# Patient Record
Sex: MALE | Race: WHITE | HISPANIC OR LATINO | Employment: OTHER | URBAN - METROPOLITAN AREA
[De-identification: names, ages, dates, MRNs, and addresses within clinical notes are randomized per-mention and may not be internally consistent; named-entity substitution may affect disease eponyms.]

---

## 2017-01-04 ENCOUNTER — OFFICE VISIT (OUTPATIENT)
Dept: BEHAVIORAL HEALTH | Facility: PHYSICIAN GROUP | Age: 42
End: 2017-01-04
Payer: OTHER GOVERNMENT

## 2017-01-04 DIAGNOSIS — F33.1 MAJOR DEPRESSIVE DISORDER, RECURRENT EPISODE, MODERATE (HCC): ICD-10-CM

## 2017-01-04 PROCEDURE — 99214 OFFICE O/P EST MOD 30 MIN: CPT | Performed by: NURSE PRACTITIONER

## 2017-01-04 RX ORDER — BUPROPION HYDROCHLORIDE 150 MG/1
150 TABLET ORAL EVERY MORNING
Qty: 90 TAB | Refills: 0 | Status: SHIPPED | OUTPATIENT
Start: 2017-01-04 | End: 2017-04-05 | Stop reason: SDUPTHER

## 2017-01-04 RX ORDER — SILDENAFIL CITRATE 100 MG
TABLET ORAL
Refills: 2 | COMMUNITY
Start: 2016-11-25 | End: 2018-06-07 | Stop reason: SDUPTHER

## 2017-01-04 RX ORDER — INFLUENZA VIRUS VACCINE 15; 15; 15; 15 UG/.5ML; UG/.5ML; UG/.5ML; UG/.5ML
SUSPENSION INTRAMUSCULAR
Refills: 0 | COMMUNITY
Start: 2016-10-11 | End: 2018-04-24

## 2017-01-04 RX ORDER — BUPROPION HYDROCHLORIDE 300 MG/1
300 TABLET ORAL EVERY MORNING
COMMUNITY
End: 2017-04-05 | Stop reason: SDUPTHER

## 2017-01-04 RX ORDER — ERGOCALCIFEROL 1.25 MG/1
50000 CAPSULE ORAL
Refills: 11 | COMMUNITY
Start: 2016-10-27 | End: 2019-03-18

## 2017-01-04 NOTE — PROGRESS NOTES
[facility]  PSYCHIATRIC FOLLOW-UP NOTE    Name: Mike Riso  MRN: [id number]  : 1975  Age: 41 y.o.  Date of assessment: 2017  PCP: Vinay Soares D.O.  Persons in attendance: Patient    REASON FOR VISIT/CHIEF COMPLAINT (as stated by Patient):  Mike Rios is a 41 y.o., Other  White male, attending follow-up appointment for depression and insomnia, medication recheck      HISTORY OF PRESENT ILLNESS:    Patient reports he has restarted taking Wellbutrin  mg daily, that his mom passed away in August and he was not taking medication and started struggling again with depression. Reports he was having problems sleeping, was irritable, was having problems with concentration and memory without medication. Verbalizes he is improving, is doing okay at work, has been taking meds every day. Has kept the prescription for Wellbutrin  mg in case he finds his mood going lower and he feels he needs to increase his dose. Patient has also noticed he was having nightmares while not taking his antidepressant.  Patient takes trazodone for sleep at night when needed. Patient denies suicidal thoughts, but reports he did have some suicidal thinking after his mom passed away in August.     PSYCHOSOCIAL CHANGES SINCE PREVIOUS CONTACT:  No drinking or drugs    MEDICATION SIDE EFFECTS:  none    REVIEW OF SYSTEMS:    General appearance: casually dressed male, dressed in uniform. Neat/clean, pleasant and cooperative    Constitutional negative -no fever/chills   Eyes not tested   Ears/Nose/Mouth/Throat not tested   Cardiovascular not tested   Respiratory positive - sleep apnea, using CPAP again   Gastrointestinal negative - appetite within normal limits   Genitourinary positive - taking Viagra   Muscular not tested   Integumentary not tested   Neurological negative   Endocrine not tested   Hematologic/Lymphatic not tested       PSYCHIATRIC EXAMINATION/MENTAL STATUS  There were no vitals taken for  this visit.  Participation: Active verbal participation, Attentive, Engaged and Open to feedback  Grooming:Casual and Neat  Orientation: Fully Oriented  Eye contact: Good  Behavior:Calm   Mood: Euthymic  Affect: Full range and Congruent with content  Thought process: Logical and Goal-directed  Thought content:  Within normal limits  Speech: Rate within normal limits and Volume within normal limits  Perception:  Within normal limits  Memory:  No gross evidence of memory deficits  Insight: Good  Judgment: Good  Family/couple interaction observations:   Other:    Current risk:    Suicide: Low   Homicide: Not applicable   Self-harm: Low  Relapse: Not applicable  Other:   Crisis Safety Plan reviewed?911/ER for suicidal thoughts  If evidence of imminent risk is present, intervention/plan:    Medical Records/Labs/Diagnostic Tests Reviewed: none    Medical Records/Labs/Diagnostic Tests Ordered: none    DIAGNOSTIC IMPRESSION(S):  1. Major depressive disorder, recurrent episode, moderate (HCC)           ASSESSMENT AND PLAN: recurrent episode of MDD, mood is improving; appears to have started with grief reaction to mom's death and being off of medication  Patient requests a letter about diagnosis and symptoms, need for medication; staff will inform him when it is ready for .   Discussed monitoring his mood, if he feels he is having symptoms of depression he needs to be taking the Wellbutrin  mg daily instead of the 150 mg daily dose he is currently taking. Discussed with patient need to stay on medication that is working, instead of going on and off of it as he has had repeat episodes of MDD.   Continue trazodone prn.      Current outpatient prescriptions:   •  vitamin D, Ergocalciferol, (DRISDOL) 48458 UNITS Cap capsule, Take 50,000 Units by mouth., Disp: , Rfl: 11  •  VIAGRA 100 MG tablet, TAKE 1 TABLET BY MOUTH 30MIN BEFORE ACTIVITY, Disp: , Rfl: 2  •  buPROPion (WELLBUTRIN XL) 300 MG XL tablet, Take 300 mg by  mouth every morning., Disp: , Rfl:   •  buPROPion (WELLBUTRIN XL) 150 MG XL tablet, Take 1 Tab by mouth every morning., Disp: 30 Tab, Rfl: 3  •  azelastine (ASTELIN) 137 MCG/SPRAY nasal spray, Spray 2 Sprays in nose 2 Times a Day., Disp: , Rfl: 4  •  DYMISTA 137-50 MCG/ACT Suspension, Spray 2 Sprays in nose every day., Disp: , Rfl: 5  •  omeprazole (PRILOSEC) 20 MG delayed-release capsule, Take 20 mg by mouth every day., Disp: , Rfl:   •  hydrocodone-acetaminophen (NORCO) 5-325 MG Tab per tablet, Take 1 Tab by mouth every 6 hours as needed., Disp: 20 Tab, Rfl: 0  •  diclofenac EC (VOLTAREN) 75 MG Tablet Delayed Response, Take 1 Tab by mouth 2 times a day., Disp: 60 Tab, Rfl: 0  •  carisoprodol (SOMA) 350 MG TABS, Take 1 Tab by mouth every 8 hours as needed for Muscle Spasms., Disp: 30 Tab, Rfl: 0  •  FLUARIX QUADRIVALENT 0.5 ML Suspension Prefilled Syringe injection, TO BE ADMINISTERED BY PHARMACIST FOR IMMUNIZATION, Disp: , Rfl: 0  •  ibuprofen (MOTRIN) 200 MG Tab, Take 200 mg by mouth every 6 hours as needed., Disp: , Rfl:   •  Diclofenac Epolamine (FLECTOR) 1.3 % PTCH, 0.5 Patches by Apply externally route every 12 hours as needed., Disp: 5 Each, Rfl: 3        More than 50% of face-to-face time in this 25 minute visit was spent in psychotherapy/counseling.    Topics addressed include:    CAROLE Catherine

## 2017-01-05 ENCOUNTER — OFFICE VISIT (OUTPATIENT)
Dept: BEHAVIORAL HEALTH | Facility: PHYSICIAN GROUP | Age: 42
End: 2017-01-05
Payer: OTHER GOVERNMENT

## 2017-01-05 DIAGNOSIS — F33.1 MAJOR DEPRESSIVE DISORDER, RECURRENT EPISODE, MODERATE (HCC): ICD-10-CM

## 2017-01-05 PROCEDURE — 90832 PSYTX W PT 30 MINUTES: CPT | Performed by: PSYCHOLOGIST

## 2017-01-05 NOTE — BH THERAPY
" [facility]  Therapy Progress Note    Patient Name: Mike Rios  Patient MRN: [id number]  Today's Date: 1/5/17     Type of session:Individual psychotherapy  Length of session: 45  Persons in attendance:Patient    Subjective/New Info: Pt reports he is back to taking meds and using c-pap machine regularly now and feels much better. He finishes his school in May and is now thinking of getting his PhD in . Pt less irritated by work, but still hates job and the politics that go on there. He struggles with his ability to detach from \"the circus\" and just focus on his own efforts. Some of this is because he takes slights from others too personally. Pt reports less distress from his mom's death - beginning to accept it. 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 Disc need to introduce some fun and relaxation into his life.     Objective/Observations:   Participation: Active verbal participation, Attentive, Engaged and Open to feedback   Grooming: Good   Cognition: Alert and Fully Oriented   Eye contact: Good   Mood: Euthymic   Affect: Congruent with content   Thought process: Logical and Goal-directed   Speech: Rate within normal limits   Other:     Diagnoses:   1. Major depressive disorder, recurrent episode, moderate (HCC)         Current risk:   SUICIDE: Low   Homicide: Not applicable   Self-harm: Not applicable   Relapse: Not applicable   Other:    Safety Plan reviewed? Not Indicated   If evidence of imminent risk is present, intervention/plan:     Therapeutic Intervention(s): Behavior:  Behavioral activation, Conflict clarification, Distress tolerance skills, Self-care skills, Stressors assessed and Supportive psychotherapy    Treatment Goal(s)/Objective(s) addressed: Tx plan:  - Utilize learned skills to manage mood and emotional suffering more effectively  - Identify goals/values and cultivate a " meaningful life  - Learn to be more emotionally flexible/resilient in times of stress/challenges  - Eliminate SI/self harming behaviors & increase sense of hope/optimisim        Progress toward Treatment Goals: Moderate improvement    Plan:  - Continue Individual therapy    CESAR AlcocerHJEFFREY.  1/5/17

## 2017-02-24 ENCOUNTER — TELEPHONE (OUTPATIENT)
Dept: BEHAVIORAL HEALTH | Facility: PHYSICIAN GROUP | Age: 42
End: 2017-02-24

## 2017-02-24 NOTE — TELEPHONE ENCOUNTER
Attempt to contact patient regarding letter for work, left message for patient to return phone call and verify what he is needing exactly in the letter.

## 2017-02-27 ENCOUNTER — TELEPHONE (OUTPATIENT)
Dept: BEHAVIORAL HEALTH | Facility: PHYSICIAN GROUP | Age: 42
End: 2017-02-27

## 2017-02-27 NOTE — TELEPHONE ENCOUNTER
Contact patient regarding letter he needs about his treatment/diagnoses. Patient states he needs a letter with length of time in provider care, diagnoses, medication treatment, as well as symptoms including suicidal thoughts, problems sleeping, cognitive symptoms including poor concentration and motivation. Staff will phone patient when letter ready for .

## 2017-03-02 ENCOUNTER — OFFICE VISIT (OUTPATIENT)
Dept: BEHAVIORAL HEALTH | Facility: PHYSICIAN GROUP | Age: 42
End: 2017-03-02
Payer: OTHER GOVERNMENT

## 2017-03-02 DIAGNOSIS — F33.1 MAJOR DEPRESSIVE DISORDER, RECURRENT EPISODE, MODERATE (HCC): ICD-10-CM

## 2017-03-02 PROCEDURE — 90834 PSYTX W PT 45 MINUTES: CPT | Performed by: PSYCHOLOGIST

## 2017-03-02 NOTE — BH THERAPY
[facility]  Therapy Progress Note    Patient Name: Mike Rios  Patient MRN: [id number]  Today's Date: 3/2/2017     Type of session:Individual psychotherapy  Length of session: 45  Persons in attendance:Patient    Subjective/New Info: pt reports he feels more like himself on the meds and isn't over reacting to everything at work. Pt processed his feelings about being transferred June of next year to finish out his last year and half. Pt was irritated that his request to extend was denied, even when he explained that his father in law, who is now living with him, is having serious heart problems and will need to have bypass surgery at [place] in a couple of months. Pt agrees that it is better for him to stay on meds while he is working in . Pt is back to caring for himself with c-pap and exercise.    Objective/Observations:   Participation: Active verbal participation, Attentive, Engaged and Open to feedback   Grooming: Good   Cognition: Alert and Fully Oriented   Eye contact: Good   Mood: Euthymic   Affect: Congruent with content   Thought process: Logical and Goal-directed   Speech: Rate within normal limits   Other:     Diagnoses:   1. Major depressive disorder, recurrent episode, moderate (CMS-HCC)         Current risk:   SUICIDE: Low   Homicide: Not applicable   Self-harm: Not applicable   Relapse: Not applicable   Other:    Safety Plan reviewed? Not Indicated   If evidence of imminent risk is present, intervention/plan:     Therapeutic Intervention(s): Behavior:  Behavioral activation, Conflict clarification, Distress tolerance skills, Self-care skills, Stressors assessed and Supportive psychotherapy    Treatment Goal(s)/Objective(s) addressed: Tx plan:  - Utilize learned skills to manage mood and emotional suffering more effectively  - Identify goals/values and cultivate a meaningful life  - Learn to be more emotionally flexible/resilient in times of stress/challenges  - Eliminate  SI/self harming behaviors & increase sense of hope/optimisim        Progress toward Treatment Goals: Moderate improvement    Plan:  - Continue Individual therapy and Medication management    CESAR AlcocerHANGY  3/2/2017

## 2017-03-08 ENCOUNTER — SLEEP CENTER VISIT (OUTPATIENT)
Dept: SLEEP MEDICINE | Facility: MEDICAL CENTER | Age: 42
End: 2017-03-08
Payer: OTHER GOVERNMENT

## 2017-03-08 VITALS
WEIGHT: 203 LBS | BODY MASS INDEX: 27.5 KG/M2 | SYSTOLIC BLOOD PRESSURE: 112 MMHG | HEIGHT: 72 IN | DIASTOLIC BLOOD PRESSURE: 68 MMHG | HEART RATE: 75 BPM | RESPIRATION RATE: 12 BRPM

## 2017-03-08 DIAGNOSIS — G47.33 OBSTRUCTIVE SLEEP APNEA: ICD-10-CM

## 2017-03-08 DIAGNOSIS — K21.9 GASTROESOPHAGEAL REFLUX DISEASE, ESOPHAGITIS PRESENCE NOT SPECIFIED: ICD-10-CM

## 2017-03-08 PROCEDURE — 99213 OFFICE O/P EST LOW 20 MIN: CPT | Performed by: NURSE PRACTITIONER

## 2017-03-08 NOTE — MR AVS SNAPSHOT
"        Mike Rios   3/8/2017 8:40 AM   Sleep Center Visit   MRN: [id number]    Department:  [facility]   Dept Phone:  [phone]    Description:  Male : 1975   Provider:  CAROLE Orellana           Reason for Visit     Follow-Up           Allergies as of 3/8/2017     Allergen Noted Reactions    Pcn [Penicillins] 2009         You were diagnosed with     Obstructive sleep apnea   [592196]       Gastroesophageal reflux disease, esophagitis presence not specified   [5448802]         Vital Signs     Blood Pressure Pulse Respirations Height Weight Body Mass Index    112/68 mmHg 75 12 1.829 m (6' 0.01\") 92.08 kg (203 lb) 27.53 kg/m2    Smoking Status                   Never Smoker            Basic Information     Date Of Birth Sex Race Ethnicity Preferred Language    1975 Male Other, White  Origin (Azeri,Indonesian,Portuguese,He, etc) English      Your appointments     Mar 08, 2017  8:40 AM   Follow UP with CAROLE Orellana   [facility] (--)    [address] [place] [id number]   [phone]            2017  9:30 AM   Follow Up Med Management with CAROLE Catherine   [facility] [address] [place] [address])    [address] [place] [address]   [phone]            May 11, 2017  9:00 AM   Individual Therapy with June Canela P.H.D.   [facility] [address] [place] [address])    [address] [place], [place] [address] [place] [address]   [phone]            2017  9:00 AM   Individual Therapy with June Canela P.H.D.   [facility] [address] [place] [address])    [address] [place], [place] [address] [place] [address]   [phone]            2017  9:00 AM   Follow Up Med Management with CAROLE Catherine   [facility] [address] [place] [address])    [address] [place], [place] [address] [place] [address]   [phone]            Mar 19, 2018  8:40 AM   Follow UP with CAROLE Orellana "   [facility] Sleep [facility] (--)    [address]  [place] [id number] [phone]              Problem List              ICD-10-CM Priority Class Noted - Resolved    Major depressive disorder, recurrent episode, moderate (CMS-HCC) F33.1   11/10/2016 - Present    Grief reaction F43.20   11/10/2016 - Present    Sleep apnea G47.30   11/10/2016 - Present    Obstructive sleep apnea G47.33   3/8/2017 - Present    Gastroesophageal reflux disease K21.9   3/8/2017 - Present      Health Maintenance        Date Due Completion Dates    IMM DTaP/Tdap/Td Vaccine (1 - Tdap) 3/13/1993 ---    IMM INFLUENZA (1) 9/1/2016 ---            Current Immunizations     No immunizations on file.      Below and/or attached are the medications your provider expects you to take. Review all of your home medications and newly ordered medications with your provider and/or pharmacist. Follow medication instructions as directed by your provider and/or pharmacist. Please keep your medication list with you and share with your provider. Update the information when medications are discontinued, doses are changed, or new medications (including over-the-counter products) are added; and carry medication information at all times in the event of emergency situations     Allergies:  PCN - (reactions not documented)               Medications  Valid as of: March 08, 2017 -  8:22 AM    Generic Name Brand Name Tablet Size Instructions for use    Azelastine HCl (Solution) ASTELIN 137 MCG/SPRAY Spray 2 Sprays in nose 2 Times a Day.        Azelastine-Fluticasone (Suspension) DYMISTA 137-50 MCG/ACT Spray 2 Sprays in nose every day.        BuPROPion HCl (TABLET SR 24 HR) WELLBUTRIN  MG Take 300 mg by mouth every morning.        BuPROPion HCl (TABLET SR 24 HR) WELLBUTRIN  MG Take 1 Tab by mouth every morning.        Carisoprodol (Tab) SOMA 350 MG Take 1 Tab by mouth every 8 hours as needed for Muscle Spasms.        Diclofenac  Epolamine (Patch) FLECTOR 1.3 % 0.5 Patches by Apply externally route every 12 hours as needed.        Diclofenac Sodium (Tablet Delayed Response) VOLTAREN 75 MG Take 1 Tab by mouth 2 times a day.        Ergocalciferol (Cap) DRISDOL 98434 UNITS Take 50,000 Units by mouth.        Hydrocodone-Acetaminophen (Tab) NORCO 5-325 MG Take 1 Tab by mouth every 6 hours as needed.        Ibuprofen (Tab) MOTRIN 200 MG Take 200 mg by mouth every 6 hours as needed.        Influenza Vac Split Quad (Suspension Prefilled Syringe) FLUARIX QUADRIVALENT 0.5 ML TO BE ADMINISTERED BY PHARMACIST FOR IMMUNIZATION        Omeprazole (CAPSULE DELAYED RELEASE) PRILOSEC 20 MG Take 20 mg by mouth every day.        Sildenafil Citrate (Tab) VIAGRA 100 MG TAKE 1 TABLET BY MOUTH 30MIN BEFORE ACTIVITY        .                 Medicines prescribed today were sent to:     [id number] - [place] - [address]    [address] [place] [address]    Phone: [phone] Fax: [phone]    Open 24 Hours?: No    [facility] [address] [place] [address] [place] - [address] AT [facility] [employer] [facility]    [address] [place] [address]    Phone: [phone] Fax: [phone]    Open 24 Hours?: No      Medication refill instructions:       If your prescription bottle indicates you have medication refills left, it is not necessary to call your provider’s office. Please contact your pharmacy and they will refill your medication.    If your prescription bottle indicates you do not have any refills left, you may request refills at any time through one of the following ways: The online [employer] system (except Urgent Care), by calling your provider’s office, or by asking your pharmacy to contact your provider’s office with a refill request. Medication refills are processed only during regular business hours and may not be available until the next business day. Your provider may request additional information or to have a follow-up visit with you prior to  refilling your medication.   *Please Note: Medication refills are assigned a new Rx number when refilled electronically. Your pharmacy may indicate that no refills were authorized even though a new prescription for the same medication is available at the pharmacy. Please request the medicine by name with the pharmacy before contacting your provider for a refill.        Instructions    1. Patient will fax form to be filled out.  2. Follow up in 1 year with compliance card, sooner if needed.          [employer] Access Code: Activation code not generated  Current Keelr Status: Active

## 2017-03-08 NOTE — PROGRESS NOTES
Chief Complaint   Patient presents with   • Follow-Up       HPI:  Mike Rios is a 41 y.o. year old male here today for annual follow-up on FLIP f/u. Original sleep symptoms consisted of loud snoring, witnessed apneas,  Frequent nocturnal awakenings, non-restful sleep, and morning headaches.  Titration study indicated AHI of 19.9 with an air 84%.  He did have several central apneas.  His father did die of a heart attack and was also untreated for sleep apnea. He has successfully acclimated with nasal mask to CPAP 10 cm H2O.  Compliance card 12/8/16-3/7/17 indicates 73% compliance, avg nightly use of 4.5hrs, AHi 0.4 and minimal mask leak. He has had less consistent use due to depression from his mother passing away. He understands the need to use nightly. He does note a difference in how he feels when he does not use machine.  CNOX 3/15/2016 indicates basal SPO2 of 93.4% and below 90% for 6.7% of testing but there is some artifact but overall patient was adequate oximetry.  He states he feels much better since starting therapy with improved energy levels, no daytime sleepiness, no headaches, and easily to wake in the morning.  He denies nasal congestion or dry mouth. He denies any changes in health since last OV. He denies fever, chills, night sweats, chest pain, dyspnea, cough, wheezing, ankle swelling or hemoptysis. GERD is controlled with Prilosec QD.    ROS: As per HPI and otherwise negative if not stated.    Past Medical History   Diagnosis Date   • Back pain    • FLIP (obstructive sleep apnea)    • Central sleep apnea    • Depression        Past Surgical History   Procedure Laterality Date   • Hernia repair         Family History   Problem Relation Age of Onset   • Heart Attack     • Hypertension Father    • Heart Disease Father    • Hyperlipidemia Father    • Psychiatry Neg Hx        Social History     Social History   • Marital Status:      Spouse Name: N/A   • Number of Children: N/A   • Years  "of Education: N/A     Occupational History   • Not on file.     Social History Main Topics   • Smoking status: Never Smoker    • Smokeless tobacco: Not on file   • Alcohol Use: 0.0 oz/week     0 Standard drinks or equivalent per week      Comment: RARE   • Drug Use: No      Comment: denies   • Sexual Activity:     Partners: Female     Other Topics Concern   • Not on file     Social History Narrative       Allergies as of 03/08/2017 - Primo as Reviewed 03/08/2017   Allergen Reaction Noted   • Pcn [penicillins]  08/04/2009        @Vital signs for this encounter:  Filed Vitals:    03/08/17 0803   Height: 1.829 m (6' 0.01\")   Weight: 92.08 kg (203 lb)   Weight % change since last entry.: 0 %   BP: 112/68   Pulse: 75   BMI (Calculated): 27.53   Resp: 12   O2 sat % room air: 98 %       Current medications as of today   Current Outpatient Prescriptions   Medication Sig Dispense Refill   • vitamin D, Ergocalciferol, (DRISDOL) 47123 UNITS Cap capsule Take 50,000 Units by mouth.  11   • FLUARIX QUADRIVALENT 0.5 ML Suspension Prefilled Syringe injection TO BE ADMINISTERED BY PHARMACIST FOR IMMUNIZATION  0   • VIAGRA 100 MG tablet TAKE 1 TABLET BY MOUTH 30MIN BEFORE ACTIVITY  2   • buPROPion (WELLBUTRIN XL) 300 MG XL tablet Take 300 mg by mouth every morning.     • buPROPion (WELLBUTRIN XL) 150 MG XL tablet Take 1 Tab by mouth every morning. 90 Tab 0   • azelastine (ASTELIN) 137 MCG/SPRAY nasal spray [place] 2 Sprays in nose 2 Times a Day.  4   • DYMISTA 137-50 MCG/ACT Suspension Spray 2 Sprays in nose every day.  5   • ibuprofen (MOTRIN) 200 MG Tab Take 200 mg by mouth every 6 hours as needed.     • omeprazole (PRILOSEC) 20 MG delayed-release capsule Take 20 mg by mouth every day.     • hydrocodone-acetaminophen (NORCO) 5-325 MG Tab per tablet Take 1 Tab by mouth every 6 hours as needed. 20 Tab 0   • diclofenac EC (VOLTAREN) 75 MG Tablet Delayed Response Take 1 Tab by mouth 2 times a day. 60 Tab 0   • carisoprodol (SOMA) 350 MG " TABS Take 1 Tab by mouth every 8 hours as needed for Muscle Spasms. 30 Tab 0   • Diclofenac Epolamine (FLECTOR) 1.3 % PTCH 0.5 Patches by Apply externally route every 12 hours as needed. 5 Each 3     No current facility-administered medications for this visit.         Physical Exam:   Gen:           Alert and oriented, No apparent distress. Mood and affect appropriate, normal interaction with examiner.  Eyes:          PERRL, EOM intact, sclere white, conjunctive moist.  Ears:          Not examined.  Hearing:     Grossly intact.  Nose:          Normal, no lesions or deformities.  Dentition:    Good dentition.  Oropharynx:   Tongue normal, posterior pharynx without erythema or exudate.  Mallampati Classification: 3  Neck:        Supple, trachea midline, no masses.  Respiratory Effort: No intercostal retractions or use of accessory muscles.   Lung Auscultation:      Clear to auscultation bilaterally; no rales, rhonchi or wheezing.  CV:            Regular rate and rhythm. No murmurs, rubs or gallops.  Abd:           Not examined.   Lymphadenopathy: Not examined.  Gait and Station: Normal.  Digits and Nails: No clubbing, cyanosis, petechiae, or nodes.   Cranial Nerves: II-XII grossly intact.  Skin:        No rashes, lesions or ulcers noted.               Ext:           No cyanosis or edema.      Assessment:  1. Obstructive sleep apnea     2. Gastroesophageal reflux disease, esophagitis presence not specified         Immunizations:    Flu:2016  Pneumovax 23:not given  Prevnar 13:not given    Plan:  1. Continue CPAP 10cm H20 nightly.  2. DME order mask/supplies.  3. Discussed sleep hygiene.  4. Follow up in 1 year with compliance card, sooner if needed. Patient will fax form to sleep center to be completed for work.

## 2017-03-08 NOTE — PATIENT INSTRUCTIONS
1. Patient will fax form to be filled out.  2. Follow up in 1 year with compliance card, sooner if needed.

## 2017-03-22 ENCOUNTER — OCCUPATIONAL MEDICINE (OUTPATIENT)
Dept: URGENT CARE | Facility: PHYSICIAN GROUP | Age: 42
End: 2017-03-22
Payer: OTHER GOVERNMENT

## 2017-03-22 VITALS
TEMPERATURE: 97.7 F | HEART RATE: 64 BPM | HEIGHT: 72 IN | OXYGEN SATURATION: 96 % | DIASTOLIC BLOOD PRESSURE: 74 MMHG | SYSTOLIC BLOOD PRESSURE: 110 MMHG | RESPIRATION RATE: 16 BRPM | BODY MASS INDEX: 27.09 KG/M2 | WEIGHT: 200 LBS

## 2017-03-22 DIAGNOSIS — M62.838 TRAPEZIUS MUSCLE SPASM: ICD-10-CM

## 2017-03-22 PROCEDURE — 99214 OFFICE O/P EST MOD 30 MIN: CPT | Performed by: PHYSICIAN ASSISTANT

## 2017-03-22 RX ORDER — SUMATRIPTAN 50 MG/1
TABLET, FILM COATED ORAL
COMMUNITY
Start: 2017-03-19 | End: 2018-04-24

## 2017-03-22 RX ORDER — CYCLOBENZAPRINE HCL 10 MG
10 TABLET ORAL 3 TIMES DAILY PRN
Qty: 30 TAB | Refills: 0 | Status: SHIPPED | OUTPATIENT
Start: 2017-03-22 | End: 2019-03-22

## 2017-03-22 RX ORDER — CYCLOBENZAPRINE HCL 10 MG
10 TABLET ORAL 3 TIMES DAILY PRN
Qty: 30 TAB | Refills: 0 | Status: SHIPPED | OUTPATIENT
Start: 2017-03-22 | End: 2017-03-22 | Stop reason: SDUPTHER

## 2017-03-22 ASSESSMENT — ENCOUNTER SYMPTOMS
FALLS: 0
NAUSEA: 0
WHEEZING: 0
BACK PAIN: 0
TINGLING: 1
PALPITATIONS: 0
COUGH: 0
NECK PAIN: 1
DIARRHEA: 0
SHORTNESS OF BREATH: 0
CHILLS: 0
ABDOMINAL PAIN: 0
FEVER: 0
VOMITING: 0
SENSORY CHANGE: 0
MUSCLE SPASMS: 1

## 2017-03-22 ASSESSMENT — PAIN SCALES - GENERAL: PAINLEVEL: 5=MODERATE PAIN
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
## 2017-03-22 NOTE — MR AVS SNAPSHOT
Mike Rios   3/22/2017 2:20 PM   Occupational Medicine   MRN: [id number]    Department:  [facility]   Dept Phone:  [phone]    Description:  Male : 1975   Provider:  Oscar Moon PA-C           Reason for Visit     Shoulder Injury WC IV 17 Lt Shoulder/Neck Injury. Pt states he has a pinching sensation in Lt shoulder/neck after doing pushups for a PFT. Pain when looking Lt/up/down.      Allergies as of 3/22/2017     Allergen Noted Reactions    Pcn [Penicillins] 2009         You were diagnosed with     Trapezius muscle spasm   [[id number]]         Vital Signs     Blood Pressure Pulse Temperature Respirations Height Weight    110/74 mmHg 64 36.5 °C (97.7 °F) 16 1.829 m (6') 90.719 kg (200 lb)    Body Mass Index Oxygen Saturation Smoking Status             27.12 kg/m2 96% Never Smoker          Basic Information     Date Of Birth Sex Race Ethnicity Preferred Language    1975 Male Other, White  Origin ([place] citizen,[place], etc) English      Your appointments     2017  9:30 AM   Follow Up Med Management with CAROLE Catherine   [facility] [address] [place] [address])    [address] [place] [address]   [phone]            May 11, 2017  9:00 AM   Individual Therapy with June Canela P.H.D.   [facility] [address] [place] [address])    [address] [place] [address]   [phone]            2017  9:00 AM   Individual Therapy with June Canela P.H.D.   [facility] [address] [place] [address])    [address] [place] [address]   [phone]            2017  9:00 AM   Follow Up Med Management with CAROLE Catherine   [facility] [address] [place] [address])    [address] [place] [address]   [phone]            Mar 19, 2018  8:40 AM   Follow UP with CAROLE Orellana   [facility] (--)    [address] [place] [address]  [place] [address]   [phone]              Problem List              ICD-10-CM Priority Class Noted - Resolved    Major depressive disorder, recurrent episode, moderate (CMS-HCC) F33.1   11/10/2016 - Present    Grief reaction F43.20   11/10/2016 - Present    Sleep apnea G47.30   11/10/2016 - Present    Obstructive sleep apnea G47.33   3/8/2017 - Present    Gastroesophageal reflux disease K21.9   3/8/2017 - Present      Health Maintenance        Date Due Completion Dates    IMM DTaP/Tdap/Td Vaccine (1 - Tdap) 3/13/1993 ---    IMM INFLUENZA (1) 9/1/2016 ---            Current Immunizations     No immunizations on file.      Below and/or attached are the medications your provider expects you to take. Review all of your home medications and newly ordered medications with your provider and/or pharmacist. Follow medication instructions as directed by your provider and/or pharmacist. Please keep your medication list with you and share with your provider. Update the information when medications are discontinued, doses are changed, or new medications (including over-the-counter products) are added; and carry medication information at all times in the event of emergency situations     Allergies:  PCN - (reactions not documented)               Medications  Valid as of: March 22, 2017 -  3:14 PM    Generic Name Brand Name Tablet Size Instructions for use    Azelastine HCl (Solution) ASTELIN 137 MCG/SPRAY Spray 2 Sprays in nose 2 Times a Day.        Azelastine-Fluticasone (Suspension) DYMISTA 137-50 MCG/ACT Spray 2 Sprays in nose every day.        BuPROPion HCl (TABLET SR 24 HR) WELLBUTRIN  MG Take 300 mg by mouth every morning.        BuPROPion HCl (TABLET SR 24 HR) WELLBUTRIN  MG Take 1 Tab by mouth every morning.        Carisoprodol (Tab) SOMA 350 MG Take 1 Tab by mouth every 8 hours as needed for Muscle Spasms.        Cyclobenzaprine HCl (Tab) FLEXERIL 10 MG Take 1 Tab by mouth 3 times a day as needed.         Diclofenac Epolamine (Patch) FLECTOR 1.3 % 0.5 Patches by Apply externally route every 12 hours as needed.        Diclofenac Sodium (Tablet Delayed Response) VOLTAREN 75 MG Take 1 Tab by mouth 2 times a day.        Ergocalciferol (Cap) DRISDOL 83150 UNITS Take 50,000 Units by mouth.        Hydrocodone-Acetaminophen (Tab) NORCO 5-325 MG Take 1 Tab by mouth every 6 hours as needed.        Ibuprofen (Tab) MOTRIN 200 MG Take 200 mg by mouth every 6 hours as needed.        Influenza Vac Split Quad (Suspension Prefilled Syringe) FLUARIX QUADRIVALENT 0.5 ML TO BE ADMINISTERED BY PHARMACIST FOR IMMUNIZATION        Omeprazole (CAPSULE DELAYED RELEASE) PRILOSEC 20 MG Take 20 mg by mouth every day.        Sildenafil Citrate (Tab) VIAGRA 100 MG TAKE 1 TABLET BY MOUTH 30MIN BEFORE ACTIVITY        SUMAtriptan Succinate (Tab) IMITREX 50 MG         .                 Medicines prescribed today were sent to:     [id number] - [place] - [address]    [address] [place] [address]    Phone: [phone] Fax: [phone]    Open 24 Hours?: No    [id number]  [place] - [address] AT [facility] [address]    [address] [place] [address]    Phone: [phone] Fax: [phone]    Open 24 Hours?: No      Medication refill instructions:       If your prescription bottle indicates you have medication refills left, it is not necessary to call your provider’s office. Please contact your pharmacy and they will refill your medication.    If your prescription bottle indicates you do not have any refills left, you may request refills at any time through one of the following ways: The online [employer] system (except Urgent Care), by calling your provider’s office, or by asking your pharmacy to contact your provider’s office with a refill request. Medication refills are processed only during regular business hours and may not be available until the next business day. Your provider may request additional  information or to have a follow-up visit with you prior to refilling your medication.   *Please Note: Medication refills are assigned a new Rx number when refilled electronically. Your pharmacy may indicate that no refills were authorized even though a new prescription for the same medication is available at the pharmacy. Please request the medicine by name with the pharmacy before contacting your provider for a refill.        Referral     A referral request has been sent to our patient care coordination department. Please allow 3-5 business days for us to process this request and contact you either by phone or mail. If you do not hear from us by the 5th business day, please call us at [phone].           Aptible Access Code: Activation code not generated  Current Aptible Status: Active

## 2017-04-05 ENCOUNTER — OFFICE VISIT (OUTPATIENT)
Dept: BEHAVIORAL HEALTH | Facility: PHYSICIAN GROUP | Age: 42
End: 2017-04-05
Payer: OTHER GOVERNMENT

## 2017-04-05 VITALS
DIASTOLIC BLOOD PRESSURE: 84 MMHG | HEIGHT: 72 IN | HEART RATE: 70 BPM | WEIGHT: 205 LBS | SYSTOLIC BLOOD PRESSURE: 126 MMHG | BODY MASS INDEX: 27.77 KG/M2

## 2017-04-05 DIAGNOSIS — F33.1 MAJOR DEPRESSIVE DISORDER, RECURRENT EPISODE, MODERATE (HCC): ICD-10-CM

## 2017-04-05 PROCEDURE — 99213 OFFICE O/P EST LOW 20 MIN: CPT | Performed by: NURSE PRACTITIONER

## 2017-04-05 RX ORDER — BUPROPION HYDROCHLORIDE 300 MG/1
300 TABLET ORAL EVERY MORNING
Qty: 90 TAB | Refills: 0 | Status: SHIPPED | OUTPATIENT
Start: 2017-04-05 | End: 2017-07-20 | Stop reason: SDUPTHER

## 2017-04-05 RX ORDER — BUPROPION HYDROCHLORIDE 150 MG/1
150 TABLET ORAL EVERY MORNING
Qty: 90 TAB | Refills: 0 | Status: SHIPPED | OUTPATIENT
Start: 2017-04-05 | End: 2017-07-18

## 2017-04-05 RX ORDER — TRAZODONE HYDROCHLORIDE 50 MG/1
25-50 TABLET ORAL NIGHTLY PRN
Qty: 90 TAB | Refills: 0 | Status: SHIPPED | OUTPATIENT
Start: 2017-04-05 | End: 2017-07-13 | Stop reason: SDUPTHER

## 2017-05-11 ENCOUNTER — OFFICE VISIT (OUTPATIENT)
Dept: BEHAVIORAL HEALTH | Facility: PHYSICIAN GROUP | Age: 42
End: 2017-05-11
Payer: OTHER GOVERNMENT

## 2017-05-11 DIAGNOSIS — F33.1 MAJOR DEPRESSIVE DISORDER, RECURRENT EPISODE, MODERATE (HCC): ICD-10-CM

## 2017-05-11 PROCEDURE — 90834 PSYTX W PT 45 MINUTES: CPT | Performed by: PSYCHOLOGIST

## 2017-05-11 NOTE — BH THERAPY
[facility]  Therapy Progress Note    Patient Name: Mike Rios  Patient MRN: [id number]  Today's Date: 5/11/2017     Type of session:Individual psychotherapy  Length of session: 45  Persons in attendance:Patient    Subjective/New Info: Pt reports he feels like his mood is more stable on meds. Still feels very stressed by nature of his job. Processed his feelings about learning that a co-worker friend of his had a heart attack due to work stress. Worried it could happen to him - will get checked out by his DRStephanie Peralta the complications from Fa in law's heart surgery - now home from rehab but had stroke after surgery. Pt hoping he will be able to transfer to [facility] which would allow his family to stay in home here and he would come home on weekends.     Objective/Observations:   Participation: Active verbal participation, Attentive, Engaged and Open to feedback   Grooming: Good   Cognition: Alert and Fully Oriented   Eye contact: Good   Mood: Anxious   Affect: Congruent with content   Thought process: Logical and Goal-directed   Speech: Rate within normal limits   Other:     Diagnoses:   1. Major depressive disorder, recurrent episode, moderate (CMS-HCC)         Current risk:   SUICIDE: Low   Homicide: Not applicable   Self-harm: Not applicable   Relapse: Not applicable   Other:    Safety Plan reviewed? Not Indicated   If evidence of imminent risk is present, intervention/plan:     Therapeutic Intervention(s): Behavior:  Behavioral activation, Conflict clarification, Distress tolerance skills, Self-care skills, Stressors assessed and Supportive psychotherapy    Treatment Goal(s)/Objective(s) addressed: Tx plan:  - Utilize learned skills to manage mood and emotional suffering more effectively  - Identify goals/values and cultivate a meaningful life  - Learn to be more emotionally flexible/resilient in times of stress/challenges  - Eliminate SI/self harming behaviors & increase sense of hope/optimisim         Progress toward Treatment Goals: Moderate improvement    Plan:  - Continue Individual therapy and Medication management    June Canela P.H.D.  5/11/2017

## 2017-05-17 ENCOUNTER — HOSPITAL ENCOUNTER (OUTPATIENT)
Dept: PHYSICAL THERAPY | Facility: REHABILITATION | Age: 42
End: 2017-05-17
Attending: PHYSICIAN ASSISTANT
Payer: OTHER GOVERNMENT

## 2017-05-17 PROCEDURE — 97014 ELECTRIC STIMULATION THERAPY: CPT

## 2017-05-17 PROCEDURE — 97140 MANUAL THERAPY 1/> REGIONS: CPT

## 2017-05-17 PROCEDURE — 97161 PT EVAL LOW COMPLEX 20 MIN: CPT

## 2017-05-22 ENCOUNTER — HOSPITAL ENCOUNTER (OUTPATIENT)
Dept: PHYSICAL THERAPY | Facility: REHABILITATION | Age: 42
End: 2017-05-22
Attending: PHYSICIAN ASSISTANT
Payer: OTHER GOVERNMENT

## 2017-05-22 PROCEDURE — 97012 MECHANICAL TRACTION THERAPY: CPT

## 2017-05-22 PROCEDURE — 97110 THERAPEUTIC EXERCISES: CPT

## 2017-05-22 PROCEDURE — 97140 MANUAL THERAPY 1/> REGIONS: CPT | Mod: XU

## 2017-05-25 ENCOUNTER — HOSPITAL ENCOUNTER (OUTPATIENT)
Dept: PHYSICAL THERAPY | Facility: REHABILITATION | Age: 42
End: 2017-05-25
Attending: PHYSICIAN ASSISTANT
Payer: OTHER GOVERNMENT

## 2017-05-25 PROCEDURE — 97014 ELECTRIC STIMULATION THERAPY: CPT

## 2017-05-25 PROCEDURE — 97140 MANUAL THERAPY 1/> REGIONS: CPT

## 2017-05-31 ENCOUNTER — HOSPITAL ENCOUNTER (OUTPATIENT)
Dept: PHYSICAL THERAPY | Facility: REHABILITATION | Age: 42
End: 2017-05-31
Attending: PHYSICIAN ASSISTANT
Payer: OTHER GOVERNMENT

## 2017-05-31 PROCEDURE — 97014 ELECTRIC STIMULATION THERAPY: CPT

## 2017-05-31 PROCEDURE — 97140 MANUAL THERAPY 1/> REGIONS: CPT

## 2017-06-05 ENCOUNTER — HOSPITAL ENCOUNTER (OUTPATIENT)
Dept: PHYSICAL THERAPY | Facility: REHABILITATION | Age: 42
End: 2017-06-05
Attending: PHYSICIAN ASSISTANT
Payer: OTHER GOVERNMENT

## 2017-06-05 PROCEDURE — 97014 ELECTRIC STIMULATION THERAPY: CPT

## 2017-06-05 PROCEDURE — 97110 THERAPEUTIC EXERCISES: CPT

## 2017-06-05 PROCEDURE — 97140 MANUAL THERAPY 1/> REGIONS: CPT

## 2017-06-07 ENCOUNTER — NON-PROVIDER VISIT (OUTPATIENT)
Dept: CARDIOLOGY | Facility: MEDICAL CENTER | Age: 42
End: 2017-06-07
Attending: FAMILY MEDICINE
Payer: OTHER GOVERNMENT

## 2017-06-07 DIAGNOSIS — R07.9 CHEST PAIN, UNSPECIFIED TYPE: ICD-10-CM

## 2017-06-07 DIAGNOSIS — R07.89 XIPHOID SYNDROME: ICD-10-CM

## 2017-06-07 LAB — TREADMILL STRESS: NORMAL

## 2017-06-07 PROCEDURE — 93015 CV STRESS TEST SUPVJ I&R: CPT | Performed by: INTERNAL MEDICINE

## 2017-06-07 NOTE — PROGRESS NOTES
Quick Note:    Dear Ariella,    Can you please let Mike Rios know that result is ok and I will see patient as scheduled?    Thanks Gamaliel Krishnan.    ______

## 2017-06-09 ENCOUNTER — TELEPHONE (OUTPATIENT)
Dept: CARDIOLOGY | Facility: MEDICAL CENTER | Age: 42
End: 2017-06-09

## 2017-06-09 NOTE — TELEPHONE ENCOUNTER
Received: 2 days ago       ROCIO Noriega R.N.                   Dear Ariella,     Can you please let Mike Norma Blanca know that result is ok and I will see patient as scheduled?     Thanks Gamaliel Krishnan.       Pt called, given results per MD note. Pt understands. Pt encouraged to FU with PCP.

## 2017-06-12 ENCOUNTER — HOSPITAL ENCOUNTER (OUTPATIENT)
Dept: PHYSICAL THERAPY | Facility: REHABILITATION | Age: 42
End: 2017-06-12
Attending: PHYSICIAN ASSISTANT
Payer: OTHER GOVERNMENT

## 2017-06-12 PROCEDURE — 97140 MANUAL THERAPY 1/> REGIONS: CPT

## 2017-06-12 PROCEDURE — 97110 THERAPEUTIC EXERCISES: CPT

## 2017-06-14 ENCOUNTER — HOSPITAL ENCOUNTER (OUTPATIENT)
Dept: PHYSICAL THERAPY | Facility: REHABILITATION | Age: 42
End: 2017-06-14
Attending: PHYSICIAN ASSISTANT
Payer: OTHER GOVERNMENT

## 2017-06-14 PROCEDURE — 97014 ELECTRIC STIMULATION THERAPY: CPT

## 2017-06-14 PROCEDURE — 97140 MANUAL THERAPY 1/> REGIONS: CPT

## 2017-06-19 ENCOUNTER — HOSPITAL ENCOUNTER (OUTPATIENT)
Dept: PHYSICAL THERAPY | Facility: REHABILITATION | Age: 42
End: 2017-06-19
Attending: PHYSICIAN ASSISTANT
Payer: OTHER GOVERNMENT

## 2017-06-19 PROCEDURE — 97140 MANUAL THERAPY 1/> REGIONS: CPT

## 2017-06-19 PROCEDURE — 97110 THERAPEUTIC EXERCISES: CPT

## 2017-06-23 ENCOUNTER — HOSPITAL ENCOUNTER (OUTPATIENT)
Dept: PHYSICAL THERAPY | Facility: REHABILITATION | Age: 42
End: 2017-06-23
Attending: PHYSICIAN ASSISTANT
Payer: OTHER GOVERNMENT

## 2017-06-23 PROCEDURE — 97014 ELECTRIC STIMULATION THERAPY: CPT

## 2017-06-23 PROCEDURE — 97140 MANUAL THERAPY 1/> REGIONS: CPT

## 2017-06-26 ENCOUNTER — TELEPHONE (OUTPATIENT)
Dept: PULMONOLOGY | Facility: HOSPICE | Age: 42
End: 2017-06-26

## 2017-06-26 ENCOUNTER — TELEPHONE (OUTPATIENT)
Dept: SLEEP MEDICINE | Facility: MEDICAL CENTER | Age: 42
End: 2017-06-26

## 2017-06-26 NOTE — TELEPHONE ENCOUNTER
Letter faxed by Debra TIM  Patient notified and he confirmed receipt of the letter.  If any questions, please review phone message from AS on 06/26/2017.

## 2017-06-26 NOTE — TELEPHONE ENCOUNTER
Patient stopped by the sleep center again today.  There is a discrepancy between the [facility] paperwork and office visit from 03/08/2017 and the letter written 10/25/2016.  The paperwork and f/v from March 2017 state patient has FLIP, but the letter from October 2016 states patient has CSA.    MAJO Martinez:  Can you repeat the same letter, but correct the diagnosis to reflect the FLIP?  If the patient does, ultimately, have CSA, then the [facility] paperwork, sleep testing, and f/v will need to be corrected.    Once completed, please fax letter to [phone] and contact patient to advise this has been resolved.

## 2017-07-12 ENCOUNTER — OFFICE VISIT (OUTPATIENT)
Dept: BEHAVIORAL HEALTH | Facility: PHYSICIAN GROUP | Age: 42
End: 2017-07-12
Payer: OTHER GOVERNMENT

## 2017-07-12 DIAGNOSIS — F43.22 ADJUSTMENT DISORDER WITH ANXIETY: ICD-10-CM

## 2017-07-12 DIAGNOSIS — F33.1 MAJOR DEPRESSIVE DISORDER, RECURRENT EPISODE, MODERATE (HCC): ICD-10-CM

## 2017-07-12 PROCEDURE — 99214 OFFICE O/P EST MOD 30 MIN: CPT | Performed by: NURSE PRACTITIONER

## 2017-07-12 RX ORDER — FINASTERIDE 5 MG/1
TABLET, FILM COATED ORAL
Refills: 0 | COMMUNITY
Start: 2017-06-07 | End: 2018-06-21 | Stop reason: SDUPTHER
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
## 2017-07-13 ENCOUNTER — APPOINTMENT (OUTPATIENT)
Dept: BEHAVIORAL HEALTH | Facility: PHYSICIAN GROUP | Age: 42
End: 2017-07-13
Payer: OTHER GOVERNMENT

## 2017-07-13 RX ORDER — TRAZODONE HYDROCHLORIDE 50 MG/1
TABLET ORAL
Qty: 90 TAB | Refills: 0 | Status: SHIPPED | OUTPATIENT
Start: 2017-07-13 | End: 2017-08-23 | Stop reason: SDUPTHER

## 2017-07-18 ENCOUNTER — OFFICE VISIT (OUTPATIENT)
Dept: BEHAVIORAL HEALTH | Facility: PHYSICIAN GROUP | Age: 42
End: 2017-07-18
Payer: OTHER GOVERNMENT

## 2017-07-18 DIAGNOSIS — F33.1 MAJOR DEPRESSIVE DISORDER, RECURRENT EPISODE, MODERATE (HCC): ICD-10-CM

## 2017-07-18 DIAGNOSIS — F43.22 ADJUSTMENT DISORDER WITH ANXIETY: ICD-10-CM

## 2017-07-18 PROCEDURE — 99214 OFFICE O/P EST MOD 30 MIN: CPT | Performed by: NURSE PRACTITIONER

## 2017-07-18 RX ORDER — BUPROPION HYDROCHLORIDE 300 MG/1
300 TABLET ORAL EVERY MORNING
Qty: 90 TAB | Refills: 0 | Status: SHIPPED | OUTPATIENT
Start: 2017-07-18 | End: 2017-08-23 | Stop reason: SDUPTHER

## 2017-07-18 NOTE — PROGRESS NOTES
[facility]  PSYCHIATRIC FOLLOW-UP NOTE    Name: Mike Rios  MRN: [id number]  : 1975  Age: 42 y.o.  Date of assessment: 2017  PCP: Vinay Soares D.O.  Persons in attendance: Patient  Total face-to-face time: 25 minutes    REASON FOR VISIT/CHIEF COMPLAINT (as stated by Patient):  Mike Rios is a 42 y.o., White male, attending follow-up appointment for depression and adjustment disorder with anxiety.      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 Does still think about it, but is having less thought rumination and obsessive kinds of thoughts. Patient denies suicidal thoughts, denies any thoughts of harming anyone else. Sleeping a little better at night. Patient is working on coping with the situation, looking to look forward and finish out his career commitment.     PSYCHOSOCIAL CHANGES SINCE PREVIOUS CONTACT:  none      MEDICATION SIDE EFFECTS: none    REVIEW OF SYSTEMS:     General appearance: casually dressed male, neat grooming/hygiene. Pleasant and cooperative.    Constitutional negative - no fever/chills   Eyes not tested   Ears/Nose/Mouth/Throat not tested   Cardiovascular not tested   Respiratory not tested   Gastrointestinal negative - no fever/chills   Genitourinary not tested   Muscular not tested   Integumentary not tested   Neurological negative   Endocrine not tested   Hematologic/Lymphatic not tested       PSYCHIATRIC EXAMINATION/MENTAL STATUS  There were no vitals taken for this visit.  Participation: Active verbal participation, Attentive, Engaged and Open to feedback  Grooming:Casual and Neat  Orientation: Fully Oriented  Eye contact: Good  Behavior:Calm   Mood: Euthymic and reports some mild anxiety continues  Affect: Full range and Congruent with content  Thought process: Logical and Goal-directed  Thought content:   Within normal limits  Speech: Rate within normal limits and Volume within normal limits  Perception:  Within normal limits  Memory:  No gross evidence of memory deficits  Insight: Good  Judgment: Good  Family/couple interaction observations:   Other:    Current risk:    Suicide: Low   Homicide: Low   Self-harm: Low  Relapse: Not applicable  Other:   Crisis Safety Plan reviewed?reviewed use of ER or calling 911 for safety if needed  If evidence of imminent risk is present, intervention/plan:    Medical Records/Labs/Diagnostic Tests Reviewed: none    Medical Records/Labs/Diagnostic Tests Ordered: none    DIAGNOSTIC IMPRESSION(S):  1. Major depressive disorder, recurrent episode, moderate (CMS-HCC)    2. Adjustment disorder with anxiety           ASSESSMENT AND PLAN:  Patient is having less depression and less anxiety, not yet at goal. Continue sertraline for now at current dose, consider dose increase in one month if needed. Continue Wellbutrin XL at 300 mg daily, trazodone prn sleep. Patient reminded he can consider IOP if needed and he does not continue to improve. Patient processed some ways he is coping, discussed healthy appropriate coping he can engage in and keep his anxiety lowered, using exercise to relieve tension currently. Reviewed with patient that sertraline will take time to get full effect.      Current outpatient prescriptions:   •  buPROPion (WELLBUTRIN XL) 300 MG XL tablet, Take 1 Tab by mouth every morning., Disp: 90 Tab, Rfl: 0  •  sertraline (ZOLOFT) 50 MG Tab, Take 1 Tab by mouth every day., Disp: 90 Tab, Rfl: 0  •  trazodone (DESYREL) 50 MG Tab, TAKE 1/2- 1 TABLET BY MOUTH AT BEDTIME AS NEEDED FOR SLEEP, Disp: 90 Tab, Rfl: 0  •  finasteride (PROSCAR) 5 MG Tab, TK 1/4 T PO ONCE D, Disp: , Rfl: 0  •  sumatriptan (IMITREX) 50 MG Tab, , Disp: , Rfl:   •  cyclobenzaprine (FLEXERIL) 10 MG Tab, Take 1 Tab by mouth 3 times a day as needed., Disp: 30 Tab, Rfl: 0  •  vitamin D, Ergocalciferol, (DRISDOL)  43803 UNITS Cap capsule, Take 50,000 Units by mouth., Disp: , Rfl: 11  •  FLUARIX QUADRIVALENT 0.5 ML Suspension Prefilled Syringe injection, TO BE ADMINISTERED BY PHARMACIST FOR IMMUNIZATION, Disp: , Rfl: 0  •  VIAGRA 100 MG tablet, TAKE 1 TABLET BY MOUTH 30MIN BEFORE ACTIVITY, Disp: , Rfl: 2  •  azelastine (ASTELIN) 137 MCG/SPRAY nasal spray, Spray 2 Sprays in nose 2 Times a Day., Disp: , Rfl: 4  •  DYMISTA 137-50 MCG/ACT Suspension, Spray 2 Sprays in nose every day., Disp: , Rfl: 5  •  ibuprofen (MOTRIN) 200 MG Tab, Take 200 mg by mouth every 6 hours as needed., Disp: , Rfl:   •  omeprazole (PRILOSEC) 20 MG delayed-release capsule, Take 20 mg by mouth every day., Disp: , Rfl:   •  hydrocodone-acetaminophen (NORCO) 5-325 MG Tab per tablet, Take 1 Tab by mouth every 6 hours as needed., Disp: 20 Tab, Rfl: 0  •  diclofenac EC (VOLTAREN) 75 MG Tablet Delayed Response, Take 1 Tab by mouth 2 times a day., Disp: 60 Tab, Rfl: 0  •  carisoprodol (SOMA) 350 MG TABS, Take 1 Tab by mouth every 8 hours as needed for Muscle Spasms., Disp: 30 Tab, Rfl: 0  •  Diclofenac Epolamine (FLECTOR) 1.3 % PTCH, 0.5 Patches by Apply externally route every 12 hours as needed., Disp: 5 Each, Rfl: 3    Over 50% of this 24 minute visit was spent counseling patient.       Recheck 1 month or sooner if needed    CAROLE Catherine

## 2017-07-24 RX ORDER — BUPROPION HYDROCHLORIDE 300 MG/1
TABLET ORAL
Qty: 90 TAB | Refills: 0 | Status: SHIPPED | OUTPATIENT
Start: 2017-07-24 | End: 2017-08-23

## 2017-08-23 ENCOUNTER — OFFICE VISIT (OUTPATIENT)
Dept: BEHAVIORAL HEALTH | Facility: PHYSICIAN GROUP | Age: 42
End: 2017-08-23
Payer: OTHER GOVERNMENT

## 2017-08-23 DIAGNOSIS — F33.1 MAJOR DEPRESSIVE DISORDER, RECURRENT EPISODE, MODERATE (HCC): ICD-10-CM

## 2017-08-23 DIAGNOSIS — F43.22 ADJUSTMENT DISORDER WITH ANXIETY: ICD-10-CM

## 2017-08-23 PROCEDURE — 99213 OFFICE O/P EST LOW 20 MIN: CPT | Performed by: NURSE PRACTITIONER

## 2017-08-23 RX ORDER — BUPROPION HYDROCHLORIDE 300 MG/1
300 TABLET ORAL EVERY MORNING
Qty: 90 TAB | Refills: 0 | Status: SHIPPED | OUTPATIENT
Start: 2017-08-23 | End: 2018-01-27 | Stop reason: SDUPTHER

## 2017-08-23 RX ORDER — TRAZODONE HYDROCHLORIDE 50 MG/1
TABLET ORAL
Qty: 90 TAB | Refills: 0 | Status: SHIPPED | OUTPATIENT
Start: 2017-08-23 | End: 2018-01-22 | Stop reason: SDUPTHER

## 2017-08-23 NOTE — PROGRESS NOTES
[facility]  PSYCHIATRIC FOLLOW-UP NOTE    Name: Mike Rios  MRN: [id number]  : 1975  Age: 42 y.o.  Date of assessment: 2017  PCP: Vinay Soaers D.O.  Persons in attendance: Patient  Total face-to-face time: 20 minutes    REASON FOR VISIT/CHIEF COMPLAINT (as stated by Patient):  Mike Rios is a 42 y.o., White male, attending follow-up appointment for recheck depression and anxiety.      HISTORY OF PRESENT ILLNESS:    Patient reports medication is helping him cope through stressful times, does ask if he does indeed have anxiety. Verbalizes he was talking with a friend and realized he may be having more anxiety than he realized. Sleeping a little less at night, wakes early and goes to the gym; at end of the day feels tired and sometimes naps or goes to bed early. Notices he feels a sense of anxiety when he gets an email or  Denies suicidal thoughts or homicidal thoughts. Reports his mother in law has been diagnosed with cancer, is going to try to get an extension in his current position at work to make sure the family can stay in the area to help her through chemo treatment.    PSYCHOSOCIAL CHANGES SINCE PREVIOUS CONTACT:  No drinking or drug use      MEDICATION SIDE EFFECTS: denies    REVIEW OF SYSTEMS:      General appearance: casually dressed  female, good grooming/hygiene. Pleasant and cooperative.   Constitutional negative - no fever/chills   Eyes not tested   Ears/Nose/Mouth/Throat not tested   Cardiovascular not tested   Respiratory not tested   Gastrointestinal negative   Genitourinary not tested   Muscular not tested   Integumentary not tested   Neurological negative   Endocrine not tested   Hematologic/Lymphatic not tested       PSYCHIATRIC EXAMINATION/MENTAL STATUS  There were no vitals taken for this visit.  Participation: Active verbal participation, Attentive, Engaged and Open to feedback  Grooming:Casual and Neat  Orientation: Fully Oriented  Eye  contact: Good  Behavior:Calm   Mood: Euthymic  Affect: Full range and Congruent with content  Thought process: Logical and Goal-directed  Thought content:  Within normal limits  Speech: Rate within normal limits and Volume within normal limits  Perception:  Within normal limits  Memory:  No gross evidence of memory deficits  Insight: Good  Judgment: Good  Family/couple interaction observations:   Other:    Current risk:    Suicide: Low   Homicide: Low   Self-harm: Low  Relapse: Not applicable  Other:   Crisis Safety Plan reviewed?No  If evidence of imminent risk is present, intervention/plan:    Medical Records/Labs/Diagnostic Tests Reviewed: none    Medical Records/Labs/Diagnostic Tests Ordered: none    DIAGNOSTIC IMPRESSION(S):  1. Major depressive disorder, recurrent episode, moderate (CMS-HCC)    2. Adjustment disorder with anxiety           ASSESSMENT AND PLAN:  Increase sertraline for ongoing anxiety. Depression is somewhat improved, anxiety is improving but not at goal. Discussed possibility of taking meds long term vs during short term stressors only, educate patient that if his depression relapses more than once when not on medication recommendation is to stay on medication that works. Reviewed s/s of anxiety with patient.       Current outpatient prescriptions:   •  sertraline (ZOLOFT) 50 MG Tab, Take 1.5 Tabs by mouth every day., Disp: 135 Tab, Rfl: 0  •  buPROPion (WELLBUTRIN XL) 300 MG XL tablet, Take 1 Tab by mouth every morning., Disp: 90 Tab, Rfl: 0  •  trazodone (DESYREL) 50 MG Tab, TAKE 1/2- 1 TABLET BY MOUTH AT BEDTIME AS NEEDED FOR SLEEP, Disp: 90 Tab, Rfl: 0  •  finasteride (PROSCAR) 5 MG Tab, TK 1/4 T PO ONCE D, Disp: , Rfl: 0  •  sumatriptan (IMITREX) 50 MG Tab, , Disp: , Rfl:   •  cyclobenzaprine (FLEXERIL) 10 MG Tab, Take 1 Tab by mouth 3 times a day as needed., Disp: 30 Tab, Rfl: 0  •  vitamin D, Ergocalciferol, (DRISDOL) 78305 UNITS Cap capsule, Take 50,000 Units by mouth., Disp: , Rfl:  11  •  VIAGRA 100 MG tablet, TAKE 1 TABLET BY MOUTH 30MIN BEFORE ACTIVITY, Disp: , Rfl: 2  •  azelastine (ASTELIN) 137 MCG/SPRAY nasal spray, Spray 2 Sprays in nose 2 Times a Day., Disp: , Rfl: 4  •  DYMISTA 137-50 MCG/ACT Suspension, Spray 2 Sprays in nose every day., Disp: , Rfl: 5  •  ibuprofen (MOTRIN) 200 MG Tab, Take 200 mg by mouth every 6 hours as needed., Disp: , Rfl:   •  omeprazole (PRILOSEC) 20 MG delayed-release capsule, Take 20 mg by mouth every day., Disp: , Rfl:   •  hydrocodone-acetaminophen (NORCO) 5-325 MG Tab per tablet, Take 1 Tab by mouth every 6 hours as needed., Disp: 20 Tab, Rfl: 0  •  diclofenac EC (VOLTAREN) 75 MG Tablet Delayed Response, Take 1 Tab by mouth 2 times a day., Disp: 60 Tab, Rfl: 0  •  carisoprodol (SOMA) 350 MG TABS, Take 1 Tab by mouth every 8 hours as needed for Muscle Spasms., Disp: 30 Tab, Rfl: 0  •  Diclofenac Epolamine (FLECTOR) 1.3 % PTCH, 0.5 Patches by Apply externally route every 12 hours as needed., Disp: 5 Each, Rfl: 3  •  FLUARIX QUADRIVALENT 0.5 ML Suspension Prefilled Syringe injection, TO BE ADMINISTERED BY PHARMACIST FOR IMMUNIZATION, Disp: , Rfl: 0      Recheck 6-8 weeks or sooner if needed    MICHAEL Catherine.                   \

## 2017-09-06 ENCOUNTER — HOSPITAL ENCOUNTER (OUTPATIENT)
Dept: LAB | Facility: MEDICAL CENTER | Age: 42
End: 2017-09-06
Attending: PODIATRIST
Payer: OTHER GOVERNMENT

## 2017-09-06 LAB
ERYTHROCYTE [SEDIMENTATION RATE] IN BLOOD BY WESTERGREN METHOD: 11 MM/HOUR (ref 0–15)
RHEUMATOID FACT SER IA-ACNC: <10 IU/ML (ref 0–14)
URATE SERPL-MCNC: 4.2 MG/DL (ref 2.5–8.3)

## 2017-09-06 PROCEDURE — 85652 RBC SED RATE AUTOMATED: CPT

## 2017-09-06 PROCEDURE — 86038 ANTINUCLEAR ANTIBODIES: CPT

## 2017-09-06 PROCEDURE — 84550 ASSAY OF BLOOD/URIC ACID: CPT

## 2017-09-06 PROCEDURE — 36415 COLL VENOUS BLD VENIPUNCTURE: CPT

## 2017-09-06 PROCEDURE — 86431 RHEUMATOID FACTOR QUANT: CPT

## 2017-09-07 ENCOUNTER — OFFICE VISIT (OUTPATIENT)
Dept: URGENT CARE | Facility: PHYSICIAN GROUP | Age: 42
End: 2017-09-07
Payer: OTHER GOVERNMENT

## 2017-09-07 ENCOUNTER — APPOINTMENT (OUTPATIENT)
Dept: RADIOLOGY | Facility: IMAGING CENTER | Age: 42
End: 2017-09-07
Attending: PHYSICIAN ASSISTANT
Payer: OTHER GOVERNMENT

## 2017-09-07 VITALS
HEIGHT: 72 IN | RESPIRATION RATE: 16 BRPM | DIASTOLIC BLOOD PRESSURE: 82 MMHG | HEART RATE: 76 BPM | BODY MASS INDEX: 27.9 KG/M2 | SYSTOLIC BLOOD PRESSURE: 120 MMHG | OXYGEN SATURATION: 100 % | WEIGHT: 206 LBS | TEMPERATURE: 97.6 F

## 2017-09-07 DIAGNOSIS — M25.512 LEFT ANTERIOR SHOULDER PAIN: ICD-10-CM

## 2017-09-07 DIAGNOSIS — M75.112 PARTIAL TEAR OF LEFT ROTATOR CUFF: ICD-10-CM

## 2017-09-07 PROCEDURE — 73000 X-RAY EXAM OF COLLAR BONE: CPT | Mod: TC,LT | Performed by: PHYSICIAN ASSISTANT

## 2017-09-07 PROCEDURE — 99214 OFFICE O/P EST MOD 30 MIN: CPT | Performed by: PHYSICIAN ASSISTANT

## 2017-09-07 ASSESSMENT — ENCOUNTER SYMPTOMS
RESPIRATORY NEGATIVE: 1
TINGLING: 0
SENSORY CHANGE: 0
GASTROINTESTINAL NEGATIVE: 1
WEAKNESS: 1
CARDIOVASCULAR NEGATIVE: 1
NUMBNESS: 0

## 2017-09-07 NOTE — PROGRESS NOTES
Subjective:      Mike Rios is a 42 y.o. male who presents with Clavicle Pain (L. side clavicle and muscle pain X 2 months Pt. states that they felt like they pulled their muscle while working out. Pt. states that he has had pain ever since. )            Shoulder Pain   This is a chronic problem. The current episode started more than 1 year ago. The problem occurs daily. The problem has been waxing and waning. Associated symptoms include weakness. Pertinent negatives include no numbness. Exacerbated by: Overhead, pushups. He has tried acetaminophen for the symptoms. The treatment provided mild relief.   Chronic left shoulder complaints. MRI completed last year showed a partial tear of his rotator cuff. Since then he has gone through PT for a trapezium muscle strain. Today he presents with anterior left shoulder/clavicle pain.      PMH:  has a past medical history of Back pain; Central sleep apnea; Depression; and FLIP (obstructive sleep apnea). He also has no past medical history of ASTHMA; Diabetes; Seizure (CMS-formerly [facility]); or Ulcer (CMS-HCC).  MEDS:   Current Outpatient Prescriptions:   •  sertraline (ZOLOFT) 50 MG Tab, Take 1.5 Tabs by mouth every day., Disp: 135 Tab, Rfl: 0  •  buPROPion (WELLBUTRIN XL) 300 MG XL tablet, Take 1 Tab by mouth every morning., Disp: 90 Tab, Rfl: 0  •  trazodone (DESYREL) 50 MG Tab, TAKE 1/2- 1 TABLET BY MOUTH AT BEDTIME AS NEEDED FOR SLEEP, Disp: 90 Tab, Rfl: 0  •  finasteride (PROSCAR) 5 MG Tab, TK 1/4 T PO ONCE D, Disp: , Rfl: 0  •  sumatriptan (IMITREX) 50 MG Tab, , Disp: , Rfl:   •  cyclobenzaprine (FLEXERIL) 10 MG Tab, Take 1 Tab by mouth 3 times a day as needed., Disp: 30 Tab, Rfl: 0  •  vitamin D, Ergocalciferol, (DRISDOL) 68861 UNITS Cap capsule, Take 50,000 Units by mouth., Disp: , Rfl: 11  •  FLUARIX QUADRIVALENT 0.5 ML Suspension Prefilled Syringe injection, TO BE ADMINISTERED BY PHARMACIST FOR IMMUNIZATION, Disp: , Rfl: 0  •  VIAGRA 100 MG tablet, TAKE 1 TABLET BY  MOUTH 30MIN BEFORE ACTIVITY, Disp: , Rfl: 2  •  azelastine (ASTELIN) 137 MCG/SPRAY nasal spray, Spray 2 Sprays in nose 2 Times a Day., Disp: , Rfl: 4  •  DYMISTA 137-50 MCG/ACT Suspension, Spray 2 Sprays in nose every day., Disp: , Rfl: 5  •  ibuprofen (MOTRIN) 200 MG Tab, Take 200 mg by mouth every 6 hours as needed., Disp: , Rfl:   •  omeprazole (PRILOSEC) 20 MG delayed-release capsule, Take 20 mg by mouth every day., Disp: , Rfl:   •  hydrocodone-acetaminophen (NORCO) 5-325 MG Tab per tablet, Take 1 Tab by mouth every 6 hours as needed., Disp: 20 Tab, Rfl: 0  •  diclofenac EC (VOLTAREN) 75 MG Tablet Delayed Response, Take 1 Tab by mouth 2 times a day., Disp: 60 Tab, Rfl: 0  •  carisoprodol (SOMA) 350 MG TABS, Take 1 Tab by mouth every 8 hours as needed for Muscle Spasms., Disp: 30 Tab, Rfl: 0  •  Diclofenac Epolamine (FLECTOR) 1.3 % PTCH, 0.5 Patches by Apply externally route every 12 hours as needed., Disp: 5 Each, Rfl: 3  ALLERGIES:   Allergies   Allergen Reactions   • Pcn [Penicillins]      SURGHX:   Past Surgical History:   Procedure Laterality Date   • HERNIA REPAIR       SOCHX:  reports that he has never smoked. He has never used smokeless tobacco. He reports that he does not drink alcohol or use drugs.  FH: family history includes Heart Disease in his father; Hyperlipidemia in his father; Hypertension in his father.      Review of Systems   HENT: Negative.    Respiratory: Negative.    Cardiovascular: Negative.    Gastrointestinal: Negative.    Musculoskeletal: Positive for joint pain (Left shoulder).        Left clavicle   Neurological: Positive for weakness. Negative for tingling, sensory change and numbness.       Medications, Allergies, and current problem list reviewed today in Epic     Objective:     /82   Pulse 76   Temp 36.4 °C (97.6 °F)   Resp 16   Ht 1.829 m (6')   Wt 93.4 kg (206 lb)   SpO2 100%   BMI 27.94 kg/m²      Physical Exam   Constitutional: He is oriented to person, place,  and time. He appears well-developed and well-nourished. No distress.   HENT:   Head: Normocephalic and atraumatic.   Eyes: Conjunctivae and EOM are normal.   Neck: Normal range of motion. Neck supple.   Cardiovascular: Normal rate, regular rhythm and normal heart sounds.    No murmur heard.  Pulmonary/Chest: Effort normal and breath sounds normal. No respiratory distress. He has no wheezes.   Musculoskeletal:        Left shoulder: He exhibits tenderness, bony tenderness and pain. He exhibits normal range of motion, no swelling, no crepitus, no deformity, no spasm and normal strength.        Arms:  Neurological: He is alert and oriented to person, place, and time.   Skin: Skin is warm and dry. He is not diaphoretic.   Psychiatric: He has a normal mood and affect. His behavior is normal. Judgment and thought content normal.   Nursing note and vitals reviewed.         Xray: no fracture or dislocation by my read. Radiology review pending.     Assessment/Plan:     1. Left anterior shoulder pain  DX-CLAVICLE LEFT    REFERRAL TO ORTHOPEDICS   2. Partial tear of left rotator cuff  REFERRAL TO ORTHOPEDICS     Chronic complaints. Referral to orthopedics for further workup and management.  OTC meds and conservative measures as discussed  Return to clinic or go to ED if symptoms worsen or persist. Indications for ED discussed at length. Patient voices understanding. Follow-up with your primary care provider in 3-5 days. Red flags discussed.    Please note that this dictation was created using voice recognition software. I have made every reasonable attempt to correct obvious errors, but I expect that there are errors of grammar and possibly content that I did not discover before finalizing the note.

## 2017-09-08 LAB
NUCLEAR IGG SER QL IA: DETECTED
NUCLEAR IGG TITR SER IF: ABNORMAL {TITER}

## 2017-09-19 RX ORDER — BUPROPION HYDROCHLORIDE 150 MG/1
TABLET ORAL
Qty: 90 TAB | Refills: 0 | Status: SHIPPED | OUTPATIENT
Start: 2017-09-19 | End: 2017-12-19 | Stop reason: SDUPTHER

## 2017-09-28 ENCOUNTER — APPOINTMENT (OUTPATIENT)
Dept: BEHAVIORAL HEALTH | Facility: PHYSICIAN GROUP | Age: 42
End: 2017-09-28
Payer: OTHER GOVERNMENT

## 2017-10-04 ENCOUNTER — OFFICE VISIT (OUTPATIENT)
Dept: BEHAVIORAL HEALTH | Facility: PHYSICIAN GROUP | Age: 42
End: 2017-10-04
Payer: OTHER GOVERNMENT

## 2017-10-04 VITALS — HEIGHT: 72 IN | SYSTOLIC BLOOD PRESSURE: 120 MMHG | DIASTOLIC BLOOD PRESSURE: 80 MMHG | RESPIRATION RATE: 14 BRPM

## 2017-10-04 DIAGNOSIS — F33.1 MAJOR DEPRESSIVE DISORDER, RECURRENT EPISODE, MODERATE (HCC): ICD-10-CM

## 2017-10-04 DIAGNOSIS — G47.00 INSOMNIA, UNSPECIFIED TYPE: ICD-10-CM

## 2017-10-04 DIAGNOSIS — F43.22 ADJUSTMENT DISORDER WITH ANXIETY: ICD-10-CM

## 2017-10-04 PROCEDURE — 99214 OFFICE O/P EST MOD 30 MIN: CPT | Performed by: NURSE PRACTITIONER

## 2017-10-04 NOTE — PROGRESS NOTES
[facility]  PSYCHIATRIC FOLLOW-UP NOTE    Name: Mike Rios  MRN: [id number]  : 1975  Age: 42 y.o.  Date of assessment: 10/4/2017  PCP: Vinay Soares D.O.  Persons in attendance: Patient  Total face-to-face time: 18 minutes    REASON FOR VISIT/CHIEF COMPLAINT (as stated by Patient):  Mike Rios is a 42 y.o., White male, attending follow-up appointment for depression, anxiety, insomnia (3 conditions).      HISTORY OF PRESENT ILLNESS:    Depression: patient continues to feel somewhat down, has less motivation than he used to for reading and things he typically enjoys. Denies suicidal thoughts. Denies plan or intent to harm others. Feels his medications are helping, is still taking bupropion  mg daily and sertraline 75 mg daily. Verbalizes he believes at this point he needs to focus on stopping his negative thoughts as that is what makes him feel low.    Anxiety: continues, work stress has not changed. Tries to avoid situations that trigger uncomfortable feelings. Feels sertraline has helped him remain calmer with less panic symptoms.     Insomnia: taking trazodone for sleep is helpful, sometimes notices his mood affects how well he sleeps. Using CPAP regularly helps his sleep quality.     PSYCHOSOCIAL CHANGES SINCE PREVIOUS CONTACT:  none      MEDICATION SIDE EFFECTS: none    REVIEW OF SYSTEMS:      General appearance: casually dressed  male, neatly groomed/dressed. Pleasant and cooperative.   Constitutional Negative-no fever/cihlls   Eyes not tested   Ears/Nose/Mouth/Throat not tested   Cardiovascular not tested   Respiratory not tested   Gastrointestinal positive - taking omeprazole for GERD, if he does not take it notices he feels heartburn at night   Genitourinary not tested   Muscular not tested   Integumentary not tested   Neurological negative   Endocrine not tested   Hematologic/Lymphatic not tested       PSYCHIATRIC EXAMINATION/MENTAL STATUS  /80   Resp  14    1.829 m (6')   Participation: Active verbal participation, Attentive, Engaged and Open to feedback  Grooming:Casual and Neat  Orientation: Fully Oriented  Eye contact: Good  Behavior:Calm   Mood: Euthymic  Affect: Full range and Congruent with content  Thought process: Logical and Goal-directed  Thought content:  Within normal limits  Speech: Rate within normal limits and Volume within normal limits  Perception:  Within normal limits  Memory:  No gross evidence of memory deficits  Insight: Good  Judgment: Good  Family/couple interaction observations:   Other:    Current risk:    Suicide: Low   Homicide: Low   Self-harm: Low  Relapse: Not applicable  Other:   Crisis Safety Plan reviewed?911/ER for suicidal thoughts  If evidence of imminent risk is present, intervention/plan:    Medical Records/Labs/Diagnostic Tests Reviewed: none    Medical Records/Labs/Diagnostic Tests Ordered: none    DIAGNOSTIC IMPRESSION(S):  1. Major depressive disorder, recurrent episode, moderate (CMS-HCC)    2. Adjustment disorder with anxiety    3. Insomnia, unspecified type           ASSESSMENT AND PLAN:  Depression and anxiety continue. Patient declines dose increase of sertraline, wants to work on therapy tools for awhile as he feels like that is what he needs at this time. Discussed we can reconsider increasing sertraline dose at next visit or any time sooner if he chooses as provider thinks it would be helpful for his mood.  Insomnia-managing with medication,encourage CPAP use       Current Outpatient Prescriptions:   •  sertraline (ZOLOFT) 50 MG Tab, Take 1.5 Tabs by mouth every day., Disp: 135 Tab, Rfl: 0  •  buPROPion (WELLBUTRIN XL) 300 MG XL tablet, Take 1 Tab by mouth every morning., Disp: 90 Tab, Rfl: 0  •  trazodone (DESYREL) 50 MG Tab, TAKE 1/2- 1 TABLET BY MOUTH AT BEDTIME AS NEEDED FOR SLEEP, Disp: 90 Tab, Rfl: 0  •  finasteride (PROSCAR) 5 MG Tab, TK 1/4 T PO ONCE D, Disp: , Rfl: 0  •  sumatriptan (IMITREX) 50 MG  Tab, , Disp: , Rfl:   •  cyclobenzaprine (FLEXERIL) 10 MG Tab, Take 1 Tab by mouth 3 times a day as needed., Disp: 30 Tab, Rfl: 0  •  vitamin D, Ergocalciferol, (DRISDOL) 69843 UNITS Cap capsule, Take 50,000 Units by mouth., Disp: , Rfl: 11  •  VIAGRA 100 MG tablet, TAKE 1 TABLET BY MOUTH 30MIN BEFORE ACTIVITY, Disp: , Rfl: 2  •  azelastine (ASTELIN) 137 MCG/SPRAY nasal spray, Spray 2 Sprays in nose 2 Times a Day., Disp: , Rfl: 4  •  DYMISTA 137-50 MCG/ACT Suspension, Spray 2 Sprays in nose every day., Disp: , Rfl: 5  •  ibuprofen (MOTRIN) 200 MG Tab, Take 200 mg by mouth every 6 hours as needed., Disp: , Rfl:   •  omeprazole (PRILOSEC) 20 MG delayed-release capsule, Take 20 mg by mouth every day., Disp: , Rfl:   •  hydrocodone-acetaminophen (NORCO) 5-325 MG Tab per tablet, Take 1 Tab by mouth every 6 hours as needed., Disp: 20 Tab, Rfl: 0  •  diclofenac EC (VOLTAREN) 75 MG Tablet Delayed Response, Take 1 Tab by mouth 2 times a day., Disp: 60 Tab, Rfl: 0  •  carisoprodol (SOMA) 350 MG TABS, Take 1 Tab by mouth every 8 hours as needed for Muscle Spasms., Disp: 30 Tab, Rfl: 0  •  Diclofenac Epolamine (FLECTOR) 1.3 % PTCH, 0.5 Patches by Apply externally route every 12 hours as needed., Disp: 5 Each, Rfl: 3  •  buPROPion (WELLBUTRIN XL) 150 MG XL tablet, TAKE 1 TABLET BY MOUTH ONCE DAILY IN THE MORNING, Disp: 90 Tab, Rfl: 0  •  FLUARIX QUADRIVALENT 0.5 ML Suspension Prefilled Syringe injection, TO BE ADMINISTERED BY PHARMACIST FOR IMMUNIZATION, Disp: , Rfl: 0          CAROLE Catherine

## 2017-10-16 RX ORDER — TRAZODONE HYDROCHLORIDE 50 MG/1
TABLET ORAL
Qty: 90 TAB | Refills: 0 | Status: SHIPPED | OUTPATIENT
Start: 2017-10-16 | End: 2017-11-15

## 2017-10-17 ENCOUNTER — DOCUMENTATION (OUTPATIENT)
Dept: BEHAVIORAL HEALTH | Facility: PHYSICIAN GROUP | Age: 42
End: 2017-10-17

## 2017-10-17 NOTE — PROGRESS NOTES
Patient informed that all therapy appts after Nov 1 will take place at the [address] [place] [address] location with June Canela.

## 2017-10-28 RX ORDER — BUPROPION HYDROCHLORIDE 300 MG/1
TABLET ORAL
Qty: 90 TAB | Refills: 0 | Status: SHIPPED | OUTPATIENT
Start: 2017-10-28 | End: 2017-11-15

## 2017-11-02 ENCOUNTER — OFFICE VISIT (OUTPATIENT)
Dept: BEHAVIORAL HEALTH | Facility: PHYSICIAN GROUP | Age: 42
End: 2017-11-02
Payer: OTHER GOVERNMENT

## 2017-11-02 DIAGNOSIS — F43.22 ADJUSTMENT DISORDER WITH ANXIETY: ICD-10-CM

## 2017-11-02 DIAGNOSIS — F33.1 MAJOR DEPRESSIVE DISORDER, RECURRENT EPISODE, MODERATE (HCC): ICD-10-CM

## 2017-11-02 PROCEDURE — 90834 PSYTX W PT 45 MINUTES: CPT | Performed by: PSYCHOLOGIST

## 2017-11-02 NOTE — BH THERAPY
[facility]  Therapy Progress Note    Patient Name: Mike Rios  Patient MRN: [id number]  Today's Date: 11/2/2017     Type of session:Individual psychotherapy  Length of session: 45  Persons in attendance:Patient    Subjective/New Info: Pt reports he feels like his mood is more stable on meds. Still feels very stressed by nature of his job - experiences a sense of dread and anxiety when his higher ups contact him. Pt disc his hope of being transferred to [employer] [facility] - preferably [place] - so he can easily see family on visits who will stay in [place]. Disc his worry of afording college for daughter. Pt counting down the days until he retires - has a hard time reminding himself that his time in the  is temporary. Struggles with accepting that people are going to be demanding, blaming, and disrespectful and to let it slide off him. Will keep trying.     Objective/Observations:   Participation: Active verbal participation, Attentive, Engaged and Open to feedback   Grooming: Good   Cognition: Alert and Fully Oriented   Eye contact: Good   Mood: Anxious   Affect: Congruent with content   Thought process: Logical and Goal-directed   Speech: Rate within normal limits   Other:     Diagnoses:   1. Major depressive disorder, recurrent episode, moderate (CMS-HCC)    2. Adjustment disorder with anxiety         Current risk:   SUICIDE: Low   Homicide: Not applicable   Self-harm: Not applicable   Relapse: Not applicable   Other:    Safety Plan reviewed? Not Indicated   If evidence of imminent risk is present, intervention/plan:     Therapeutic Intervention(s): Behavior:  Behavioral activation, Conflict clarification, Distress tolerance skills, Self-care skills, Stressors assessed and Supportive psychotherapy    Treatment Goal(s)/Objective(s) addressed: Tx plan:  - Utilize learned skills to manage mood and emotional suffering more effectively  - Identify goals/values and cultivate a meaningful life  - Learn  to be more emotionally flexible/resilient in times of stress/challenges  - Eliminate SI/self harming behaviors & increase sense of hope/optimisim        Progress toward Treatment Goals: Moderate improvement    Plan:  - Continue Individual therapy and Medication management    June Canela, Ph.D.  11/2/2017

## 2017-11-15 ENCOUNTER — OFFICE VISIT (OUTPATIENT)
Dept: BEHAVIORAL HEALTH | Facility: PHYSICIAN GROUP | Age: 42
End: 2017-11-15
Payer: OTHER GOVERNMENT

## 2017-11-15 VITALS — RESPIRATION RATE: 15 BRPM | DIASTOLIC BLOOD PRESSURE: 82 MMHG | HEART RATE: 74 BPM | SYSTOLIC BLOOD PRESSURE: 128 MMHG

## 2017-11-15 DIAGNOSIS — G47.09 OTHER INSOMNIA: ICD-10-CM

## 2017-11-15 DIAGNOSIS — F43.22 ADJUSTMENT DISORDER WITH ANXIETY: ICD-10-CM

## 2017-11-15 DIAGNOSIS — F33.1 MAJOR DEPRESSIVE DISORDER, RECURRENT EPISODE, MODERATE (HCC): ICD-10-CM

## 2017-11-15 PROCEDURE — 99213 OFFICE O/P EST LOW 20 MIN: CPT | Performed by: NURSE PRACTITIONER

## 2017-11-15 RX ORDER — SUMATRIPTAN 50 MG/1
50 TABLET, FILM COATED ORAL
COMMUNITY
End: 2018-08-22 | Stop reason: SDUPTHER

## 2017-11-15 NOTE — PROGRESS NOTES
[facility]  PSYCHIATRIC FOLLOW-UP NOTE    Name: Mike Rios  MRN: [id number]  : 1975  Age: 42 y.o.  Date of assessment: 11/15/2017  PCP: Vinay Soares D.O.  Persons in attendance: Patient  Total face-to-face time: 14 minutes    REASON FOR VISIT/CHIEF COMPLAINT (as stated by Patient):  Mike Rios is a 42 y.o., White male, attending follow-up appointment for depression, anxiety, insomnia.      HISTORY OF PRESENT ILLNESS:    Verbalizes his mood is getting better, is having better motivation and less depression. Currently he is taking Wellbutrin  mg daily and sertraline 75mg daily, uses trazodone to help him sleep and he is sleeping well at night. Continues to feel anxious at times, noticing more that ongoing muscle pain in his shoulder region makes him feel anxious because he cannot seem to stay in a comfortable position. Denies thoughts of harming himself or anyone else. Concentrating his thoughts on the future. Work stress continues, is doing his best to reduce the triggers for anxiety as they come.     PSYCHOSOCIAL CHANGES SINCE PREVIOUS CONTACT:  none    MEDICATION SIDE EFFECTS: none    REVIEW OF SYSTEMS:     General appearance: professionally dressed  male, neatly groomed with good hygiene   Constitutional negative - no fever/chills   Eyes not tested   Ears/Nose/Mouth/Throat not tested   Cardiovascular not tested   Respiratory not tested   Gastrointestinal negative   Genitourinary not tested   Muscular positive - see HPI   Integumentary not tested   Neurological positive - migraines have increased   Endocrine not tested   Hematologic/Lymphatic not tested       PSYCHIATRIC EXAMINATION/MENTAL STATUS  /82   Pulse 74   Resp 15   Participation: Active verbal participation, Attentive, Engaged and Open to feedback  Grooming:Casual and Neat  Orientation: Fully Oriented  Eye contact: Good  Behavior:Calm   Mood: Euthymic  Affect: Full range and Congruent with  content  Thought process: Logical and Goal-directed  Thought content:  Within normal limits  Speech: Rate within normal limits and Volume within normal limits  Perception:  Within normal limits  Memory:  No gross evidence of memory deficits  Insight: Good  Judgment: Good  Family/couple interaction observations:   Other:    Current risk:    Suicide: Low   Homicide: Low   Self-harm: Low  Relapse: Not applicable  Other:   Crisis Safety Plan reviewed?No  If evidence of imminent risk is present, intervention/plan:    Medical Records/Labs/Diagnostic Tests Reviewed: none    Medical Records/Labs/Diagnostic Tests Ordered: none    DIAGNOSTIC IMPRESSION(S):  1. Major depressive disorder, recurrent episode, moderate (CMS-HCC)    2. Adjustment disorder with anxiety    3. Other insomnia           ASSESSMENT AND PLAN:  Depression is improving, anxiety situational mainly related to work stress. Continue current medications, encourage patient to continue to work in individual therapy.      Current Outpatient Prescriptions:   •  sumatriptan (IMITREX) 50 MG Tab, Take 50 mg by mouth Once PRN for Migraine., Disp: , Rfl:   •  sertraline (ZOLOFT) 50 MG Tab, Take 1.5 Tabs by mouth every day., Disp: 135 Tab, Rfl: 0  •  buPROPion (WELLBUTRIN XL) 300 MG XL tablet, Take 1 Tab by mouth every morning., Disp: 90 Tab, Rfl: 0  •  trazodone (DESYREL) 50 MG Tab, TAKE 1/2- 1 TABLET BY MOUTH AT BEDTIME AS NEEDED FOR SLEEP, Disp: 90 Tab, Rfl: 0  •  finasteride (PROSCAR) 5 MG Tab, TK 1/4 T PO ONCE D, Disp: , Rfl: 0  •  sumatriptan (IMITREX) 50 MG Tab, , Disp: , Rfl:   •  cyclobenzaprine (FLEXERIL) 10 MG Tab, Take 1 Tab by mouth 3 times a day as needed., Disp: 30 Tab, Rfl: 0  •  vitamin D, Ergocalciferol, (DRISDOL) 00050 UNITS Cap capsule, Take 50,000 Units by mouth., Disp: , Rfl: 11  •  VIAGRA 100 MG tablet, TAKE 1 TABLET BY MOUTH 30MIN BEFORE ACTIVITY, Disp: , Rfl: 2  •  azelastine (ASTELIN) 137 MCG/SPRAY nasal spray, Spray 2 Sprays in nose 2 Times a  Day., Disp: , Rfl: 4  •  DYMISTA 137-50 MCG/ACT Suspension, Spray 2 Sprays in nose every day., Disp: , Rfl: 5  •  ibuprofen (MOTRIN) 200 MG Tab, Take 200 mg by mouth every 6 hours as needed., Disp: , Rfl:   •  omeprazole (PRILOSEC) 20 MG delayed-release capsule, Take 20 mg by mouth every day., Disp: , Rfl:   •  hydrocodone-acetaminophen (NORCO) 5-325 MG Tab per tablet, Take 1 Tab by mouth every 6 hours as needed., Disp: 20 Tab, Rfl: 0  •  diclofenac EC (VOLTAREN) 75 MG Tablet Delayed Response, Take 1 Tab by mouth 2 times a day., Disp: 60 Tab, Rfl: 0  •  carisoprodol (SOMA) 350 MG TABS, Take 1 Tab by mouth every 8 hours as needed for Muscle Spasms., Disp: 30 Tab, Rfl: 0  •  Diclofenac Epolamine (FLECTOR) 1.3 % PTCH, 0.5 Patches by Apply externally route every 12 hours as needed., Disp: 5 Each, Rfl: 3  •  buPROPion (WELLBUTRIN XL) 150 MG XL tablet, TAKE 1 TABLET BY MOUTH ONCE DAILY IN THE MORNING, Disp: 90 Tab, Rfl: 0  •  FLUARIX QUADRIVALENT 0.5 ML Suspension Prefilled Syringe injection, TO BE ADMINISTERED BY PHARMACIST FOR IMMUNIZATION, Disp: , Rfl: 0    Recheck 2 months or sooner if needed        CAROLE Catherine

## 2017-12-07 ENCOUNTER — OFFICE VISIT (OUTPATIENT)
Dept: BEHAVIORAL HEALTH | Facility: PHYSICIAN GROUP | Age: 42
End: 2017-12-07
Payer: OTHER GOVERNMENT

## 2017-12-07 DIAGNOSIS — F41.1 GAD (GENERALIZED ANXIETY DISORDER): ICD-10-CM

## 2017-12-07 DIAGNOSIS — F33.1 MAJOR DEPRESSIVE DISORDER, RECURRENT EPISODE, MODERATE (HCC): ICD-10-CM

## 2017-12-07 PROCEDURE — 90834 PSYTX W PT 45 MINUTES: CPT | Performed by: PSYCHOLOGIST

## 2017-12-07 NOTE — BH THERAPY
[facility]  Therapy Progress Note    Patient Name: Mike Rios  Patient MRN: [id number]  Today's Date: 12/7/2017     Type of session:Individual psychotherapy  Length of session: 45  Persons in attendance:Patient    Subjective/New Info: Pt still feels very stressed by nature of his job - works hard to avoid three individuals and ruminates on upset, angry feelings when he is unable to avoid them. Pt disappointed he did not get the transfer to  - now has to apply to other places - if he still doesn't get what he wants,  can place him anywhere in the country and he will be far away from family in [place]. Pt disc the stressor of his children's mental health issues. Both have speech delay and 5 yo son is being evaluated bc he has started speaking yet.     Objective/Observations:   Participation: Active verbal participation, Attentive, Engaged and Open to feedback   Grooming: Good   Cognition: Alert and Fully Oriented   Eye contact: Good   Mood: Anxious   Affect: Constricted   Thought process: Logical and Goal-directed   Speech: Rate within normal limits   Other:     Diagnoses:   1. Major depressive disorder, recurrent episode, moderate (CMS-HCC)    2. LAKHWINDER (generalized anxiety disorder)         Current risk:   SUICIDE: Low   Homicide: Not applicable   Self-harm: Not applicable   Relapse: Not applicable   Other:    Safety Plan reviewed? Not Indicated   If evidence of imminent risk is present, intervention/plan:     Therapeutic Intervention(s): Behavior:  Behavioral activation, Conflict clarification, Distress tolerance skills, Self-care skills, Stressors assessed and Supportive psychotherapy    Treatment Goal(s)/Objective(s) addressed: Tx plan:  - Utilize learned skills to manage mood and emotional suffering more effectively  - Identify goals/values and cultivate a meaningful life  - Learn to be more emotionally flexible/resilient in times of stress/challenges  - Eliminate SI/self harming  behaviors & increase sense of hope/optimisim        Progress toward Treatment Goals: Moderate improvement    Plan:  - Continue Individual therapy and Medication management    June Canela, Ph.D.  12/7/2017

## 2017-12-19 RX ORDER — BUPROPION HYDROCHLORIDE 150 MG/1
TABLET ORAL
Qty: 90 TAB | Refills: 0 | Status: SHIPPED | OUTPATIENT
Start: 2017-12-19 | End: 2018-12-27

## 2018-01-22 RX ORDER — TRAZODONE HYDROCHLORIDE 50 MG/1
TABLET ORAL
Qty: 90 TAB | Refills: 0 | Status: SHIPPED | OUTPATIENT
Start: 2018-01-22 | End: 2018-04-21 | Stop reason: SDUPTHER

## 2018-01-22 RX ORDER — BUPROPION HYDROCHLORIDE 150 MG/1
TABLET ORAL
OUTPATIENT
Start: 2018-01-22

## 2018-01-22 NOTE — TELEPHONE ENCOUNTER
Was the patient seen in the last year in this department? Yes     Does patient have an active prescription for medications requested? Yes     Received Request Via: Pharmacy     FYI: Pt last seen 11/2017

## 2018-01-29 RX ORDER — BUPROPION HYDROCHLORIDE 300 MG/1
TABLET ORAL
Qty: 90 TAB | Refills: 0 | Status: SHIPPED | OUTPATIENT
Start: 2018-01-29 | End: 2018-09-10 | Stop reason: SDUPTHER

## 2018-04-24 ENCOUNTER — OFFICE VISIT (OUTPATIENT)
Dept: MEDICAL GROUP | Facility: MEDICAL CENTER | Age: 43
End: 2018-04-24
Payer: OTHER GOVERNMENT

## 2018-04-24 VITALS
OXYGEN SATURATION: 98 % | HEIGHT: 72 IN | DIASTOLIC BLOOD PRESSURE: 80 MMHG | WEIGHT: 204 LBS | SYSTOLIC BLOOD PRESSURE: 112 MMHG | HEART RATE: 68 BPM | BODY MASS INDEX: 27.63 KG/M2 | TEMPERATURE: 97.7 F

## 2018-04-24 DIAGNOSIS — J30.89 CHRONIC NON-SEASONAL ALLERGIC RHINITIS, UNSPECIFIED TRIGGER: ICD-10-CM

## 2018-04-24 DIAGNOSIS — T78.40XS ALLERGIC REACTION, SEQUELA: ICD-10-CM

## 2018-04-24 DIAGNOSIS — F41.1 GAD (GENERALIZED ANXIETY DISORDER): ICD-10-CM

## 2018-04-24 DIAGNOSIS — K21.9 GASTROESOPHAGEAL REFLUX DISEASE, ESOPHAGITIS PRESENCE NOT SPECIFIED: ICD-10-CM

## 2018-04-24 DIAGNOSIS — M25.512 CHRONIC LEFT SHOULDER PAIN: ICD-10-CM

## 2018-04-24 DIAGNOSIS — G47.33 OBSTRUCTIVE SLEEP APNEA: ICD-10-CM

## 2018-04-24 DIAGNOSIS — N52.2 DRUG-INDUCED ERECTILE DYSFUNCTION: ICD-10-CM

## 2018-04-24 DIAGNOSIS — Z76.89 ENCOUNTER TO ESTABLISH CARE: ICD-10-CM

## 2018-04-24 DIAGNOSIS — F33.1 MAJOR DEPRESSIVE DISORDER, RECURRENT EPISODE, MODERATE (HCC): ICD-10-CM

## 2018-04-24 DIAGNOSIS — G89.29 CHRONIC LEFT SHOULDER PAIN: ICD-10-CM

## 2018-04-24 DIAGNOSIS — M54.42 CHRONIC LEFT-SIDED LOW BACK PAIN WITH LEFT-SIDED SCIATICA: ICD-10-CM

## 2018-04-24 DIAGNOSIS — G89.29 CHRONIC LEFT-SIDED LOW BACK PAIN WITH LEFT-SIDED SCIATICA: ICD-10-CM

## 2018-04-24 DIAGNOSIS — M22.2X2 PATELLOFEMORAL PAIN SYNDROME OF LEFT KNEE: ICD-10-CM

## 2018-04-24 DIAGNOSIS — G47.09 OTHER INSOMNIA: ICD-10-CM

## 2018-04-24 PROBLEM — T78.02XA: Status: ACTIVE | Noted: 2018-04-24

## 2018-04-24 PROBLEM — T78.40XA ALLERGIC REACTION: Status: ACTIVE | Noted: 2018-04-24

## 2018-04-24 PROBLEM — T78.40XA ALLERGIC REACTION CAUSED BY A DRUG: Status: ACTIVE | Noted: 2018-04-24

## 2018-04-24 PROCEDURE — 99204 OFFICE O/P NEW MOD 45 MIN: CPT | Performed by: PHYSICIAN ASSISTANT

## 2018-04-24 RX ORDER — TRAZODONE HYDROCHLORIDE 50 MG/1
TABLET ORAL
Qty: 90 TAB | Refills: 0 | Status: SHIPPED | OUTPATIENT
Start: 2018-04-24 | End: 2018-09-10 | Stop reason: SDUPTHER

## 2018-04-24 RX ORDER — EPINEPHRINE 0.3 MG/.3ML
0.3 INJECTION SUBCUTANEOUS ONCE
Qty: 0.3 ML | Refills: 1 | Status: SHIPPED | OUTPATIENT
Start: 2018-04-24 | End: 2018-04-24

## 2018-04-24 RX ORDER — HYDROCODONE BITARTRATE AND ACETAMINOPHEN 5; 325 MG/1; MG/1
1 TABLET ORAL EVERY 6 HOURS PRN
Qty: 30 TAB | Refills: 0 | Status: SHIPPED | OUTPATIENT
Start: 2018-04-24 | End: 2018-05-24

## 2018-04-24 ASSESSMENT — PATIENT HEALTH QUESTIONNAIRE - PHQ9: CLINICAL INTERPRETATION OF PHQ2 SCORE: 0

## 2018-04-24 NOTE — PROGRESS NOTES
Subjective:   Mike Rios is a 43 y.o. male here today for salvaging care for chronic left shoulder, left knee and low back pain.    Chronic left shoulder pain  Also complains of a chronic history of left shoulder pain. Is seen by orthopedics. MRI of his shoulder showed the following:    Impression       1.  Mild increased signal within the bursal surface of the supraspinatus tendon may indicate a mild or shallow bursal surface partial tear. No full-thickness rotator cuff tear.    2.  Downsloping acromion.     Statuses at PT and other workup without any relief. Pain is exacerbated by his trainings. States that he will take Norco and ibuprofen as directed. Also takes diclofenac products.    Patellofemoral pain syndrome of left knee  This is a 43-year-old male who is here today to establish care. 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 His family will stay here. Has a chronic history of left knee pain. Was diagnosed with patellar femoral syndrome. He has gone to PT. No improvement. States he will take Norco as needed for pain. Symptoms of pain occur with increased use of activity.    Chronic left-sided low back pain with left-sided sciatica  Also complains of chronic low back pain with left-sided sciatica. Again symptoms are exacerbated by activities and trainings. He'll take Norco as needed. Also takes a muscle relaxer. In the past has been on both Flexeril and Soma. He states he has seen [facility] in the past. Has been to PT.    Major depressive disorder, recurrent episode, moderate (CMS-HCC)  Has chronic depression. Is on Wellbutrin and Zoloft. Follows with psychiatry.    LAKHWINDER (generalized anxiety disorder)  Also has chronic anxiety disorder. Sees psychiatry. He is on the medications above.    Chronic non-seasonal allergic rhinitis  Has chronic allergic rhinitis. Follows with allergy. Currently provided allergy shots.  Has watery eyes and itchy throat as well as sinus congestion and drainage. Uses Astelin and other medications. Today his eyes are watery.    Drug-induced erectile dysfunction  Has drug-induced erectile dysfunction secondary to the SSRI. Takes Viagra as needed. Does need a refill.    Gastroesophageal reflux disease  Has chronic reflux. Takes a PPI daily. Symptoms are controlled.    Obstructive sleep apnea  Has sleep apnea. He wears CPAP. Follows with pulmonology.    Other insomnia  Has intermittent insomnia. Takes trazodone as needed. Chronic history.    Chronic migraine  Has chronic migraines with photophobia. Uses Imitrex as needed.    Allergic reaction  Has allergies to shellfish as well as to possible allergy shots. Has an EpiPen but the medication has . Requesting a refill.       Current medicines (including changes today)  Current Outpatient Prescriptions   Medication Sig Dispense Refill   • traZODone (DESYREL) 50 MG Tab TAKE 1/2 TO 1 TABLET BY MOUTH ONCE DAILY AT BEDTIME AS NEEDED FOR SLEEP 90 Tab 0   • HYDROcodone-acetaminophen (NORCO) 5-325 MG Tab per tablet Take 1 Tab by mouth every 6 hours as needed for up to 30 days. 30 Tab 0   • EPINEPHrine (EPIPEN) 0.3 MG/0.3ML Solution Auto-injector solution for injection 0.3 mL by Intramuscular route Once for 1 dose. Box of 2 please. 0.3 mL 1   • sertraline (ZOLOFT) 50 MG Tab TAKE 1 AND 1/2 TABLETS BY MOUTH ONCE DAILY 135 Tab 0   • buPROPion (WELLBUTRIN XL) 300 MG XL tablet TAKE 1 TABLET BY MOUTH ONCE DAILY IN THE MORNING 90 Tab 0   • buPROPion (WELLBUTRIN XL) 150 MG XL tablet TAKE 1 TABLET BY MOUTH ONCE DAILY IN THE MORNING 90 Tab 0   • sumatriptan (IMITREX) 50 MG Tab Take 50 mg by mouth Once PRN for Migraine.     • finasteride (PROSCAR) 5 MG Tab TK 1/4 T PO ONCE D  0   • cyclobenzaprine (FLEXERIL) 10 MG Tab Take 1 Tab by mouth 3 times a day as needed. 30 Tab 0   • vitamin D, Ergocalciferol, (DRISDOL) 08685 UNITS Cap capsule Take 50,000 Units by mouth.  11   •  VIAGRA 100 MG tablet TAKE 1 TABLET BY MOUTH 30MIN BEFORE ACTIVITY  2   • azelastine (ASTELIN) 137 MCG/SPRAY nasal spray [place] 2 Sprays in nose 2 Times a Day.  4   • DYMISTA 137-50 MCG/ACT Suspension Spray 2 Sprays in nose every day.  5   • ibuprofen (MOTRIN) 200 MG Tab Take 200 mg by mouth every 6 hours as needed.     • omeprazole (PRILOSEC) 20 MG delayed-release capsule Take 20 mg by mouth every day.     • diclofenac EC (VOLTAREN) 75 MG Tablet Delayed Response Take 1 Tab by mouth 2 times a day. 60 Tab 0   • Diclofenac Epolamine (FLECTOR) 1.3 % PTCH 0.5 Patches by Apply externally route every 12 hours as needed. 5 Each 3     No current facility-administered medications for this visit.      He  has a past medical history of Back pain; Central sleep apnea; Depression; and FLIP (obstructive sleep apnea). He also has no past medical history of ASTHMA; Diabetes; Seizure (CMS-[facility]); or Ulcer (CMS-HCC).    Social History and Family History were reviewed and updated.    ROS   No chest pain, no shortness of breath, no abdominal pain and all other systems were reviewed and are negative.       Objective:     Blood pressure 112/80, pulse 68, temperature 36.5 °C (97.7 °F), height 1.829 m (6'), weight 92.5 kg (204 lb), SpO2 98 %. Body mass index is 27.67 kg/m².   Physical Exam:  Constitutional: Alert, no distress.  Skin: Warm, dry, good turgor, no rashes in visible areas.  Eye: Equal, round and reactive, conjunctiva clear, lids normal.  ENMT: Lips without lesions, good dentition, oropharynx clear.  Neck: Trachea midline, no masses.   Lymph: No cervical or supraclavicular lymphadenopathy  Respiratory: Unlabored respiratory effort, lungs clear to auscultation, no wheezes, no ronchi.  Cardiovascular: Normal S1, S2, no murmur, no edema.  Psych: Alert and oriented x3, normal affect and mood.        Assessment and Plan:   The following treatment plan was discussed    1. Patellofemoral pain syndrome of left knee  Chronic condition.  Controlled substance agreement signed. Provided Norco as directed. Advised to continue exercises as needed.  - Controlled Substance Treatment Agreement  - HYDROcodone-acetaminophen (NORCO) 5-325 MG Tab per tablet; Take 1 Tab by mouth every 6 hours as needed for up to 30 days.  Dispense: 30 Tab; Refill: 0    2. Chronic left-sided low back pain with left-sided sciatica  Chronic condition. Controlled substance agreement signed.  reviewed. Provided Norco to take as directed for acute symptoms.  - Controlled Substance Treatment Agreement  - HYDROcodone-acetaminophen (NORCO) 5-325 MG Tab per tablet; Take 1 Tab by mouth every 6 hours as needed for up to 30 days.  Dispense: 30 Tab; Refill: 0    3. Chronic left shoulder pain  Chronic condition. Follow with orthopedics if needed. Provided medication as directed.  - Controlled Substance Treatment Agreement  - HYDROcodone-acetaminophen (NORCO) 5-325 MG Tab per tablet; Take 1 Tab by mouth every 6 hours as needed for up to 30 days.  Dispense: 30 Tab; Refill: 0    4. Major depressive disorder, recurrent episode, moderate (CMS-HCC)  Chronic condition. Stable. Continue SSRI and Wellbutrin daily. Follow with psychiatry.    5. LAKHWINDER (generalized anxiety disorder)  Chronic condition. Stable. Continue medications as directed.    6. Other insomnia  Chronic condition. Continue trazodone if needed.    7. Chronic migraine  Chronic condition. Continue sumatriptan if needed.    8. Chronic non-seasonal allergic rhinitis, unspecified trigger  Chronic condition. Follow with allergy. Continue medications and allergy shots as directed.    9. Gastroesophageal reflux disease, esophagitis presence not specified  Chronic condition. Stable. Continue PPI as needed.    10. Obstructive sleep apnea  Chronic condition. Stable. Continue CPAP as needed. Follow with pulmonology.    11. Drug-induced erectile dysfunction  Chronic condition. Stable. Continue Viagra as needed. No refill given today.    12.  Allergic reaction, sequela  Prescribed EpiPen.    13. Encounter to establish care      Followup: Return in about 6 months (around 10/24/2018), or if symptoms worsen or fail to improve.    Please note that this dictation was created using voice recognition software. I have made every reasonable attempt to correct obvious errors, but I expect that there are errors of grammar and possibly content that I did not discover before finalizing the note.

## 2018-04-24 NOTE — ASSESSMENT & PLAN NOTE
This is a 43-year-old male who is here today to establish care. 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 His family will stay here. Has a chronic history of left knee pain. Was diagnosed with patellar femoral syndrome. He has gone to PT. No improvement. States he will take Norco as needed for pain. Symptoms of pain occur with increased use of activity.

## 2018-04-24 NOTE — ASSESSMENT & PLAN NOTE
Has chronic allergic rhinitis. Follows with allergy. Currently provided allergy shots. Has watery eyes and itchy throat as well as sinus congestion and drainage. Uses Astelin and other medications. Today his eyes are watery.

## 2018-04-24 NOTE — ASSESSMENT & PLAN NOTE
Also complains of a chronic history of left shoulder pain. Is seen by orthopedics. MRI of his shoulder showed the following:    Impression       1.  Mild increased signal within the bursal surface of the supraspinatus tendon may indicate a mild or shallow bursal surface partial tear. No full-thickness rotator cuff tear.    2.  Downsloping acromion.     Statuses at PT and other workup without any relief. Pain is exacerbated by his trainings. States that he will take Norco and ibuprofen as directed. Also takes diclofenac products.

## 2018-04-24 NOTE — ASSESSMENT & PLAN NOTE
Has allergies to shellfish as well as to possible allergy shots. Has an EpiPen but the medication has . Requesting a refill.

## 2018-04-24 NOTE — ASSESSMENT & PLAN NOTE
Also complains of chronic low back pain with left-sided sciatica. Again symptoms are exacerbated by activities and trainings. He'll take Norco as needed. Also takes a muscle relaxer. In the past has been on both Flexeril and Soma. He states he has seen [facility] in the past. Has been to PT.

## 2018-04-24 NOTE — ASSESSMENT & PLAN NOTE
Has drug-induced erectile dysfunction secondary to the SSRI. Takes Viagra as needed. Does need a refill.

## 2018-05-09 ENCOUNTER — OFFICE VISIT (OUTPATIENT)
Dept: URGENT CARE | Facility: PHYSICIAN GROUP | Age: 43
End: 2018-05-09
Payer: OTHER GOVERNMENT

## 2018-05-09 ENCOUNTER — OFFICE VISIT (OUTPATIENT)
Dept: BEHAVIORAL HEALTH | Facility: PHYSICIAN GROUP | Age: 43
End: 2018-05-09
Payer: OTHER GOVERNMENT

## 2018-05-09 VITALS
OXYGEN SATURATION: 95 % | SYSTOLIC BLOOD PRESSURE: 114 MMHG | TEMPERATURE: 98.1 F | HEART RATE: 64 BPM | BODY MASS INDEX: 27.63 KG/M2 | HEIGHT: 72 IN | WEIGHT: 204 LBS | DIASTOLIC BLOOD PRESSURE: 78 MMHG

## 2018-05-09 DIAGNOSIS — F41.1 GAD (GENERALIZED ANXIETY DISORDER): ICD-10-CM

## 2018-05-09 DIAGNOSIS — J22 ACUTE LOWER RESPIRATORY INFECTION: Primary | ICD-10-CM

## 2018-05-09 DIAGNOSIS — J98.01 BRONCHOSPASM, ACUTE: ICD-10-CM

## 2018-05-09 DIAGNOSIS — R05.9 COUGH: ICD-10-CM

## 2018-05-09 PROCEDURE — 99214 OFFICE O/P EST MOD 30 MIN: CPT | Performed by: PHYSICIAN ASSISTANT

## 2018-05-09 PROCEDURE — 90834 PSYTX W PT 45 MINUTES: CPT | Performed by: PSYCHOLOGIST

## 2018-05-09 RX ORDER — DOXYCYCLINE HYCLATE 100 MG
100 TABLET ORAL 2 TIMES DAILY
Qty: 20 TAB | Refills: 0 | Status: SHIPPED | OUTPATIENT
Start: 2018-05-09 | End: 2018-05-19

## 2018-05-09 RX ORDER — ALBUTEROL SULFATE 90 UG/1
2 AEROSOL, METERED RESPIRATORY (INHALATION) EVERY 4 HOURS PRN
Qty: 1 INHALER | Refills: 0 | Status: SHIPPED | OUTPATIENT
Start: 2018-05-09 | End: 2021-08-30 | Stop reason: SDUPTHER

## 2018-05-09 RX ORDER — CODEINE PHOSPHATE/GUAIFENESIN 10-100MG/5
10 LIQUID (ML) ORAL 4 TIMES DAILY PRN
Qty: 200 ML | Refills: 0 | Status: SHIPPED | OUTPATIENT
Start: 2018-05-09 | End: 2018-05-14

## 2018-05-09 ASSESSMENT — ENCOUNTER SYMPTOMS
SPUTUM PRODUCTION: 1
FEVER: 1
COUGH: 1
RHINORRHEA: 1
SORE THROAT: 0
SHORTNESS OF BREATH: 0
WHEEZING: 1
STRIDOR: 0

## 2018-05-09 NOTE — PROGRESS NOTES
Subjective:      Mike Rios is a 43 y.o. male who presents with Cough (runny nose X 2 weeks )    PMH:  has a past medical history of Back pain; Central sleep apnea; Depression; and FLIP (obstructive sleep apnea). He also has no past medical history of ASTHMA; Diabetes; Seizure (HCC); or Ulcer.  MEDS:   Current Outpatient Prescriptions:   •  doxycycline (VIBRAMYCIN) 100 MG Tab, Take 1 Tab by mouth 2 times a day for 10 days., Disp: 20 Tab, Rfl: 0  •  guaifenesin-codeine (TUSSI-ORGANIDIN NR) 100-10 MG/5ML syrup, Take 10 mL by mouth 4 times a day as needed for up to 5 days., Disp: 200 mL, Rfl: 0  •  albuterol 108 (90 Base) MCG/ACT Aero Soln inhalation aerosol, Inhale 2 Puffs by mouth every four hours as needed., Disp: 1 Inhaler, Rfl: 0  •  traZODone (DESYREL) 50 MG Tab, TAKE 1/2 TO 1 TABLET BY MOUTH ONCE DAILY AT BEDTIME AS NEEDED FOR SLEEP, Disp: 90 Tab, Rfl: 0  •  HYDROcodone-acetaminophen (NORCO) 5-325 MG Tab per tablet, Take 1 Tab by mouth every 6 hours as needed for up to 30 days., Disp: 30 Tab, Rfl: 0  •  sertraline (ZOLOFT) 50 MG Tab, TAKE 1 AND 1/2 TABLETS BY MOUTH ONCE DAILY, Disp: 135 Tab, Rfl: 0  •  buPROPion (WELLBUTRIN XL) 300 MG XL tablet, TAKE 1 TABLET BY MOUTH ONCE DAILY IN THE MORNING, Disp: 90 Tab, Rfl: 0  •  buPROPion (WELLBUTRIN XL) 150 MG XL tablet, TAKE 1 TABLET BY MOUTH ONCE DAILY IN THE MORNING, Disp: 90 Tab, Rfl: 0  •  sumatriptan (IMITREX) 50 MG Tab, Take 50 mg by mouth Once PRN for Migraine., Disp: , Rfl:   •  finasteride (PROSCAR) 5 MG Tab, TK 1/4 T PO ONCE D, Disp: , Rfl: 0  •  cyclobenzaprine (FLEXERIL) 10 MG Tab, Take 1 Tab by mouth 3 times a day as needed., Disp: 30 Tab, Rfl: 0  •  vitamin D, Ergocalciferol, (DRISDOL) 89584 UNITS Cap capsule, Take 50,000 Units by mouth., Disp: , Rfl: 11  •  VIAGRA 100 MG tablet, TAKE 1 TABLET BY MOUTH 30MIN BEFORE ACTIVITY, Disp: , Rfl: 2  •  azelastine (ASTELIN) 137 MCG/SPRAY nasal spray, Spray 2 Sprays in nose 2 Times a Day., Disp: , Rfl: 4  •   DYMISTA 137-50 MCG/ACT Suspension, Spray 2 Sprays in nose every day., Disp: , Rfl: 5  •  ibuprofen (MOTRIN) 200 MG Tab, Take 200 mg by mouth every 6 hours as needed., Disp: , Rfl:   •  omeprazole (PRILOSEC) 20 MG delayed-release capsule, Take 20 mg by mouth every day., Disp: , Rfl:   •  diclofenac EC (VOLTAREN) 75 MG Tablet Delayed Response, Take 1 Tab by mouth 2 times a day., Disp: 60 Tab, Rfl: 0  •  Diclofenac Epolamine (FLECTOR) 1.3 % PTCH, 0.5 Patches by Apply externally route every 12 hours as needed., Disp: 5 Each, Rfl: 3  ALLERGIES:   Allergies   Allergen Reactions   • Pcn [Penicillins]      SURGHX:   Past Surgical History:   Procedure Laterality Date   • HERNIA REPAIR       SOCHX:  reports that he has never smoked. He has never used smokeless tobacco. He reports that he does not drink alcohol or use drugs.  FH: family history includes Heart Disease in his father; Hyperlipidemia in his father; Hypertension in his father. Reviewed with patient/family. Not pertinent to this complaint.            PT co persistent productive cough x 2 weeks. PT states symptoms began like a cold does, runny nose, itchy throat, sneezing etc.  But the majority of the runny nose/congestion symptoms have resolved but the cough is worse, now wheezing and a fever on and off a few days ago, none today.  PT has tried otc cough cold medicine without resolution.  PT does have seasonal allergies, on shots and Astelin, this feels different.       Cough   This is a new problem. Episode onset: 2 weeks. The problem has been gradually worsening. The problem occurs every few minutes. The cough is productive of sputum. Associated symptoms include a fever (past few days), nasal congestion, postnasal drip, rhinorrhea and wheezing. Pertinent negatives include no sore throat or shortness of breath. The symptoms are aggravated by lying down (exertion, talking). He has tried OTC cough suppressant and rest for the symptoms. His past medical history is  significant for environmental allergies.       Review of Systems   Constitutional: Positive for fever (past few days).   HENT: Positive for congestion, postnasal drip and rhinorrhea. Negative for sore throat.    Respiratory: Positive for cough, sputum production and wheezing. Negative for shortness of breath and stridor.    Endo/Heme/Allergies: Positive for environmental allergies.   All other systems reviewed and are negative.         Objective:     /78   Pulse 64   Temp 36.7 °C (98.1 °F)   Ht 1.829 m (6')   Wt 92.5 kg (204 lb)   SpO2 95%   BMI 27.67 kg/m²      Physical Exam   Constitutional: He is oriented to person, place, and time. He appears well-developed and well-nourished. No distress.   HENT:   Head: Normocephalic and atraumatic.   Right Ear: Tympanic membrane normal.   Left Ear: Tympanic membrane normal.   Nose: Mucosal edema and rhinorrhea present.   Mouth/Throat: Uvula is midline and mucous membranes are normal. Posterior oropharyngeal erythema present. No posterior oropharyngeal edema. No tonsillar exudate.   Eyes: Conjunctivae and EOM are normal. Pupils are equal, round, and reactive to light.   Neck: Normal range of motion. Neck supple.   Cardiovascular: Normal rate, regular rhythm and normal heart sounds.    Pulmonary/Chest: Effort normal. He has no decreased breath sounds. He has wheezes. He has rhonchi. He has no rales.   Abdominal: Soft.   Musculoskeletal: Normal range of motion.   Lymphadenopathy:     He has no cervical adenopathy.   Neurological: He is alert and oriented to person, place, and time. Gait normal.   Skin: Skin is warm and dry. Capillary refill takes less than 2 seconds.   Psychiatric: He has a normal mood and affect.   Nursing note and vitals reviewed.       Assessment/Plan:     1. Acute lower respiratory infection  doxycycline (VIBRAMYCIN) 100 MG Tab    guaifenesin-codeine (TUSSI-ORGANIDIN NR) 100-10 MG/5ML syrup    albuterol 108 (90 Base) MCG/ACT Aero Soln inhalation  aerosol   2. Bronchospasm, acute  doxycycline (VIBRAMYCIN) 100 MG Tab    guaifenesin-codeine (TUSSI-ORGANIDIN NR) 100-10 MG/5ML syrup    albuterol 108 (90 Base) MCG/ACT Aero Soln inhalation aerosol   3. Cough  doxycycline (VIBRAMYCIN) 100 MG Tab    guaifenesin-codeine (TUSSI-ORGANIDIN NR) 100-10 MG/5ML syrup    albuterol 108 (90 Base) MCG/ACT Aero Soln inhalation aerosol     PT can continue OTC medications, increase fluids and rest until symptoms improve.     PT instructed not to drive or operate heavy machinery or drink alcohol while taking this medication because it contains either narcotic/benzodiazepines and causes drowsiness. PT verbalized understanding of these instructions.     [facility] Aware web site evaluation: I have obtained and reviewed patient utilization report from [facility] of [facility] pharmacy database prior to writing prescription for controlled substance.  No history of abuse.      PT should follow up with PCP in 1-2 days for re-evaluation if symptoms have not improved.  Discussed red flags and reasons to return to UC or ED.  Pt and/or family verbalized understanding of diagnosis and follow up instructions and was offered informational handout on diagnosis.  PT discharged.

## 2018-05-09 NOTE — BH THERAPY
[facility]  Therapy Progress Note    Patient Name: Mike Rios  Patient MRN: [id number]  Today's Date: 5/9/2017     Type of session:Individual psychotherapy  Length of session: 45  Persons in attendance:Patient    Subjective/New Info: Pt still feels very stressed by nature of his job. Pt will be transferred to [place] and will come home 1 x month to see family. Disc Pt problem of unaccepting his anxiety. Disc applying Mindfulness/ACT coping strategies - this is difficult for pt as he struggles to not get overly involved with his fears. Focused on DBT acting effectively instead of becoming overly focused on fair vs unfair. Pt states that he is done with school and working to secure a position in  after he gets out. Bought a home in [place]. Kids received early psych intervention and both are in therapy - daughter struggles with anxiety and son dx with high functioning autism.     Objective/Observations:   Participation: Active verbal participation, Attentive, Engaged and Open to feedback   Grooming: Good   Cognition: Alert and Fully Oriented   Eye contact: Good   Mood: Anxious   Affect: Constricted   Thought process: Logical and Goal-directed   Speech: Rate within normal limits   Other:     Diagnoses:   1. LAKHWINDER (generalized anxiety disorder)         Current risk:   SUICIDE: Low   Homicide: Not applicable   Self-harm: Not applicable   Relapse: Not applicable   Other:    Safety Plan reviewed? Not Indicated   If evidence of imminent risk is present, intervention/plan:     Therapeutic Intervention(s): Behavior:  Behavioral activation, Conflict clarification, Distress tolerance skills, Psychoeducation RE: ACT, Self-care skills, Stressors assessed and Supportive psychotherapy    Treatment Goal(s)/Objective(s) addressed: Tx plan:  - Utilize learned skills to manage mood and emotional suffering more effectively  - Identify goals/values and cultivate a meaningful life  - Learn to be more  emotionally flexible/resilient in times of stress/challenges  - Eliminate SI/self harming behaviors & increase sense of hope/optimisim        Progress toward Treatment Goals: Moderate improvement    Plan:  - Continue Individual therapy and Medication management    June Canela, Ph.D.  5/9/2017

## 2018-06-07 DIAGNOSIS — G47.33 OBSTRUCTIVE SLEEP APNEA: ICD-10-CM

## 2018-06-07 DIAGNOSIS — N52.2 DRUG-INDUCED ERECTILE DYSFUNCTION: ICD-10-CM

## 2018-06-07 RX ORDER — SILDENAFIL CITRATE 100 MG
100 TABLET ORAL
Qty: 9 TAB | Refills: 11 | Status: SHIPPED | OUTPATIENT
Start: 2018-06-07 | End: 2018-06-07 | Stop reason: SDUPTHER

## 2018-06-07 RX ORDER — SILDENAFIL CITRATE 100 MG
100 TABLET ORAL
Qty: 9 TAB | Refills: 11 | Status: SHIPPED
Start: 2018-06-07 | End: 2018-12-13 | Stop reason: SDUPTHER

## 2018-06-07 RX ORDER — SILDENAFIL CITRATE 100 MG
100 TABLET ORAL
Qty: 9 TAB | Refills: 11 | Status: SHIPPED
Start: 2018-06-07 | End: 2018-06-07 | Stop reason: SDUPTHER

## 2018-06-18 ENCOUNTER — SLEEP CENTER VISIT (OUTPATIENT)
Dept: SLEEP MEDICINE | Facility: MEDICAL CENTER | Age: 43
End: 2018-06-18
Payer: OTHER GOVERNMENT

## 2018-06-18 VITALS
RESPIRATION RATE: 16 BRPM | OXYGEN SATURATION: 98 % | DIASTOLIC BLOOD PRESSURE: 78 MMHG | HEIGHT: 72 IN | SYSTOLIC BLOOD PRESSURE: 118 MMHG | HEART RATE: 60 BPM

## 2018-06-18 DIAGNOSIS — K21.9 GASTROESOPHAGEAL REFLUX DISEASE, ESOPHAGITIS PRESENCE NOT SPECIFIED: ICD-10-CM

## 2018-06-18 DIAGNOSIS — G47.33 OBSTRUCTIVE SLEEP APNEA: ICD-10-CM

## 2018-06-18 PROCEDURE — 99213 OFFICE O/P EST LOW 20 MIN: CPT | Performed by: NURSE PRACTITIONER

## 2018-06-18 RX ORDER — EPINEPHRINE 0.3 MG/.3ML
INJECTION SUBCUTANEOUS
COMMUNITY
Start: 2018-06-17 | End: 2018-12-27 | Stop reason: SDUPTHER

## 2018-06-18 RX ORDER — DOXYCYCLINE HYCLATE 100 MG/1
CAPSULE ORAL
Refills: 0 | COMMUNITY
Start: 2018-05-09 | End: 2018-12-27

## 2018-06-18 RX ORDER — OMEPRAZOLE 20 MG/1
CAPSULE, DELAYED RELEASE ORAL
Qty: 30 CAP | Refills: 5 | Status: SHIPPED | OUTPATIENT
Start: 2018-06-18 | End: 2018-12-20 | Stop reason: SDUPTHER

## 2018-06-18 NOTE — PROGRESS NOTES
Chief Complaint   Patient presents with   • Annual Exam       HPI:  Mike Rios is a 43 y.o. year old male here today for annual follow-up on FLIP. PSG indicated [age] of [age] with a minimum oxygen saturation 84%. He had several central sleep apneas. Has a family history of MI and untreated sleep apnea. He is currently using CPAP 10cm h20 nightly. Previous compliance download indicated 73% compliance with average at least a 4.5 hours in an AHI of 0.4. Compliance download today 5/18/2018 through 6/16/2018 only indicates 4 days of usage of 4 hours and 13 minutes average, and overall AHI 0.1. Patient states this is not accurate. He's been using it nightly except for during his periods of travel which only happen every 2 months. 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 He did not bring his machine with him this week. He feels well rested on therapy and tolerates mask and pressure well. He denies morning headaches. Daytime energy is consistent. He would benefit from a trial CPAP. He denies any major changes in health over the last year. He denies cardiac or respiratory symptoms.    ROS: As per HPI and otherwise negative if not stated.    Past Medical History:   Diagnosis Date   • Back pain    • Central sleep apnea    • Depression    • FLIP (obstructive sleep apnea)        Past Surgical History:   Procedure Laterality Date   • HERNIA REPAIR         Family History   Problem Relation Age of Onset   • Heart Attack     • Hypertension Father    • Heart Disease Father    • Hyperlipidemia Father    • Psychiatry Neg Hx        Social History     Social History   • Marital status:      Spouse name: N/A   • Number of children: N/A   • Years of education: N/A     Occupational History   • Not on file.     Social History Main Topics   • Smoking status: Never Smoker   • Smokeless tobacco: Never Used   • Alcohol use 0.0 oz/week      Comment: RARE   • Drug use: No      Comment: denies   • Sexual  activity: Yes     Partners: Female     Other Topics Concern   • Not on file     Social History Narrative   • No narrative on file       Allergies as of 06/18/2018 - Reviewed 06/18/2018   Allergen Reaction Noted   • Pcn [penicillins]  08/04/2009        @Vital signs for this encounter:  Vitals:    06/18/18 0819   Height: 1.829 m (6')   BP: 118/78   Pulse: 60   Resp: 16   O2 sat % room air: 98 %       Current medications as of today   Current Outpatient Prescriptions   Medication Sig Dispense Refill   • doxycycline (VIBRAMYCIN) 100 MG Cap TK ONE C PO  BID FOR 10 DAYS  0   • EPINEPHrine (EPIPEN) 0.3 MG/0.3ML Solution Auto-injector solution for injection      • VIAGRA 100 MG tablet Take 1 Tab by mouth 1 time daily as needed for Erectile Dysfunction. 1/2 hour prior to activity. 9 Tab 11   • albuterol 108 (90 Base) MCG/ACT Aero Soln inhalation aerosol Inhale 2 Puffs by mouth every four hours as needed. 1 Inhaler 0   • traZODone (DESYREL) 50 MG Tab TAKE 1/2 TO 1 TABLET BY MOUTH ONCE DAILY AT BEDTIME AS NEEDED FOR SLEEP 90 Tab 0   • sertraline (ZOLOFT) 50 MG Tab TAKE 1 AND 1/2 TABLETS BY MOUTH ONCE DAILY 135 Tab 0   • buPROPion (WELLBUTRIN XL) 300 MG XL tablet TAKE 1 TABLET BY MOUTH ONCE DAILY IN THE MORNING 90 Tab 0   • buPROPion (WELLBUTRIN XL) 150 MG XL tablet TAKE 1 TABLET BY MOUTH ONCE DAILY IN THE MORNING 90 Tab 0   • sumatriptan (IMITREX) 50 MG Tab Take 50 mg by mouth Once PRN for Migraine.     • finasteride (PROSCAR) 5 MG Tab TK 1/4 T PO ONCE D  0   • cyclobenzaprine (FLEXERIL) 10 MG Tab Take 1 Tab by mouth 3 times a day as needed. 30 Tab 0   • vitamin D, Ergocalciferol, (DRISDOL) 21094 UNITS Cap capsule Take 50,000 Units by mouth.  11   • azelastine (ASTELIN) 137 MCG/SPRAY nasal spray [place] 2 Sprays in nose 2 Times a Day.  4   • DYMISTA 137-50 MCG/ACT Suspension Spray 2 Sprays in nose every day.  5   • diclofenac EC (VOLTAREN) 75 MG Tablet Delayed Response Take 1 Tab by mouth 2 times a day. 60 Tab 0   • Diclofenac  Epolamine (FLECTOR) 1.3 % PTCH 0.5 Patches by Apply externally route every 12 hours as needed. 5 Each 3   • omeprazole (PRILOSEC) 20 MG delayed-release capsule TAKE 1 CAPSULE BY MOUTH ONCE DAILY 30 Cap 5   • ibuprofen (MOTRIN) 200 MG Tab Take 200 mg by mouth every 6 hours as needed.       No current facility-administered medications for this visit.          Physical Exam:   Gen:           Alert and oriented, No apparent distress. Mood and affect appropriate, normal interaction with examiner.  Eyes:          PERRL, EOM intact, sclere white, conjunctive moist.  Ears:          Not examined.   Hearing:     Grossly intact.  Nose:          Normal, no lesions or deformities.  Dentition:    Good dentition.  Oropharynx:   Tongue normal, posterior pharynx without erythema or exudate.  Mallampati Classification: not examined.  Neck:        Supple, trachea midline, no masses.  Respiratory Effort: No intercostal retractions or use of accessory muscles.   Lung Auscultation:      Clear to auscultation bilaterally; no rales, rhonchi or wheezing.  CV:            Regular rate and rhythm. No murmurs, rubs or gallops.  Abd:           Not examined.   Lymphadenopathy: Not examined.  Gait and Station: Normal.  Digits and Nails: No clubbing, cyanosis, petechiae, or nodes.   Cranial Nerves: II-XII grossly intact.  Skin:        No rashes, lesions or ulcers noted.               Ext:           No cyanosis or edema.      Assessment:  1. Obstructive sleep apnea  DME MASK AND SUPPLIES    DME OTHER    DME OTHER   2. Gastroesophageal reflux disease, esophagitis presence not specified         Immunizations:    Flu:9/2017  Pneumovax 23: not given  Prevnar 13:not given    Plan:  1. Continue CPAP nightly.   DME mask/supplies.   DME travel cpap.   DME verify function of machine is recording usage time. Report does not reflect patient's usage.  2. Discussed sleep hygiene.  3. Follow up in 1 year with compliance card, sooner if needed. (Can be accessed  wirelessly).

## 2018-06-21 RX ORDER — FINASTERIDE 5 MG/1
TABLET, FILM COATED ORAL
Qty: 30 TAB | Refills: 5 | Status: SHIPPED | OUTPATIENT
Start: 2018-06-21 | End: 2018-12-27 | Stop reason: SDUPTHER

## 2018-06-21 NOTE — TELEPHONE ENCOUNTER
Was the patient seen in the last year in this department? Yes     Does patient have an active prescription for medications requested? No     Received Request Via: Pharmacy     Pharmacy sent second refill request, requesting a 90 day supply.

## 2018-07-23 RX ORDER — TRAZODONE HYDROCHLORIDE 50 MG/1
TABLET ORAL
Qty: 90 TAB | Refills: 0 | Status: SHIPPED | OUTPATIENT
Start: 2018-07-23 | End: 2019-03-18 | Stop reason: SDUPTHER

## 2018-07-26 ENCOUNTER — APPOINTMENT (OUTPATIENT)
Dept: MEDICAL GROUP | Facility: MEDICAL CENTER | Age: 43
End: 2018-07-26
Payer: OTHER GOVERNMENT

## 2018-08-22 DIAGNOSIS — R79.89 LOW VITAMIN D LEVEL: ICD-10-CM

## 2018-08-22 RX ORDER — SUMATRIPTAN 50 MG/1
50 TABLET, FILM COATED ORAL
Qty: 10 TAB | Refills: 5 | Status: SHIPPED | OUTPATIENT
Start: 2018-08-22 | End: 2018-12-27 | Stop reason: SDUPTHER

## 2018-09-10 RX ORDER — BUPROPION HYDROCHLORIDE 300 MG/1
TABLET ORAL
Qty: 30 TAB | Refills: 0 | Status: SHIPPED | OUTPATIENT
Start: 2018-09-10 | End: 2018-10-08 | Stop reason: SDUPTHER

## 2018-09-10 RX ORDER — TRAZODONE HYDROCHLORIDE 50 MG/1
TABLET ORAL
Qty: 30 TAB | Refills: 0 | Status: SHIPPED | OUTPATIENT
Start: 2018-09-10 | End: 2018-11-21 | Stop reason: SDUPTHER

## 2018-10-08 RX ORDER — BUPROPION HYDROCHLORIDE 300 MG/1
TABLET ORAL
Qty: 30 TAB | Refills: 2 | Status: SHIPPED | OUTPATIENT
Start: 2018-10-08 | End: 2018-12-27 | Stop reason: SDUPTHER

## 2018-11-21 RX ORDER — TRAZODONE HYDROCHLORIDE 50 MG/1
TABLET ORAL
Qty: 30 TAB | Refills: 0 | Status: SHIPPED | OUTPATIENT
Start: 2018-11-21 | End: 2018-12-27

## 2018-12-13 DIAGNOSIS — N52.2 DRUG-INDUCED ERECTILE DYSFUNCTION: ICD-10-CM

## 2018-12-14 RX ORDER — SILDENAFIL 100 MG/1
TABLET, FILM COATED ORAL
Qty: 9 TAB | Refills: 5 | Status: SHIPPED | OUTPATIENT
Start: 2018-12-14 | End: 2018-12-27

## 2018-12-27 ENCOUNTER — OFFICE VISIT (OUTPATIENT)
Dept: MEDICAL GROUP | Facility: MEDICAL CENTER | Age: 43
End: 2018-12-27
Payer: OTHER GOVERNMENT

## 2018-12-27 VITALS
RESPIRATION RATE: 14 BRPM | TEMPERATURE: 97.2 F | WEIGHT: 208.34 LBS | SYSTOLIC BLOOD PRESSURE: 120 MMHG | HEART RATE: 80 BPM | OXYGEN SATURATION: 93 % | HEIGHT: 72 IN | BODY MASS INDEX: 28.22 KG/M2 | DIASTOLIC BLOOD PRESSURE: 80 MMHG

## 2018-12-27 DIAGNOSIS — N52.2 DRUG-INDUCED ERECTILE DYSFUNCTION: ICD-10-CM

## 2018-12-27 DIAGNOSIS — F41.1 GAD (GENERALIZED ANXIETY DISORDER): ICD-10-CM

## 2018-12-27 DIAGNOSIS — L65.9 HAIR LOSS: ICD-10-CM

## 2018-12-27 DIAGNOSIS — M54.42 CHRONIC LEFT-SIDED LOW BACK PAIN WITH LEFT-SIDED SCIATICA: ICD-10-CM

## 2018-12-27 DIAGNOSIS — G89.29 CHRONIC LEFT-SIDED LOW BACK PAIN WITH LEFT-SIDED SCIATICA: ICD-10-CM

## 2018-12-27 DIAGNOSIS — F33.1 MAJOR DEPRESSIVE DISORDER, RECURRENT EPISODE, MODERATE (HCC): ICD-10-CM

## 2018-12-27 DIAGNOSIS — T78.40XS ALLERGIC REACTION, SEQUELA: ICD-10-CM

## 2018-12-27 DIAGNOSIS — K21.9 GASTROESOPHAGEAL REFLUX DISEASE, ESOPHAGITIS PRESENCE NOT SPECIFIED: ICD-10-CM

## 2018-12-27 PROCEDURE — 99214 OFFICE O/P EST MOD 30 MIN: CPT | Performed by: PHYSICIAN ASSISTANT

## 2018-12-27 RX ORDER — SUMATRIPTAN 50 MG/1
50 TABLET, FILM COATED ORAL
Qty: 9 TAB | Refills: 11 | Status: SHIPPED | OUTPATIENT
Start: 2018-12-27 | End: 2019-12-19

## 2018-12-27 RX ORDER — OMEPRAZOLE 20 MG/1
20 CAPSULE, DELAYED RELEASE ORAL DAILY
Qty: 90 CAP | Refills: 3 | Status: SHIPPED | OUTPATIENT
Start: 2018-12-27 | End: 2020-05-03

## 2018-12-27 RX ORDER — FINASTERIDE 5 MG/1
TABLET, FILM COATED ORAL
Qty: 30 TAB | Refills: 11 | Status: SHIPPED | OUTPATIENT
Start: 2018-12-27 | End: 2020-05-03

## 2018-12-27 RX ORDER — BUPROPION HYDROCHLORIDE 300 MG/1
300 TABLET ORAL EVERY MORNING
Qty: 90 TAB | Refills: 3 | Status: SHIPPED | OUTPATIENT
Start: 2018-12-27 | End: 2019-12-11 | Stop reason: SDUPTHER

## 2018-12-27 RX ORDER — EPINEPHRINE 0.3 MG/.3ML
0.3 INJECTION SUBCUTANEOUS ONCE
Qty: 1 EACH | Refills: 1 | Status: SHIPPED | OUTPATIENT
Start: 2018-12-27 | End: 2018-12-27

## 2018-12-27 RX ORDER — SILDENAFIL CITRATE 20 MG/1
20 TABLET ORAL
Qty: 90 TAB | Refills: 3 | Status: SHIPPED
Start: 2018-12-27 | End: 2019-01-26

## 2018-12-27 ASSESSMENT — PATIENT HEALTH QUESTIONNAIRE - PHQ9
7. TROUBLE CONCENTRATING ON THINGS, SUCH AS READING THE NEWSPAPER OR WATCHING TELEVISION: NOT AT ALL
9. THOUGHTS THAT YOU WOULD BE BETTER OFF DEAD, OR OF HURTING YOURSELF: NOT AT ALL
SUM OF ALL RESPONSES TO PHQ9 QUESTIONS 1 AND 2: 0
6. FEELING BAD ABOUT YOURSELF - OR THAT YOU ARE A FAILURE OR HAVE LET YOURSELF OR YOUR FAMILY DOWN: NOT AL ALL
3. TROUBLE FALLING OR STAYING ASLEEP OR SLEEPING TOO MUCH: NOT AT ALL
4. FEELING TIRED OR HAVING LITTLE ENERGY: NOT AT ALL
2. FEELING DOWN, DEPRESSED, IRRITABLE, OR HOPELESS: NOT AT ALL
5. POOR APPETITE OR OVEREATING: NOT AT ALL
8. MOVING OR SPEAKING SO SLOWLY THAT OTHER PEOPLE COULD HAVE NOTICED. OR THE OPPOSITE, BEING SO FIGETY OR RESTLESS THAT YOU HAVE BEEN MOVING AROUND A LOT MORE THAN USUAL: NOT AT ALL
SUM OF ALL RESPONSES TO PHQ QUESTIONS 1-9: 0
1. LITTLE INTEREST OR PLEASURE IN DOING THINGS: NOT AT ALL

## 2018-12-27 NOTE — ASSESSMENT & PLAN NOTE
Has chronic depression with anxiety.  Takes Wellbutrin 300 mg daily as well as Zoloft 75 mg daily.  Requesting a refill of his medications.  His symptoms are stable.  Denies any exacerbation of depression or anxiety.  No homicidal or suicidal ideations.

## 2018-12-27 NOTE — ASSESSMENT & PLAN NOTE
Has a chronic history of erectile dysfunction.  Uses generic Viagra currently.  States that 50 mg is effective.  Requesting a refill.

## 2018-12-27 NOTE — PROGRESS NOTES
Subjective:   Mike Rios is a 43 y.o. male here today for allergic reaction, ED, migraines, depression and anxiety and chronic back pain and other joints.    Allergic reaction  This is a 43-year-old male who is here today to discuss several of his chronic medical conditions.  Requesting a refill for EpiPen.  Usually will get one box.  Has allergies to shellfish.  Has not had to use one in the recent memory.    Drug-induced erectile dysfunction  Has a chronic history of erectile dysfunction.  Uses generic Viagra currently.  States that 50 mg is effective.  Requesting a refill.    Chronic migraine  Has chronic migraines and uses Imitrex weekly.  Medication is helpful.  Requesting a renewal.    Major depressive disorder, recurrent episode, moderate (CMS-HCC)  Has chronic depression with anxiety.  Takes Wellbutrin 300 mg daily as well as Zoloft 75 mg daily.  Requesting a refill of his medications.  His symptoms are stable.  Denies any exacerbation of depression or anxiety.  No homicidal or suicidal ideations.    Chronic left-sided low back pain with left-sided sciatica  Still complains of chronic low back pain as well as left knee pain and shoulder pain on the left.  Symptoms are stable.  Does not require a refill of hydrocodone or his muscle relaxers.  Still has some medication left.  Is planning and hopefully planning to retire at the end of the year.  Is currently stationed in [place].  Comes home every few months.       Current medicines (including changes today)  Current Outpatient Prescriptions   Medication Sig Dispense Refill   • EPINEPHrine (EPIPEN) 0.3 MG/0.3ML Solution Auto-injector solution for injection 0.3 mL by Intramuscular route Once for 1 dose. Dispense one box. 1 Each 1   • sertraline (ZOLOFT) 50 MG Tab TAKE 1 AND 1/2 TABLETS BY MOUTH ONCE DAILY 135 Tab 3   • SUMAtriptan (IMITREX) 50 MG Tab Take 1 Tab by mouth Once PRN for Migraine. May take second tablet 2 hours after if still with headache.  9 Tab 11   • finasteride (PROSCAR) 5 MG Tab TK 1/4 T PO ONCE D 30 Tab 11   • sildenafil (REVATIO) 20 MG tablet Take 1 Tab by mouth 1 time daily as needed for up to 30 days. 1/2 hour prior to activity. 90 Tab 3   • omeprazole (PRILOSEC) 20 MG delayed-release capsule Take 1 Cap by mouth every day. 1/2 hour prior to same meal. 90 Cap 3   • buPROPion (WELLBUTRIN XL) 300 MG XL tablet Take 1 Tab by mouth every morning. 90 Tab 3   • traZODone (DESYREL) 50 MG Tab TAKE 1/2 TO ONE TABLET BY MOUTH ONCE DAILY AT BEDTIME AS NEEDED FOR SLEEP 90 Tab 0   • cyclobenzaprine (FLEXERIL) 10 MG Tab Take 1 Tab by mouth 3 times a day as needed. 30 Tab 0   • vitamin D, Ergocalciferol, (DRISDOL) 23285 UNITS Cap capsule Take 50,000 Units by mouth.  11   • azelastine (ASTELIN) 137 MCG/SPRAY nasal spray [place] 2 Sprays in nose 2 Times a Day.  4   • DYMISTA 137-50 MCG/ACT Suspension Spray 2 Sprays in nose every day.  5   • ibuprofen (MOTRIN) 200 MG Tab Take 200 mg by mouth every 6 hours as needed.     • diclofenac EC (VOLTAREN) 75 MG Tablet Delayed Response Take 1 Tab by mouth 2 times a day. 60 Tab 0   • albuterol 108 (90 Base) MCG/ACT Aero Soln inhalation aerosol Inhale 2 Puffs by mouth every four hours as needed. 1 Inhaler 0     No current facility-administered medications for this visit.      He  has a past medical history of Back pain; Central sleep apnea; Depression; and FLIP (obstructive sleep apnea). He also has no past medical history of ASTHMA; Diabetes; Seizure (HCC); or Ulcer.    Social History and Family History were reviewed and updated.    ROS   No chest pain, no shortness of breath, no abdominal pain and all other systems were reviewed and are negative.       Objective:     Blood pressure 120/80, pulse 80, temperature 36.2 °C (97.2 °F), temperature source Temporal, resp. rate 14, height 1.829 m (6'), weight 94.5 kg (208 lb 5.4 oz), SpO2 93 %. Body mass index is 28.26 kg/m².   Physical Exam:  Constitutional: Alert, no  distress.  Skin: Warm, dry, good turgor, no rashes in visible areas.  Eye: Equal, round and reactive, conjunctiva clear, lids normal.  ENMT: Lips without lesions, good dentition, oropharynx clear.  Neck: Trachea midline, no masses.   Lymph: No cervical or supraclavicular lymphadenopathy  Respiratory: Unlabored respiratory effort, lungs clear to auscultation, no wheezes, no ronchi.  Cardiovascular: Normal S1, S2, no murmur, no edema.  Abdomen: Soft, non-tender, no masses.  Psych: Alert and oriented x3, normal affect and mood.        Assessment and Plan:   The following treatment plan was discussed    1. Allergic reaction, sequela  Chronic status.  No recent exacerbation.  Renewed EpiPen as directed.  He declined generic EpiPen.  - EPINEPHrine (EPIPEN) 0.3 MG/0.3ML Solution Auto-injector solution for injection; 0.3 mL by Intramuscular route Once for 1 dose. Dispense one box.  Dispense: 1 Each; Refill: 1    2. Major depressive disorder, recurrent episode, moderate (HCC)  Chronic condition.  Stable.  Renewed Zoloft and Wellbutrin as directed.  - sertraline (ZOLOFT) 50 MG Tab; TAKE 1 AND 1/2 TABLETS BY MOUTH ONCE DAILY  Dispense: 135 Tab; Refill: 3  - buPROPion (WELLBUTRIN XL) 300 MG XL tablet; Take 1 Tab by mouth every morning.  Dispense: 90 Tab; Refill: 3    3. LAKHWINDER (generalized anxiety disorder)  Chronic condition.  Stable.  Renewed Zoloft as directed.  - sertraline (ZOLOFT) 50 MG Tab; TAKE 1 AND 1/2 TABLETS BY MOUTH ONCE DAILY  Dispense: 135 Tab; Refill: 3    4. Chronic migraine  Chronic condition.  Stable.  Renewed Imitrex as directed.  - SUMAtriptan (IMITREX) 50 MG Tab; Take 1 Tab by mouth Once PRN for Migraine. May take second tablet 2 hours after if still with headache.  Dispense: 9 Tab; Refill: 11    5. Hair loss  Chronic condition.  Renewed finasteride as directed.  - finasteride (PROSCAR) 5 MG Tab; TK 1/4 T PO ONCE D  Dispense: 30 Tab; Refill: 11    6. Drug-induced erectile dysfunction  Chronic condition.   Stable.  Renew sildenafil as directed.  Discussed medication use.  - sildenafil (REVATIO) 20 MG tablet; Take 1 Tab by mouth 1 time daily as needed for up to 30 days. 1/2 hour prior to activity.  Dispense: 90 Tab; Refill: 3    7. Gastroesophageal reflux disease, esophagitis presence not specified  Chronic condition.  Stable.  Renewed Prilosec as directed.  - omeprazole (PRILOSEC) 20 MG delayed-release capsule; Take 1 Cap by mouth every day. 1/2 hour prior to same meal.  Dispense: 90 Cap; Refill: 3    8. Chronic left-sided low back pain with left-sided sciatica  Chronic condition.  Stable.   reviewed.  No medication provided today.  Advised him at next request he will need to come into the office and  the prescription from the .      Followup: Return in about 1 year (around 12/27/2019), or if symptoms worsen or fail to improve.    Please note that this dictation was created using voice recognition software. I have made every reasonable attempt to correct obvious errors, but I expect that there are errors of grammar and possibly content that I did not discover before finalizing the note.

## 2018-12-27 NOTE — ASSESSMENT & PLAN NOTE
This is a 43-year-old male who is here today to discuss several of his chronic medical conditions.  Requesting a refill for EpiPen.  Usually will get one box.  Has allergies to shellfish.  Has not had to use one in the recent memory.

## 2018-12-27 NOTE — ASSESSMENT & PLAN NOTE
Still complains of chronic low back pain as well as left knee pain and shoulder pain on the left.  Symptoms are stable.  Does not require a refill of hydrocodone or his muscle relaxers.  Still has some medication left.  Is planning and hopefully planning to retire at the end of the year.  Is currently stationed in [place].  Comes home every few months.

## 2019-03-18 ENCOUNTER — OFFICE VISIT (OUTPATIENT)
Dept: MEDICAL GROUP | Facility: MEDICAL CENTER | Age: 44
End: 2019-03-18
Payer: OTHER GOVERNMENT

## 2019-03-18 VITALS
DIASTOLIC BLOOD PRESSURE: 78 MMHG | HEIGHT: 72 IN | WEIGHT: 205.91 LBS | OXYGEN SATURATION: 98 % | HEART RATE: 66 BPM | SYSTOLIC BLOOD PRESSURE: 108 MMHG | TEMPERATURE: 97.2 F | BODY MASS INDEX: 27.89 KG/M2

## 2019-03-18 DIAGNOSIS — Z00.00 PREVENTATIVE HEALTH CARE: ICD-10-CM

## 2019-03-18 DIAGNOSIS — M72.2 PLANTAR FASCIITIS: ICD-10-CM

## 2019-03-18 DIAGNOSIS — R42 VERTIGO: ICD-10-CM

## 2019-03-18 DIAGNOSIS — G47.09 OTHER INSOMNIA: ICD-10-CM

## 2019-03-18 PROBLEM — M79.671 BILATERAL FOOT PAIN: Status: ACTIVE | Noted: 2019-03-18

## 2019-03-18 PROBLEM — M79.672 BILATERAL FOOT PAIN: Status: ACTIVE | Noted: 2019-03-18

## 2019-03-18 PROCEDURE — 99214 OFFICE O/P EST MOD 30 MIN: CPT | Performed by: PHYSICIAN ASSISTANT

## 2019-03-18 RX ORDER — TRAZODONE HYDROCHLORIDE 50 MG/1
TABLET ORAL
Qty: 90 TAB | Refills: 2 | Status: SHIPPED | OUTPATIENT
Start: 2019-03-18 | End: 2020-05-25

## 2019-03-18 RX ORDER — MECLIZINE HYDROCHLORIDE 25 MG/1
25 TABLET ORAL 3 TIMES DAILY PRN
Qty: 60 TAB | Refills: 1 | Status: SHIPPED | OUTPATIENT
Start: 2019-03-18 | End: 2020-02-05

## 2019-03-18 NOTE — ASSESSMENT & PLAN NOTE
This is a 44-year-old male complains of a approximate 1 month history of vertigo.  States once the room did spin.  Otherwise when he lies down it appears that he becomes dizzy.  Believes it may be on the right side.  Denies any nausea vomiting.  Denies any hearing loss.  No ear pain.  No recent cold symptoms.  No recent medication changes.  Has not had vertigo in the past.

## 2019-03-18 NOTE — ASSESSMENT & PLAN NOTE
Chronic history of bilateral plantar fasciitis.  Seen in the past by Dr. Gonzales.  Has had orthotics.  Been seen twice.  No improvement.  Still wakes up in the morning with pain when he walks.  Does improve with time.

## 2019-03-18 NOTE — PROGRESS NOTES
Subjective:   Mike Rios is a 44 y.o. male here today for vertigo for 1 month, insomnia and plantar fasciitis.    Vertigo  This is a 44-year-old male complains of a approximate 1 month history of vertigo.  States once the room did spin.  Otherwise when he lies down it appears that he becomes dizzy.  Believes it may be on the right side.  Denies any nausea vomiting.  Denies any hearing loss.  No ear pain.  No recent cold symptoms.  No recent medication changes.  Has not had vertigo in the past.    Other insomnia  Chronic history.  Requesting refill of trazodone.  Takes half a dose of 50 mg.  Medication is effective.    Plantar fasciitis  Chronic history of bilateral plantar fasciitis.  Seen in the past by Dr. Gonzales.  Has had orthotics.  Been seen twice.  No improvement.  Still wakes up in the morning with pain when he walks.  Does improve with time.       Current medicines (including changes today)  Current Outpatient Prescriptions   Medication Sig Dispense Refill   • meclizine (ANTIVERT) 25 MG Tab Take 1 Tab by mouth 3 times a day as needed for Vertigo. 60 Tab 1   • traZODone (DESYREL) 50 MG Tab TAKE 1/2 TABLET BY MOUTH ONCE DAILY AT BEDTIME AS NEEDED FOR SLEEP 90 Tab 2   • sertraline (ZOLOFT) 50 MG Tab TAKE 1 AND 1/2 TABLETS BY MOUTH ONCE DAILY 135 Tab 3   • SUMAtriptan (IMITREX) 50 MG Tab Take 1 Tab by mouth Once PRN for Migraine. May take second tablet 2 hours after if still with headache. 9 Tab 11   • finasteride (PROSCAR) 5 MG Tab TK 1/4 T PO ONCE D 30 Tab 11   • omeprazole (PRILOSEC) 20 MG delayed-release capsule Take 1 Cap by mouth every day. 1/2 hour prior to same meal. 90 Cap 3   • buPROPion (WELLBUTRIN XL) 300 MG XL tablet Take 1 Tab by mouth every morning. 90 Tab 3   • albuterol 108 (90 Base) MCG/ACT Aero Soln inhalation aerosol Inhale 2 Puffs by mouth every four hours as needed. 1 Inhaler 0   • cyclobenzaprine (FLEXERIL) 10 MG Tab Take 1 Tab by mouth 3 times a day as needed. 30 Tab 0   •  diclofenac EC (VOLTAREN) 75 MG Tablet Delayed Response Take 1 Tab by mouth 2 times a day. 60 Tab 0   • azelastine (ASTELIN) 137 MCG/SPRAY nasal spray [place] 2 Sprays in nose 2 Times a Day.  4   • DYMISTA 137-50 MCG/ACT Suspension Spray 2 Sprays in nose every day.  5   • ibuprofen (MOTRIN) 200 MG Tab Take 200 mg by mouth every 6 hours as needed.       No current facility-administered medications for this visit.      He  has a past medical history of Back pain; Central sleep apnea; Depression; and FLIP (obstructive sleep apnea). He also has no past medical history of ASTHMA; Diabetes; Seizure (HCC); or Ulcer.    Social History and Family History were reviewed and updated.    ROS   No chest pain, no shortness of breath, no abdominal pain and all other systems were reviewed and are negative.       Objective:     Blood pressure 108/78, pulse 66, temperature 36.2 °C (97.2 °F), temperature source Temporal, height 1.829 m (6'), weight 93.4 kg (205 lb 14.6 oz), SpO2 98 %. Body mass index is 27.93 kg/m².   Physical Exam:  Constitutional: Alert, no distress.  Skin: Warm, dry, good turgor, no rashes in visible areas.  Eye: Equal, round and reactive, conjunctiva clear, lids normal.  ENMT: Lips without lesions, good dentition, oropharynx clear.  TMs bilaterally are clear.  Neck: Trachea midline, no masses.   Lymph: No cervical or supraclavicular lymphadenopathy  Respiratory: Unlabored respiratory effort, lungs clear to auscultation, no wheezes, no ronchi.  Cardiovascular: Normal S1, S2, no murmur, no edema.  Abdomen: Soft, non-tender, no masses.  Neuro: CN II through XII intact.  No change in gait.  Psych: Alert and oriented x3, normal affect and mood.        Assessment and Plan:   The following treatment plan was discussed    1. Vertigo  Acute, new onset condition.  Likely benign etiology.  Referred to [facility] for evaluation and treatment.  Provided meclizine as directed.  Also given modified Epley maneuver handout to be  performed at home.  Discussed maneuver.  - REFERRAL TO PHYSICAL THERAPY Reason for Therapy: Eval/Treat/Report  - meclizine (ANTIVERT) 25 MG Tab; Take 1 Tab by mouth 3 times a day as needed for Vertigo.  Dispense: 60 Tab; Refill: 1    2. Other insomnia  Chronic condition.  Stable.  Renewed trazodone as directed.  - traZODone (DESYREL) 50 MG Tab; TAKE 1/2 TABLET BY MOUTH ONCE DAILY AT BEDTIME AS NEEDED FOR SLEEP  Dispense: 90 Tab; Refill: 2    3. Plantar fasciitis  New condition noted but chronic.  Referred to Dr. Moncada.  Continue to wear orthotics.  - REFERRAL TO PODIATRY    4. Preventative health care  Ordered labs fasting.  He will be contacted with the results.  - CBC WITH DIFFERENTIAL; Future  - Comp Metabolic Panel; Future  - Lipid Profile; Future  - HEMOGLOBIN A1C; Future  - VITAMIN D,25 HYDROXY; Future      Followup: No Follow-up on file.    Please note that this dictation was created using voice recognition software. I have made every reasonable attempt to correct obvious errors, but I expect that there are errors of grammar and possibly content that I did not discover before finalizing the note.

## 2019-03-18 NOTE — ASSESSMENT & PLAN NOTE
Chronic history.  Requesting refill of trazodone.  Takes half a dose of 50 mg.  Medication is effective.

## 2019-03-20 ENCOUNTER — APPOINTMENT (OUTPATIENT)
Dept: BEHAVIORAL HEALTH | Facility: CLINIC | Age: 44
End: 2019-03-20
Payer: OTHER GOVERNMENT

## 2019-03-21 ENCOUNTER — HOSPITAL ENCOUNTER (OUTPATIENT)
Dept: LAB | Facility: MEDICAL CENTER | Age: 44
End: 2019-03-21
Attending: PHYSICIAN ASSISTANT
Payer: OTHER GOVERNMENT

## 2019-03-21 DIAGNOSIS — Z00.00 PREVENTATIVE HEALTH CARE: ICD-10-CM

## 2019-03-21 LAB
25(OH)D3 SERPL-MCNC: 19 NG/ML (ref 30–100)
ALBUMIN SERPL BCP-MCNC: 4.3 G/DL (ref 3.2–4.9)
ALBUMIN/GLOB SERPL: 1.7 G/DL
ALP SERPL-CCNC: 71 U/L (ref 30–99)
ALT SERPL-CCNC: 51 U/L (ref 2–50)
ANION GAP SERPL CALC-SCNC: 3 MMOL/L (ref 0–11.9)
AST SERPL-CCNC: 29 U/L (ref 12–45)
BASOPHILS # BLD AUTO: 0.8 % (ref 0–1.8)
BASOPHILS # BLD: 0.06 K/UL (ref 0–0.12)
BILIRUB SERPL-MCNC: 0.5 MG/DL (ref 0.1–1.5)
BUN SERPL-MCNC: 23 MG/DL (ref 8–22)
CALCIUM SERPL-MCNC: 9.3 MG/DL (ref 8.5–10.5)
CHLORIDE SERPL-SCNC: 104 MMOL/L (ref 96–112)
CHOLEST SERPL-MCNC: 231 MG/DL (ref 100–199)
CO2 SERPL-SCNC: 28 MMOL/L (ref 20–33)
CREAT SERPL-MCNC: 1.17 MG/DL (ref 0.5–1.4)
EOSINOPHIL # BLD AUTO: 0.22 K/UL (ref 0–0.51)
EOSINOPHIL NFR BLD: 2.9 % (ref 0–6.9)
ERYTHROCYTE [DISTWIDTH] IN BLOOD BY AUTOMATED COUNT: 36.6 FL (ref 35.9–50)
EST. AVERAGE GLUCOSE BLD GHB EST-MCNC: 117 MG/DL
FASTING STATUS PATIENT QL REPORTED: NORMAL
GLOBULIN SER CALC-MCNC: 2.6 G/DL (ref 1.9–3.5)
GLUCOSE SERPL-MCNC: 98 MG/DL (ref 65–99)
HBA1C MFR BLD: 5.7 % (ref 0–5.6)
HCT VFR BLD AUTO: 46 % (ref 42–52)
HDLC SERPL-MCNC: 47 MG/DL
HGB BLD-MCNC: 15.1 G/DL (ref 14–18)
IMM GRANULOCYTES # BLD AUTO: 0.01 K/UL (ref 0–0.11)
IMM GRANULOCYTES NFR BLD AUTO: 0.1 % (ref 0–0.9)
LDLC SERPL CALC-MCNC: 148 MG/DL
LYMPHOCYTES # BLD AUTO: 2.6 K/UL (ref 1–4.8)
LYMPHOCYTES NFR BLD: 34.7 % (ref 22–41)
MCH RBC QN AUTO: 25.1 PG (ref 27–33)
MCHC RBC AUTO-ENTMCNC: 32.8 G/DL (ref 33.7–35.3)
MCV RBC AUTO: 76.5 FL (ref 81.4–97.8)
MONOCYTES # BLD AUTO: 0.87 K/UL (ref 0–0.85)
MONOCYTES NFR BLD AUTO: 11.6 % (ref 0–13.4)
NEUTROPHILS # BLD AUTO: 3.73 K/UL (ref 1.82–7.42)
NEUTROPHILS NFR BLD: 49.9 % (ref 44–72)
NRBC # BLD AUTO: 0 K/UL
NRBC BLD-RTO: 0 /100 WBC
PLATELET # BLD AUTO: 323 K/UL (ref 164–446)
PMV BLD AUTO: 9.2 FL (ref 9–12.9)
POTASSIUM SERPL-SCNC: 4.2 MMOL/L (ref 3.6–5.5)
PROT SERPL-MCNC: 6.9 G/DL (ref 6–8.2)
RBC # BLD AUTO: 6.01 M/UL (ref 4.7–6.1)
SODIUM SERPL-SCNC: 135 MMOL/L (ref 135–145)
TRIGL SERPL-MCNC: 181 MG/DL (ref 0–149)
WBC # BLD AUTO: 7.5 K/UL (ref 4.8–10.8)

## 2019-03-21 PROCEDURE — 82306 VITAMIN D 25 HYDROXY: CPT

## 2019-03-21 PROCEDURE — 80061 LIPID PANEL: CPT

## 2019-03-21 PROCEDURE — 80053 COMPREHEN METABOLIC PANEL: CPT

## 2019-03-21 PROCEDURE — 36415 COLL VENOUS BLD VENIPUNCTURE: CPT

## 2019-03-21 PROCEDURE — 85025 COMPLETE CBC W/AUTO DIFF WBC: CPT

## 2019-03-21 PROCEDURE — 83036 HEMOGLOBIN GLYCOSYLATED A1C: CPT

## 2019-03-22 ENCOUNTER — OFFICE VISIT (OUTPATIENT)
Dept: URGENT CARE | Facility: PHYSICIAN GROUP | Age: 44
End: 2019-03-22
Payer: OTHER GOVERNMENT

## 2019-03-22 VITALS
HEART RATE: 71 BPM | WEIGHT: 208.2 LBS | SYSTOLIC BLOOD PRESSURE: 126 MMHG | BODY MASS INDEX: 28.2 KG/M2 | HEIGHT: 72 IN | TEMPERATURE: 98.1 F | OXYGEN SATURATION: 95 % | DIASTOLIC BLOOD PRESSURE: 88 MMHG

## 2019-03-22 DIAGNOSIS — M62.838 TRAPEZIUS MUSCLE SPASM: ICD-10-CM

## 2019-03-22 PROBLEM — E78.2 MIXED HYPERLIPIDEMIA: Status: ACTIVE | Noted: 2019-03-22

## 2019-03-22 PROBLEM — R73.03 PREDIABETES: Status: ACTIVE | Noted: 2019-03-22

## 2019-03-22 PROCEDURE — 99214 OFFICE O/P EST MOD 30 MIN: CPT | Performed by: PHYSICIAN ASSISTANT

## 2019-03-22 RX ORDER — CYCLOBENZAPRINE HCL 10 MG
10 TABLET ORAL 3 TIMES DAILY PRN
Qty: 30 TAB | Refills: 0 | Status: SHIPPED | OUTPATIENT
Start: 2019-03-22 | End: 2020-09-28 | Stop reason: SDUPTHER

## 2019-03-22 ASSESSMENT — ENCOUNTER SYMPTOMS
VISUAL CHANGE: 0
BACK PAIN: 1
MUSCULOSKELETAL PAIN: 1
FEVER: 0
HEADACHES: 1
NECK PAIN: 1
CHILLS: 0

## 2019-03-23 NOTE — PROGRESS NOTES
Subjective:   Mike Rios is a 44 y.o. male who presents today with   Chief Complaint   Patient presents with   • Muscle Pain     bilateral trap pain, difficulty turning head, sweats, pain radiating up neck, x3 weeks        Muscle Pain   This is a new problem. The current episode started 1 to 4 weeks ago. The problem occurs constantly. The problem has been unchanged. Associated symptoms include headaches and neck pain. Pertinent negatives include no chills, fever or visual change.   Patient states that he has always had tightness and pain in his left shoulder but now has it in the right side. He denies numbness or tingling. He was seen by PT and treated with dry needling which helped for a couple weeks.    PMH:  has a past medical history of Back pain; Central sleep apnea; Depression; and FLIP (obstructive sleep apnea). He also has no past medical history of ASTHMA; Diabetes; Seizure (HCC); or Ulcer.  MEDS:   Current Outpatient Prescriptions:   •  cyclobenzaprine (FLEXERIL) 10 MG Tab, Take 1 Tab by mouth 3 times a day as needed., Disp: 30 Tab, Rfl: 0  •  meclizine (ANTIVERT) 25 MG Tab, Take 1 Tab by mouth 3 times a day as needed for Vertigo., Disp: 60 Tab, Rfl: 1  •  traZODone (DESYREL) 50 MG Tab, TAKE 1/2 TABLET BY MOUTH ONCE DAILY AT BEDTIME AS NEEDED FOR SLEEP, Disp: 90 Tab, Rfl: 2  •  sertraline (ZOLOFT) 50 MG Tab, TAKE 1 AND 1/2 TABLETS BY MOUTH ONCE DAILY, Disp: 135 Tab, Rfl: 3  •  SUMAtriptan (IMITREX) 50 MG Tab, Take 1 Tab by mouth Once PRN for Migraine. May take second tablet 2 hours after if still with headache., Disp: 9 Tab, Rfl: 11  •  finasteride (PROSCAR) 5 MG Tab, TK 1/4 T PO ONCE D, Disp: 30 Tab, Rfl: 11  •  omeprazole (PRILOSEC) 20 MG delayed-release capsule, Take 1 Cap by mouth every day. 1/2 hour prior to same meal., Disp: 90 Cap, Rfl: 3  •  buPROPion (WELLBUTRIN XL) 300 MG XL tablet, Take 1 Tab by mouth every morning., Disp: 90 Tab, Rfl: 3  •  albuterol 108 (90 Base) MCG/ACT Aero Soln  inhalation aerosol, Inhale 2 Puffs by mouth every four hours as needed., Disp: 1 Inhaler, Rfl: 0  •  azelastine (ASTELIN) 137 MCG/SPRAY nasal spray, Spray 2 Sprays in nose 2 Times a Day., Disp: , Rfl: 4  •  DYMISTA 137-50 MCG/ACT Suspension, Spray 2 Sprays in nose every day., Disp: , Rfl: 5  •  ibuprofen (MOTRIN) 200 MG Tab, Take 200 mg by mouth every 6 hours as needed., Disp: , Rfl:   •  diclofenac EC (VOLTAREN) 75 MG Tablet Delayed Response, Take 1 Tab by mouth 2 times a day., Disp: 60 Tab, Rfl: 0  ALLERGIES:   Allergies   Allergen Reactions   • Pcn [Penicillins]      SURGHX:   Past Surgical History:   Procedure Laterality Date   • HERNIA REPAIR       SOCHX:  reports that he has never smoked. He has never used smokeless tobacco. He reports that he drinks alcohol. He reports that he does not use drugs.  FH: Reviewed with patient, not pertinent to this visit.       Review of Systems   Constitutional: Negative for chills and fever.   Musculoskeletal: Positive for back pain and neck pain.   Neurological: Positive for headaches.   All other systems reviewed and are negative.       Objective:   /88 (BP Location: Left arm, Patient Position: Sitting, BP Cuff Size: Large adult)   Pulse 71   Temp 36.7 °C (98.1 °F) (Temporal)   Ht 1.829 m (6')   Wt 94.4 kg (208 lb 3.2 oz)   SpO2 95%   BMI 28.24 kg/m²   Physical Exam   Constitutional: Vital signs are normal. He appears well-developed and well-nourished. No distress.   HENT:   Head: Normocephalic and atraumatic.   Eyes: Pupils are equal, round, and reactive to light.   Cardiovascular: Normal rate, regular rhythm and normal heart sounds.    Pulmonary/Chest: Effort normal.   Musculoskeletal:        Cervical back: He exhibits tenderness and spasm.   Normal movement in all 4 extremities  Patient has muscle tightness in his trapezius muscles and neck bilaterally. Knots palpated within his muscles.    Neurological: He is alert. Coordination normal.   Skin: Skin is warm  and dry.   Psychiatric: He has a normal mood and affect.   Nursing note and vitals reviewed.        Assessment/Plan:   Assessment    1. Trapezius muscle spasm  - cyclobenzaprine (FLEXERIL) 10 MG Tab; Take 1 Tab by mouth 3 times a day as needed.  Dispense: 30 Tab; Refill: 0  Counseled patient to take muscle relaxer sparingly as needed and to not drive while taking it.   Encouraged patient to try getting a massage which he may need to make a routine.  Differential diagnosis, natural history, supportive care, and indications for immediate follow-up discussed.   Patient given instructions and understanding of medications and treatment.    If not improving in 3-5 days, F/U with PCP or return to  if symptoms worsen.    Patient agreeable to plan.      Jian Victor PA-C

## 2019-03-26 DIAGNOSIS — E78.2 MIXED HYPERLIPIDEMIA: ICD-10-CM

## 2019-03-26 RX ORDER — ATORVASTATIN CALCIUM 20 MG/1
20 TABLET, FILM COATED ORAL DAILY
Qty: 30 TAB | Refills: 5 | Status: SHIPPED | OUTPATIENT
Start: 2019-03-26 | End: 2019-09-06 | Stop reason: SDUPTHER

## 2019-05-07 ENCOUNTER — OFFICE VISIT (OUTPATIENT)
Dept: MEDICAL GROUP | Facility: MEDICAL CENTER | Age: 44
End: 2019-05-07
Payer: OTHER GOVERNMENT

## 2019-05-07 VITALS
TEMPERATURE: 97.7 F | OXYGEN SATURATION: 97 % | DIASTOLIC BLOOD PRESSURE: 82 MMHG | HEIGHT: 72 IN | SYSTOLIC BLOOD PRESSURE: 118 MMHG | WEIGHT: 207.8 LBS | BODY MASS INDEX: 28.15 KG/M2 | HEART RATE: 56 BPM | RESPIRATION RATE: 16 BRPM

## 2019-05-07 DIAGNOSIS — M72.2 PLANTAR FASCIITIS: ICD-10-CM

## 2019-05-07 DIAGNOSIS — M25.531 PAIN IN BOTH WRISTS: ICD-10-CM

## 2019-05-07 DIAGNOSIS — M25.532 PAIN IN BOTH WRISTS: ICD-10-CM

## 2019-05-07 DIAGNOSIS — R42 VERTIGO: ICD-10-CM

## 2019-05-07 PROCEDURE — 99214 OFFICE O/P EST MOD 30 MIN: CPT | Performed by: PHYSICIAN ASSISTANT

## 2019-05-07 RX ORDER — SILDENAFIL 100 MG/1
TABLET, FILM COATED ORAL
Refills: 5 | COMMUNITY
Start: 2019-04-19 | End: 2019-12-05

## 2019-05-07 NOTE — ASSESSMENT & PLAN NOTE
States that the vertigo that he was experiencing has resolved.  Went to PT recently and none of the exercises caused any dizziness.  He believes the symptoms have resolved.  He was worked on though with his chronic neck pain.  Had a massage by the therapist and is symptoms did improve until this morning.

## 2019-05-07 NOTE — PROGRESS NOTES
Subjective:   Mike Rios is a 44 y.o. male here today for chronic history of pain in bilateral wrists, chronic history of plantar fasciitis and vertigo.    Pain in both wrists  This is a 44-year-old male who is here today to discuss chronic pain in both of his wrists.  Pain typically in the right wrist but also on the left side.  Denies any significant trauma in the past.  He exercises daily.  States is difficult to do push-ups with the wrist pain.  Also at times when he types there is pain in his wrist.  Denies waking up at nighttime with wrist pain.  Denies any numbness.  Believes he may have carpal tunnel.  States he will take ibuprofen for pain.  Also has a prescription for diclofenac.    Plantar fasciitis  Yesterday went to [facility] to discuss his chronic history of plantar fasciitis.  Pain on both sides.  Currently on nonsteroidal medication.    Vertigo  States that the vertigo that he was experiencing has resolved.  Went to PT recently and none of the exercises caused any dizziness.  He believes the symptoms have resolved.  He was worked on though with his chronic neck pain.  Had a massage by the therapist and is symptoms did improve until this morning.       Current medicines (including changes today)  Current Outpatient Prescriptions   Medication Sig Dispense Refill   • sildenafil citrate (VIAGRA) 100 MG tablet TK 1 T PO ONCE D 30 MIN B ACTIVITY  5   • atorvastatin (LIPITOR) 20 MG Tab Take 1 Tab by mouth every day. 30 Tab 5   • cyclobenzaprine (FLEXERIL) 10 MG Tab Take 1 Tab by mouth 3 times a day as needed. 30 Tab 0   • meclizine (ANTIVERT) 25 MG Tab Take 1 Tab by mouth 3 times a day as needed for Vertigo. 60 Tab 1   • traZODone (DESYREL) 50 MG Tab TAKE 1/2 TABLET BY MOUTH ONCE DAILY AT BEDTIME AS NEEDED FOR SLEEP 90 Tab 2   • sertraline (ZOLOFT) 50 MG Tab TAKE 1 AND 1/2 TABLETS BY MOUTH ONCE DAILY 135 Tab 3   • SUMAtriptan (IMITREX) 50 MG Tab Take 1 Tab by mouth Once PRN for  Migraine. May take second tablet 2 hours after if still with headache. 9 Tab 11   • finasteride (PROSCAR) 5 MG Tab TK 1/4 T PO ONCE D 30 Tab 11   • omeprazole (PRILOSEC) 20 MG delayed-release capsule Take 1 Cap by mouth every day. 1/2 hour prior to same meal. 90 Cap 3   • buPROPion (WELLBUTRIN XL) 300 MG XL tablet Take 1 Tab by mouth every morning. 90 Tab 3   • albuterol 108 (90 Base) MCG/ACT Aero Soln inhalation aerosol Inhale 2 Puffs by mouth every four hours as needed. 1 Inhaler 0   • ibuprofen (MOTRIN) 200 MG Tab Take 200 mg by mouth every 6 hours as needed.     • diclofenac EC (VOLTAREN) 75 MG Tablet Delayed Response Take 1 Tab by mouth 2 times a day. 60 Tab 0   • azelastine (ASTELIN) 137 MCG/SPRAY nasal spray [place] 2 Sprays in nose 2 Times a Day.  4   • DYMISTA 137-50 MCG/ACT Suspension Spray 2 Sprays in nose every day.  5     No current facility-administered medications for this visit.      He  has a past medical history of Back pain; Central sleep apnea; Depression; and FLIP (obstructive sleep apnea). He also has no past medical history of ASTHMA; Diabetes; Seizure (HCC); or Ulcer.    Social History and Family History were reviewed and updated.    ROS   No chest pain, no shortness of breath, no abdominal pain and all other systems were reviewed and are negative.       Objective:     /82 (BP Location: Right arm, Patient Position: Sitting, BP Cuff Size: Adult)   Pulse (!) 56   Temp 36.5 °C (97.7 °F) (Temporal)   Resp 16   Ht 1.829 m (6')   Wt 94.3 kg (207 lb 12.8 oz)   SpO2 97%  Body mass index is 28.18 kg/m².   Physical Exam:  Constitutional: Alert, no distress.  Skin: Warm, dry, good turgor, no rashes in visible areas.  Eye: Equal, round and reactive, conjunctiva clear, lids normal.  ENMT: Lips without lesions, good dentition, oropharynx clear.  Neck: Trachea midline, no masses.   Lymph: No cervical or supraclavicular lymphadenopathy  Respiratory: Unlabored respiratory effort, lungs appear clear,  no wheezes.  Cardiovascular: Regular rate and rhythm.  Musculoskeletal: Hands appear symmetrical.  Bilateral muscle strength at 5 out of 5.  Range of motion is good.  Tinel's and Phalen's test were negative.  Psych: Alert and oriented x3, normal affect and mood.        Assessment and Plan:   The following treatment plan was discussed    1. Pain in both wrists  Chronic condition.  New condition noted in chart.  Refer to orthopedics for evaluation and treatment.  Symptoms could be secondary to arthritis versus carpal tunnel.  Discuss ergonomics when typing.  Discussed taking Aleve over-the-counter as directed.  Do not take Aleve and diclofenac during the same day.  - REFERRAL TO ORTHOPEDICS    2. Plantar fasciitis  Chronic condition.  Stable.  Continue to follow with orthopedics at [facility].    3. Vertigo  Acute condition which has likely resolved.  Contact me with any worsening concerns.      Followup: Return if symptoms worsen or fail to improve.    Please note that this dictation was created using voice recognition software. I have made every reasonable attempt to correct obvious errors, but I expect that there are errors of grammar and possibly content that I did not discover before finalizing the note.

## 2019-05-07 NOTE — ASSESSMENT & PLAN NOTE
Yesterday went to [place] [facility] to discuss his chronic history of plantar fasciitis.  Pain on both sides.  Currently on nonsteroidal medication.

## 2019-05-07 NOTE — ASSESSMENT & PLAN NOTE
This is a 44-year-old male who is here today to discuss chronic pain in both of his wrists.  Pain typically in the right wrist but also on the left side.  Denies any significant trauma in the past.  He exercises daily.  States is difficult to do push-ups with the wrist pain.  Also at times when he types there is pain in his wrist.  Denies waking up at nighttime with wrist pain.  Denies any numbness.  Believes he may have carpal tunnel.  States he will take ibuprofen for pain.  Also has a prescription for diclofenac.

## 2019-08-29 ENCOUNTER — OFFICE VISIT (OUTPATIENT)
Dept: MEDICAL GROUP | Facility: MEDICAL CENTER | Age: 44
End: 2019-08-29
Payer: OTHER GOVERNMENT

## 2019-08-29 VITALS
TEMPERATURE: 97.5 F | BODY MASS INDEX: 28.07 KG/M2 | DIASTOLIC BLOOD PRESSURE: 82 MMHG | HEIGHT: 72 IN | SYSTOLIC BLOOD PRESSURE: 114 MMHG | OXYGEN SATURATION: 95 % | RESPIRATION RATE: 16 BRPM | WEIGHT: 207.23 LBS | HEART RATE: 68 BPM

## 2019-08-29 DIAGNOSIS — M25.531 PAIN IN BOTH WRISTS: ICD-10-CM

## 2019-08-29 DIAGNOSIS — F33.1 MAJOR DEPRESSIVE DISORDER, RECURRENT EPISODE, MODERATE (HCC): ICD-10-CM

## 2019-08-29 DIAGNOSIS — F41.1 GAD (GENERALIZED ANXIETY DISORDER): ICD-10-CM

## 2019-08-29 DIAGNOSIS — R04.0 EPISTAXIS: ICD-10-CM

## 2019-08-29 DIAGNOSIS — M72.2 PLANTAR FASCIITIS: ICD-10-CM

## 2019-08-29 DIAGNOSIS — S03.00XA TMJ (DISLOCATION OF TEMPOROMANDIBULAR JOINT), INITIAL ENCOUNTER: ICD-10-CM

## 2019-08-29 DIAGNOSIS — M25.532 PAIN IN BOTH WRISTS: ICD-10-CM

## 2019-08-29 PROCEDURE — 99214 OFFICE O/P EST MOD 30 MIN: CPT | Performed by: PHYSICIAN ASSISTANT

## 2019-08-29 NOTE — ASSESSMENT & PLAN NOTE
Also complains of a chronic history of nosebleeds on the right side when he becomes stressed.  Typically it may occur at work.  Denies any other symptoms such as headaches.  No nausea vomiting.  Does have chronic allergies.

## 2019-08-29 NOTE — PROGRESS NOTES
Subjective:   Mike Rios is a 44 y.o. male here today for bilateral wrist pain, plantar fasciitis now worse with the heel pain on the left side, TMJ, depression and anxiety and epistaxis.    Pain in both wrists  This is a 44-year-old male who is here today to discuss a few chronic medical conditions.  Still has chronic pain in both of his wrist.  Difficult to do any PT.  He denies any trauma.  Exercises routinely.  Push-ups make it difficult to perform given the wrist pain.  He saw Dr. Cardoso at the [facility].  Diagnosed with a strain.  Suggested injections into the wrist.  He has not yet followed up with that appointment.    Plantar fasciitis  Does have a history of being diagnosed with plantar fasciitis.  Pains in the side of his feet.  Recently the pain started to bother him on the left heel.  Pain is worse when he gets up in the morning.  He has been having symptoms like that for about 1-1/2 months.  Does take nonsteroidal medications.    TMJ (dislocation of temporomandibular joint), initial encounter  Diagnosed recently with right-sided TMJ.  Saw his dentist.  Dentist advised him to return to get fitted for a mouthguard.  Pain is daily.  Worse when chewing.  Significant stress in his job.    Major depressive disorder, recurrent episode, moderate (CMS-HCC)  Chronic condition of depression and anxiety.  Currently on Zoloft 50 mg.  Also taking Wellbutrin 300 mg daily.  Recently forgot his medication and he went through some rough times.  Requesting a referral to see his psychologist.    Epistaxis  Also complains of a chronic history of nosebleeds on the right side when he becomes stressed.  Typically it may occur at work.  Denies any other symptoms such as headaches.  No nausea vomiting.  Does have chronic allergies.       Current medicines (including changes today)  Current Outpatient Medications   Medication Sig Dispense Refill   • sildenafil citrate (VIAGRA) 100 MG tablet TK 1 T PO ONCE D  30 MIN B ACTIVITY  5   • atorvastatin (LIPITOR) 20 MG Tab Take 1 Tab by mouth every day. 30 Tab 5   • cyclobenzaprine (FLEXERIL) 10 MG Tab Take 1 Tab by mouth 3 times a day as needed. 30 Tab 0   • meclizine (ANTIVERT) 25 MG Tab Take 1 Tab by mouth 3 times a day as needed for Vertigo. 60 Tab 1   • traZODone (DESYREL) 50 MG Tab TAKE 1/2 TABLET BY MOUTH ONCE DAILY AT BEDTIME AS NEEDED FOR SLEEP 90 Tab 2   • sertraline (ZOLOFT) 50 MG Tab TAKE 1 AND 1/2 TABLETS BY MOUTH ONCE DAILY 135 Tab 3   • SUMAtriptan (IMITREX) 50 MG Tab Take 1 Tab by mouth Once PRN for Migraine. May take second tablet 2 hours after if still with headache. 9 Tab 11   • finasteride (PROSCAR) 5 MG Tab TK 1/4 T PO ONCE D 30 Tab 11   • omeprazole (PRILOSEC) 20 MG delayed-release capsule Take 1 Cap by mouth every day. 1/2 hour prior to same meal. 90 Cap 3   • buPROPion (WELLBUTRIN XL) 300 MG XL tablet Take 1 Tab by mouth every morning. 90 Tab 3   • albuterol 108 (90 Base) MCG/ACT Aero Soln inhalation aerosol Inhale 2 Puffs by mouth every four hours as needed. 1 Inhaler 0   • azelastine (ASTELIN) 137 MCG/SPRAY nasal spray [place] 2 Sprays in nose 2 Times a Day.  4   • DYMISTA 137-50 MCG/ACT Suspension Spray 2 Sprays in nose every day.  5   • ibuprofen (MOTRIN) 200 MG Tab Take 200 mg by mouth every 6 hours as needed.     • diclofenac EC (VOLTAREN) 75 MG Tablet Delayed Response Take 1 Tab by mouth 2 times a day. 60 Tab 0     No current facility-administered medications for this visit.      He  has a past medical history of Back pain, Central sleep apnea, Depression, and FLIP (obstructive sleep apnea). He also has no past medical history of ASTHMA, Diabetes, Seizure (HCC), or Ulcer.    Social History and Family History were reviewed and updated.    ROS   No chest pain, no shortness of breath, no abdominal pain and all other systems were reviewed and are negative.       Objective:     /82 (BP Location: Left arm, Patient Position: Sitting, BP Cuff Size:  Adult)   Pulse 68   Temp 36.4 °C (97.5 °F) (Temporal)   Resp 16   Ht 1.829 m (6')   Wt 94 kg (207 lb 3.7 oz)   SpO2 95%  Body mass index is 28.11 kg/m².  He thinks he had  Physical Exam:  Constitutional: Alert, no distress.  Skin: Warm, dry, good turgor, no rashes in visible areas.  Eye: Equal, round and reactive, conjunctiva clear, lids normal.  ENMT: Lips without lesions, good dentition, oropharynx clear.  Left nasal passage.  Congested.  Scant amount of fresh blood noted.  No known source.  Right side with some scab lesion on the septum.  No significant surrounding erythema.  Significant discharge noted.  Neck: Trachea midline, no masses.   Lymph: No cervical or supraclavicular lymphadenopathy  Respiratory: Unlabored respiratory effort, lungs appear clear, no wheezes.  Cardiovascular: Normal S1, S2, no murmur, no edema.  Psych: Alert and oriented x3, normal affect and mood.        Assessment and Plan:   The following treatment plan was discussed    1. Pain in both wrists  Chronic condition.  Diagnosed as a strain.  Follow with orthopedics for injections.    2. Plantar fasciitis  Chronic condition now with left-sided heel pain.  Advised to stretch prior to walking in the morning.  Apply ice.  Continue ibuprofen.  Continue wear shoes with good support.  Follow-up with orthopedics to discuss steroid injection if needed.    3. TMJ (dislocation of temporomandibular joint), initial encounter  New condition noted but chronic.  Follow with dentistry.  Referred to Dr. Colunga.  - REFERRAL TO TMJ PAIN CLINIC    4. Epistaxis  New condition noted but chronic.  Worse in times of stress.  Unknown cause.  Advised to apply Vaseline possibly at nighttime and to the right nostril on the septal wall.  Refer to ENT for evaluation.  - REFERRAL TO ENT    5. Major depressive disorder, recurrent episode, moderate (HCC)  Chronic condition.  Stable.  Continue Wellbutrin and Zoloft.  Referred to psychology.  - REFERRAL TO BEHAVIORAL  HEALTH    Followup: Return in about 3 months (around 11/29/2019).    Please note that this dictation was created using voice recognition software. I have made every reasonable attempt to correct obvious errors, but I expect that there are errors of grammar and possibly content that I did not discover before finalizing the note.

## 2019-08-29 NOTE — ASSESSMENT & PLAN NOTE
Chronic condition of depression and anxiety.  Currently on Zoloft 50 mg.  Also taking Wellbutrin 300 mg daily.  Recently forgot his medication and he went through some rough times.  Requesting a referral to see his psychologist.

## 2019-08-29 NOTE — ASSESSMENT & PLAN NOTE
Does have a history of being diagnosed with plantar fasciitis.  Pains in the side of his feet.  Recently the pain started to bother him on the left heel.  Pain is worse when he gets up in the morning.  He has been having symptoms like that for about 1-1/2 months.  Does take nonsteroidal medications.

## 2019-08-29 NOTE — ASSESSMENT & PLAN NOTE
This is a 44-year-old male who is here today to discuss a few chronic medical conditions.  Still has chronic pain in both of his wrist.  Difficult to do any PT.  He denies any trauma.  Exercises routinely.  Push-ups make it difficult to perform given the wrist pain.  He saw Dr. Cardoso at the [facility].  Diagnosed with a strain.  Suggested injections into the wrist.  He has not yet followed up with that appointment.

## 2019-08-29 NOTE — ASSESSMENT & PLAN NOTE
Diagnosed recently with right-sided TMJ.  Saw his dentist.  Dentist advised him to return to get fitted for a mouthguard.  Pain is daily.  Worse when chewing.  Significant stress in his job.

## 2019-09-06 DIAGNOSIS — E78.2 MIXED HYPERLIPIDEMIA: ICD-10-CM

## 2019-09-06 RX ORDER — ATORVASTATIN CALCIUM 20 MG/1
TABLET, FILM COATED ORAL
Qty: 90 TAB | Refills: 0 | Status: SHIPPED | OUTPATIENT
Start: 2019-09-06 | End: 2020-01-15

## 2019-09-09 DIAGNOSIS — J30.89 CHRONIC NON-SEASONAL ALLERGIC RHINITIS: ICD-10-CM

## 2019-09-09 DIAGNOSIS — G47.33 OBSTRUCTIVE SLEEP APNEA: Chronic | ICD-10-CM

## 2019-09-10 RX ORDER — SUMATRIPTAN 50 MG/1
TABLET, FILM COATED ORAL
Qty: 10 TAB | Refills: 0 | Status: SHIPPED | OUTPATIENT
Start: 2019-09-10 | End: 2019-12-05

## 2019-11-25 ENCOUNTER — OFFICE VISIT (OUTPATIENT)
Dept: MEDICAL GROUP | Facility: MEDICAL CENTER | Age: 44
End: 2019-11-25
Payer: OTHER GOVERNMENT

## 2019-11-25 VITALS
HEIGHT: 72 IN | DIASTOLIC BLOOD PRESSURE: 80 MMHG | SYSTOLIC BLOOD PRESSURE: 118 MMHG | HEART RATE: 78 BPM | BODY MASS INDEX: 28.67 KG/M2 | TEMPERATURE: 98.6 F | WEIGHT: 211.64 LBS | OXYGEN SATURATION: 94 % | RESPIRATION RATE: 16 BRPM

## 2019-11-25 DIAGNOSIS — M25.512 CHRONIC LEFT SHOULDER PAIN: ICD-10-CM

## 2019-11-25 DIAGNOSIS — M72.2 PLANTAR FASCIITIS, BILATERAL: ICD-10-CM

## 2019-11-25 DIAGNOSIS — M25.522 LEFT ELBOW PAIN: ICD-10-CM

## 2019-11-25 DIAGNOSIS — G89.29 CHRONIC LEFT SHOULDER PAIN: ICD-10-CM

## 2019-11-25 DIAGNOSIS — Z23 INFLUENZA VACCINE NEEDED: ICD-10-CM

## 2019-11-25 DIAGNOSIS — M25.511 ACUTE PAIN OF RIGHT SHOULDER: ICD-10-CM

## 2019-11-25 DIAGNOSIS — M25.521 RIGHT ELBOW PAIN: ICD-10-CM

## 2019-11-25 PROCEDURE — 90686 IIV4 VACC NO PRSV 0.5 ML IM: CPT | Performed by: PHYSICIAN ASSISTANT

## 2019-11-25 PROCEDURE — 99214 OFFICE O/P EST MOD 30 MIN: CPT | Mod: 25 | Performed by: PHYSICIAN ASSISTANT

## 2019-11-25 PROCEDURE — 90471 IMMUNIZATION ADMIN: CPT | Performed by: PHYSICIAN ASSISTANT

## 2019-11-25 NOTE — PROGRESS NOTES
Subjective:   Mike Rios is a 44 y.o. male here today for influenza vaccination and musculoskeletal complaints.    Right elbow pain  This is a 44-year-old male who is here today for couple items.  Has had pain in the right elbow.  Pain on the top of the elbow.  Believes he has tennis elbow.  Had the same thing on the left elbow.  Was seen at the [facility].  Diagnosed with tennis elbow.  Told to wear a brace.  Probably do the same thing for the elbow on the right side that he did for the left.    Acute pain of right shoulder  2-month history of right shoulder and trapezius pain.  Denies trauma.  Has chronic pain in the left shoulder.  Would like to see orthopedics.    Chronic left shoulder pain  Chronic condition.  States he had PT needling performed on the trapezius muscle and then MRI was done on the left shoulder that showed a tear.  He would like to be referred back to orthopedics for evaluation of left shoulder.    Plantar fasciitis, bilateral  Advised by podiatry he would need surgery on the both feet.  Does not want to go through surgery yet.         Current medicines (including changes today)  Current Outpatient Medications   Medication Sig Dispense Refill   • SUMAtriptan (IMITREX) 50 MG Tab TAKE 1 TABLET BY MOUTH 1 TIME AS NEEDED FOR MIGRAINE. MAY TAKE SECOND TABLET 2 HOURS AFTER IF STILL WITH HEADACHE 10 Tab 0   • atorvastatin (LIPITOR) 20 MG Tab TAKE 1 TABLET BY MOUTH ONCE DAILY 90 Tab 0   • sildenafil citrate (VIAGRA) 100 MG tablet TK 1 T PO ONCE D 30 MIN B ACTIVITY  5   • cyclobenzaprine (FLEXERIL) 10 MG Tab Take 1 Tab by mouth 3 times a day as needed. 30 Tab 0   • meclizine (ANTIVERT) 25 MG Tab Take 1 Tab by mouth 3 times a day as needed for Vertigo. 60 Tab 1   • traZODone (DESYREL) 50 MG Tab TAKE 1/2 TABLET BY MOUTH ONCE DAILY AT BEDTIME AS NEEDED FOR SLEEP 90 Tab 2   • sertraline (ZOLOFT) 50 MG Tab TAKE 1 AND 1/2 TABLETS BY MOUTH ONCE DAILY 135 Tab 3   • SUMAtriptan (IMITREX) 50 MG Tab Take 1 Tab  by mouth Once PRN for Migraine. May take second tablet 2 hours after if still with headache. 9 Tab 11   • finasteride (PROSCAR) 5 MG Tab TK 1/4 T PO ONCE D 30 Tab 11   • omeprazole (PRILOSEC) 20 MG delayed-release capsule Take 1 Cap by mouth every day. 1/2 hour prior to same meal. 90 Cap 3   • buPROPion (WELLBUTRIN XL) 300 MG XL tablet Take 1 Tab by mouth every morning. 90 Tab 3   • albuterol 108 (90 Base) MCG/ACT Aero Soln inhalation aerosol Inhale 2 Puffs by mouth every four hours as needed. 1 Inhaler 0   • azelastine (ASTELIN) 137 MCG/SPRAY nasal spray [place] 2 Sprays in nose 2 Times a Day.  4   • DYMISTA 137-50 MCG/ACT Suspension Spray 2 Sprays in nose every day.  5   • diclofenac EC (VOLTAREN) 75 MG Tablet Delayed Response Take 1 Tab by mouth 2 times a day. 60 Tab 0   • ibuprofen (MOTRIN) 200 MG Tab Take 200 mg by mouth every 6 hours as needed.       No current facility-administered medications for this visit.      He  has a past medical history of Back pain, Central sleep apnea, Depression, and FLIP (obstructive sleep apnea). He also has no past medical history of ASTHMA, Diabetes, Seizure (HCC), or Ulcer.    Social History and Family History were reviewed and updated.    ROS   No chest pain, no shortness of breath, no abdominal pain and all other systems were reviewed and are negative.       Objective:     /80 (BP Location: Left arm, Patient Position: Sitting, BP Cuff Size: Adult)   Pulse 78   Temp 37 °C (98.6 °F) (Temporal)   Resp 16   Ht 1.829 m (6')   Wt 96 kg (211 lb 10.3 oz)   SpO2 94%  Body mass index is 28.7 kg/m².   Physical Exam:  Constitutional: Alert, no distress.  Skin: Warm, dry, good turgor, no rashes in visible areas.  Eye: Equal, round and reactive, conjunctiva clear, lids normal.  ENMT: Lips without lesions, good dentition, oropharynx clear.  Neck: Trachea midline, no masses.   Lymph: No cervical or supraclavicular lymphadenopathy  Respiratory: Unlabored respiratory effort, lungs  appear clear, no wheezes.  Cardiovascular: Regular rate and rhythm.  Psych: Alert and oriented x3, normal affect and mood.        Assessment and Plan:   The following treatment plan was discussed    1. Right elbow pain  Acute, new onset condition.  Advised to purchase a brace.  We will continue to monitor.    2. Chronic left shoulder pain  Chronic condition.  Prior MRI with some concern.  Refer to orthopedics for evaluation.  - REFERRAL TO ORTHOPEDICS    3. Acute pain of right shoulder  Acute, new onset condition.  Per patient's request referred orthopedics.  - REFERRAL TO ORTHOPEDICS    4. Influenza vaccine needed  Administered without complaints.  - Influenza Vaccine Quad Injection (PF)      Followup: Return if symptoms worsen or fail to improve, for Appointment next month.    Please note that this dictation was created using voice recognition software. I have made every reasonable attempt to correct obvious errors, but I expect that there are errors of grammar and possibly content that I did not discover before finalizing the note.

## 2019-11-25 NOTE — ASSESSMENT & PLAN NOTE
Chronic condition.  States he had PT needling performed on the trapezius muscle and then MRI was done on the left shoulder that showed a tear.  He would like to be referred back to orthopedics for evaluation of left shoulder.

## 2019-11-25 NOTE — ASSESSMENT & PLAN NOTE
Advised by podiatry he would need surgery on the both feet.  Does not want to go through surgery yet.

## 2019-11-25 NOTE — ASSESSMENT & PLAN NOTE
This is a 44-year-old male who is here today for couple items.  Has had pain in the right elbow.  Pain on the top of the elbow.  Believes he has tennis elbow.  Had the same thing on the left elbow.  Was seen at the [facility].  Diagnosed with tennis elbow.  Told to wear a brace.  Probably do the same thing for the elbow on the right side that he did for the left.

## 2019-11-25 NOTE — ASSESSMENT & PLAN NOTE
2-month history of right shoulder and trapezius pain.  Denies trauma.  Has chronic pain in the left shoulder.  Would like to see orthopedics.

## 2019-12-05 DIAGNOSIS — N52.2 DRUG-INDUCED ERECTILE DYSFUNCTION: ICD-10-CM

## 2019-12-05 RX ORDER — TADALAFIL 20 MG/1
20 TABLET ORAL PRN
Qty: 10 TAB | Refills: 3 | Status: SHIPPED | OUTPATIENT
Start: 2019-12-05 | End: 2020-01-16

## 2019-12-11 DIAGNOSIS — F33.1 MAJOR DEPRESSIVE DISORDER, RECURRENT EPISODE, MODERATE (HCC): ICD-10-CM

## 2019-12-11 RX ORDER — BUPROPION HYDROCHLORIDE 300 MG/1
TABLET ORAL
Qty: 90 TAB | Refills: 1 | Status: SHIPPED | OUTPATIENT
Start: 2019-12-11 | End: 2020-05-31

## 2019-12-19 RX ORDER — SUMATRIPTAN 50 MG/1
TABLET, FILM COATED ORAL
Qty: 10 TAB | Refills: 0 | Status: SHIPPED | OUTPATIENT
Start: 2019-12-19 | End: 2020-01-16

## 2020-01-15 DIAGNOSIS — E78.2 MIXED HYPERLIPIDEMIA: ICD-10-CM

## 2020-01-15 RX ORDER — ATORVASTATIN CALCIUM 20 MG/1
TABLET, FILM COATED ORAL
Qty: 90 TAB | Refills: 0 | Status: SHIPPED | OUTPATIENT
Start: 2020-01-15 | End: 2020-05-03

## 2020-01-16 ENCOUNTER — TELEPHONE (OUTPATIENT)
Dept: MEDICAL GROUP | Facility: MEDICAL CENTER | Age: 45
End: 2020-01-16

## 2020-01-16 DIAGNOSIS — N52.2 DRUG-INDUCED ERECTILE DYSFUNCTION: ICD-10-CM

## 2020-01-16 RX ORDER — SILDENAFIL 100 MG/1
TABLET, FILM COATED ORAL
Qty: 30 TAB | Refills: 5 | Status: SHIPPED | OUTPATIENT
Start: 2020-01-16 | End: 2020-05-26 | Stop reason: SDUPTHER

## 2020-01-16 RX ORDER — SUMATRIPTAN 50 MG/1
TABLET, FILM COATED ORAL
Qty: 9 TAB | Refills: 5 | Status: SHIPPED | OUTPATIENT
Start: 2020-01-16 | End: 2020-05-27

## 2020-01-16 NOTE — TELEPHONE ENCOUNTER
Received a fax from [facility] pharmacy on [address] [facility]    tadalafil (CIALIS) 20 MG tablet    Is not covered by the patients plan,    Viagra is the preferred alternative, please send in a new prescription.

## 2020-01-18 DIAGNOSIS — F41.1 GAD (GENERALIZED ANXIETY DISORDER): ICD-10-CM

## 2020-01-18 DIAGNOSIS — F33.1 MAJOR DEPRESSIVE DISORDER, RECURRENT EPISODE, MODERATE (HCC): ICD-10-CM

## 2020-02-05 DIAGNOSIS — R42 VERTIGO: ICD-10-CM

## 2020-02-05 RX ORDER — MECLIZINE HYDROCHLORIDE 25 MG/1
TABLET ORAL
Qty: 60 TAB | Refills: 1 | Status: SHIPPED | OUTPATIENT
Start: 2020-02-05 | End: 2020-08-18

## 2020-02-13 ENCOUNTER — APPOINTMENT (OUTPATIENT)
Dept: MEDICAL GROUP | Facility: MEDICAL CENTER | Age: 45
End: 2020-02-13
Payer: OTHER GOVERNMENT

## 2020-02-13 ENCOUNTER — OFFICE VISIT (OUTPATIENT)
Dept: MEDICAL GROUP | Facility: MEDICAL CENTER | Age: 45
End: 2020-02-13

## 2020-02-13 VITALS
TEMPERATURE: 98.4 F | OXYGEN SATURATION: 95 % | SYSTOLIC BLOOD PRESSURE: 110 MMHG | HEIGHT: 72 IN | DIASTOLIC BLOOD PRESSURE: 76 MMHG | WEIGHT: 211.64 LBS | HEART RATE: 76 BPM | RESPIRATION RATE: 16 BRPM | BODY MASS INDEX: 28.67 KG/M2

## 2020-02-13 DIAGNOSIS — G89.29 CHRONIC LEFT-SIDED LOW BACK PAIN WITH LEFT-SIDED SCIATICA: ICD-10-CM

## 2020-02-13 DIAGNOSIS — M79.661 PAIN IN BOTH LOWER LEGS: ICD-10-CM

## 2020-02-13 DIAGNOSIS — G56.03 CARPAL TUNNEL SYNDROME, BILATERAL: ICD-10-CM

## 2020-02-13 DIAGNOSIS — M79.662 PAIN IN BOTH LOWER LEGS: ICD-10-CM

## 2020-02-13 DIAGNOSIS — M54.42 CHRONIC LEFT-SIDED LOW BACK PAIN WITH LEFT-SIDED SCIATICA: ICD-10-CM

## 2020-02-13 PROCEDURE — 99214 OFFICE O/P EST MOD 30 MIN: CPT | Performed by: PHYSICIAN ASSISTANT

## 2020-02-13 NOTE — PROGRESS NOTES
Subjective:   Mike Rios is a 44 y.o. male here today for chronic history of left-sided low back pain with sciatica, bilateral carpal tunnel and pain bilateral legs.    Chronic left-sided low back pain with left-sided sciatica  This is a 44-year-old male continues to have flares of lower left-sided back pain with sciatica.  Recently seen in urgent care.  Taking nonsteroidals and a muscle relaxer.  Still has some minor pain with pain down the left leg.  The past has had an x-ray but today is requesting an MRI.  Has not had an MRI in the past.  Does exercise routinely.  Has not been effective.  He said symptoms for at least 2 years.    Carpal tunnel syndrome, bilateral  Also complains of a couple month history of bilateral wrist and hand pain.  Numbness.  Symptoms occur during the daytime when he is typing as well as at nighttime when he is sleeping.  Pain radiates up the right elbow.  In the past had elbow pain but that was secondary to tennis elbow.  No improvement recently.  Is taking nonsteroidals and Tylenol without any improvement.  Complains of slight weakness of the right hand.    Pain in both lower legs  Also complains of an approximate 3-month history of bilateral lower leg discomfort.  Symptoms worse after running.  Has tried to modify his exercise routine.  Still has some pain on the sides of the legs.       Current medicines (including changes today)  Current Outpatient Medications   Medication Sig Dispense Refill   • meclizine (ANTIVERT) 25 MG Tab TAKE 1 TABLET BY MOUTH THREE TIMES DAILY AS NEEDED FOR VERTIGO 60 Tab 1   • sertraline (ZOLOFT) 50 MG Tab TAKE 1 AND 1/2 TABLETS BY MOUTH EVERY  Tab 3   • SUMAtriptan (IMITREX) 50 MG Tab TAKE 1 TABLET BY MOUTH 1 TIME AS NEEDED FOR MIGRAINE. MAY TAKE SECOND TABLET 2 HOURS AFTER AS NEEDED 9 Tab 5   • sildenafil citrate (VIAGRA) 100 MG tablet TAKE 1 TAB PO ONCE DAILY 30 MIN PRIOR TO ACTIVITY 30 Tab 5   • atorvastatin (LIPITOR) 20 MG Tab TAKE 1  TABLET BY MOUTH EVERY DAY 90 Tab 0   • buPROPion (WELLBUTRIN XL) 300 MG XL tablet TAKE 1 TABLET BY MOUTH ONCE DAILY IN THE MORNING 90 Tab 1   • cyclobenzaprine (FLEXERIL) 10 MG Tab Take 1 Tab by mouth 3 times a day as needed. 30 Tab 0   • traZODone (DESYREL) 50 MG Tab TAKE 1/2 TABLET BY MOUTH ONCE DAILY AT BEDTIME AS NEEDED FOR SLEEP 90 Tab 2   • finasteride (PROSCAR) 5 MG Tab TK 1/4 T PO ONCE D 30 Tab 11   • omeprazole (PRILOSEC) 20 MG delayed-release capsule Take 1 Cap by mouth every day. 1/2 hour prior to same meal. 90 Cap 3   • albuterol 108 (90 Base) MCG/ACT Aero Soln inhalation aerosol Inhale 2 Puffs by mouth every four hours as needed. 1 Inhaler 0   • azelastine (ASTELIN) 137 MCG/SPRAY nasal spray [place] 2 Sprays in nose 2 Times a Day.  4   • DYMISTA 137-50 MCG/ACT Suspension Spray 2 Sprays in nose every day.  5   • ibuprofen (MOTRIN) 200 MG Tab Take 200 mg by mouth every 6 hours as needed.     • diclofenac EC (VOLTAREN) 75 MG Tablet Delayed Response Take 1 Tab by mouth 2 times a day. 60 Tab 0     No current facility-administered medications for this visit.      He  has a past medical history of Back pain, Central sleep apnea, Depression, and FLIP (obstructive sleep apnea). He also has no past medical history of ASTHMA, Diabetes, Seizure (HCC), or Ulcer.    Social History and Family History were reviewed and updated.    ROS   No chest pain, no shortness of breath, no abdominal pain and all other systems were reviewed and are negative.       Objective:     /76 (BP Location: Left arm, Patient Position: Sitting, BP Cuff Size: Adult)   Pulse 76   Temp 36.9 °C (98.4 °F) (Temporal)   Resp 16   Ht 1.829 m (6')   Wt 96 kg (211 lb 10.3 oz)   SpO2 95%  Body mass index is 28.7 kg/m².   Physical Exam:  Constitutional: Alert, no distress.  Skin: Warm, dry, good turgor, no rashes in visible areas.  Eye: Equal, round and reactive, conjunctiva clear, lids normal.  ENMT: Lips without lesions, good dentition,  oropharynx clear.  Neck: Trachea midline, no masses.   Lymph: No cervical or supraclavicular lymphadenopathy  Respiratory: Unlabored respiratory effort, lungs appear clear, no wheezes.  Cardiovascular: Normal S1, S2, no murmur, no edema.  Musculoskeletal: Bilateral hand strength at 5 out of 5.  Tinel's and Phalen's test negative.  Psych: Alert and oriented x3, normal affect and mood.        Assessment and Plan:   The following treatment plan was discussed    1. Chronic left-sided low back pain with left-sided sciatica  Chronic condition.  Recent exacerbation.  Continue nonsteroidals and muscle relaxer as directed.  Ordered MRI lumbar spine.  Discussed imaging.  - MR-LUMBAR SPINE-W/O; Future    2. Pain in both lower legs  Acute, new onset condition.  Likely secondary to overuse.  Refrain from running.  We will continue to monitor.    3. Carpal tunnel syndrome, bilateral  Acute, new onset condition.  Advised to wear wrist splints at nighttime.  Possibly during the daytime.  Discussed pain attention to ergonomics.  If weakness continues or gets worse she is to contact me for referral for an EMG study.  Also may continue nonsteroidal use.      Followup: No follow-ups on file.    Please note that this dictation was created using voice recognition software. I have made every reasonable attempt to correct obvious errors, but I expect that there are errors of grammar and possibly content that I did not discover before finalizing the note.

## 2020-02-13 NOTE — ASSESSMENT & PLAN NOTE
Also complains of an approximate 3-month history of bilateral lower leg discomfort.  Symptoms worse after running.  Has tried to modify his exercise routine.  Still has some pain on the sides of the legs.

## 2020-02-13 NOTE — ASSESSMENT & PLAN NOTE
This is a 44-year-old male continues to have flares of lower left-sided back pain with sciatica.  Recently seen in urgent care.  Taking nonsteroidals and a muscle relaxer.  Still has some minor pain with pain down the left leg.  The past has had an x-ray but today is requesting an MRI.  Has not had an MRI in the past.  Does exercise routinely.  Has not been effective.  He said symptoms for at least 2 years.

## 2020-02-13 NOTE — ASSESSMENT & PLAN NOTE
Also complains of a couple month history of bilateral wrist and hand pain.  Numbness.  Symptoms occur during the daytime when he is typing as well as at nighttime when he is sleeping.  Pain radiates up the right elbow.  In the past had elbow pain but that was secondary to tennis elbow.  No improvement recently.  Is taking nonsteroidals and Tylenol without any improvement.  Complains of slight weakness of the right hand.

## 2020-05-01 DIAGNOSIS — E78.2 MIXED HYPERLIPIDEMIA: ICD-10-CM

## 2020-05-01 DIAGNOSIS — K21.9 GASTROESOPHAGEAL REFLUX DISEASE, ESOPHAGITIS PRESENCE NOT SPECIFIED: ICD-10-CM

## 2020-05-01 DIAGNOSIS — L65.9 HAIR LOSS: ICD-10-CM

## 2020-05-03 RX ORDER — FINASTERIDE 5 MG/1
TABLET, FILM COATED ORAL
Qty: 30 TAB | Refills: 11 | Status: SHIPPED | OUTPATIENT
Start: 2020-05-03 | End: 2021-05-10

## 2020-05-03 RX ORDER — OMEPRAZOLE 20 MG/1
CAPSULE, DELAYED RELEASE ORAL
Qty: 90 CAP | Refills: 3 | Status: SHIPPED | OUTPATIENT
Start: 2020-05-03 | End: 2020-12-20

## 2020-05-03 RX ORDER — ATORVASTATIN CALCIUM 20 MG/1
TABLET, FILM COATED ORAL
Qty: 90 TAB | Refills: 0 | Status: SHIPPED | OUTPATIENT
Start: 2020-05-03 | End: 2020-07-28

## 2020-05-25 DIAGNOSIS — G47.09 OTHER INSOMNIA: ICD-10-CM

## 2020-05-25 RX ORDER — TRAZODONE HYDROCHLORIDE 50 MG/1
TABLET ORAL
Qty: 90 TAB | Refills: 2 | Status: SHIPPED | OUTPATIENT
Start: 2020-05-25 | End: 2020-09-28

## 2020-05-26 DIAGNOSIS — N52.2 DRUG-INDUCED ERECTILE DYSFUNCTION: ICD-10-CM

## 2020-05-26 RX ORDER — SILDENAFIL 100 MG/1
TABLET, FILM COATED ORAL
Qty: 30 TAB | Refills: 5 | Status: SHIPPED | OUTPATIENT
Start: 2020-05-26 | End: 2020-09-28 | Stop reason: SDUPTHER

## 2020-05-26 NOTE — TELEPHONE ENCOUNTER
Received request via: Pharmacy    Was the patient seen in the last year in this department? Yes    Patients next Appointment:  Visit date not found     Requested Prescriptions     Pending Prescriptions Disp Refills   • sildenafil citrate (VIAGRA) 100 MG tablet 30 Tab 5     Sig: TAKE 1 TAB PO ONCE DAILY 30 MIN PRIOR TO ACTIVITY

## 2020-05-27 RX ORDER — SUMATRIPTAN 50 MG/1
TABLET, FILM COATED ORAL
Qty: 9 TAB | Refills: 5 | Status: SHIPPED | OUTPATIENT
Start: 2020-05-27 | End: 2020-12-20

## 2020-05-27 NOTE — TELEPHONE ENCOUNTER
Received request via: Pharmacy    Was the patient seen in the last year in this department? Yes    Does the patient have an active prescription (recently filled or refills available) for medication(s) requested? No     Requested Prescriptions     Pending Prescriptions Disp Refills   • SUMAtriptan (IMITREX) 50 MG Tab [Pharmacy Med Name: SUMATRIPTAN 50MG TABLETS] 9 Tab 5     Sig: TAKE 1 TABLET BY MOUTH 1 TIME AS NEEDED FOR MIGRAINE. MAY TAKE SECOND TABLET 2 HOURS AFTER IF STILL AT ONSET OF HEADACHE

## 2020-05-30 DIAGNOSIS — F33.1 MAJOR DEPRESSIVE DISORDER, RECURRENT EPISODE, MODERATE (HCC): ICD-10-CM

## 2020-05-31 RX ORDER — BUPROPION HYDROCHLORIDE 300 MG/1
TABLET ORAL
Qty: 90 TAB | Refills: 1 | Status: SHIPPED | OUTPATIENT
Start: 2020-05-31 | End: 2020-11-10

## 2020-07-28 DIAGNOSIS — E78.2 MIXED HYPERLIPIDEMIA: ICD-10-CM

## 2020-07-28 RX ORDER — ATORVASTATIN CALCIUM 20 MG/1
TABLET, FILM COATED ORAL
Qty: 90 TAB | Refills: 0 | Status: SHIPPED | OUTPATIENT
Start: 2020-07-28 | End: 2020-11-10

## 2020-07-28 NOTE — TELEPHONE ENCOUNTER
Received request via: Pharmacy    Was the patient seen in the last year in this department? Yes    Patients next Appointment:  Visit date not found     Requested Prescriptions     Pending Prescriptions Disp Refills   • atorvastatin (LIPITOR) 20 MG Tab [Pharmacy Med Name: ATORVASTATIN 20MG TABLETS] 90 Tab 0     Sig: TAKE 1 TABLET BY MOUTH EVERY DAY

## 2020-08-18 DIAGNOSIS — R42 VERTIGO: ICD-10-CM

## 2020-08-18 RX ORDER — MECLIZINE HYDROCHLORIDE 25 MG/1
TABLET ORAL
Qty: 60 TAB | Refills: 1 | Status: SHIPPED | OUTPATIENT
Start: 2020-08-18 | End: 2021-07-30

## 2020-09-28 ENCOUNTER — TELEMEDICINE (OUTPATIENT)
Dept: MEDICAL GROUP | Facility: MEDICAL CENTER | Age: 45
End: 2020-09-28
Payer: OTHER GOVERNMENT

## 2020-09-28 VITALS — RESPIRATION RATE: 16 BRPM | HEIGHT: 72 IN | WEIGHT: 207 LBS | BODY MASS INDEX: 28.04 KG/M2

## 2020-09-28 DIAGNOSIS — Z00.00 PREVENTATIVE HEALTH CARE: ICD-10-CM

## 2020-09-28 DIAGNOSIS — G47.09 OTHER INSOMNIA: ICD-10-CM

## 2020-09-28 DIAGNOSIS — N52.2 DRUG-INDUCED ERECTILE DYSFUNCTION: ICD-10-CM

## 2020-09-28 DIAGNOSIS — M62.838 TRAPEZIUS MUSCLE SPASM: ICD-10-CM

## 2020-09-28 PROCEDURE — 99214 OFFICE O/P EST MOD 30 MIN: CPT | Mod: CR | Performed by: PHYSICIAN ASSISTANT

## 2020-09-28 RX ORDER — ZOLPIDEM TARTRATE 5 MG/1
5 TABLET ORAL NIGHTLY PRN
Qty: 30 TAB | Refills: 0 | Status: SHIPPED | OUTPATIENT
Start: 2020-09-28 | End: 2020-10-29 | Stop reason: SDUPTHER

## 2020-09-28 RX ORDER — CYCLOBENZAPRINE HCL 10 MG
10 TABLET ORAL 3 TIMES DAILY PRN
Qty: 60 TAB | Refills: 5 | Status: SHIPPED | OUTPATIENT
Start: 2020-09-28 | End: 2021-11-05 | Stop reason: SDUPTHER

## 2020-09-28 RX ORDER — SILDENAFIL 100 MG/1
TABLET, FILM COATED ORAL
Qty: 9 TAB | Refills: 11 | Status: SHIPPED | OUTPATIENT
Start: 2020-09-28 | End: 2021-11-05 | Stop reason: SDUPTHER

## 2020-09-28 ASSESSMENT — FIBROSIS 4 INDEX: FIB4 SCORE: 0.58

## 2020-09-28 NOTE — ASSESSMENT & PLAN NOTE
Chronic condition.  States that insurance does not cover more than 9 tablets a month.  9 tablets is sufficient.  He will cut the dose in half.  Requesting a refill.

## 2020-09-28 NOTE — ASSESSMENT & PLAN NOTE
This is a 45-year-old male on a virtual visit appointment today.  Has a chronic history of insomnia.  Was taking trazodone for a while but medication has not been effective the past couple months.  He states he was initially cutting the medication dose in half.  Been taking the full 50 mg.  States he wakes up multiple times at night.  Also does not wake up feeling refreshed.  Feels groggy.  He is requesting Ambien.  States that a coworker has taken and wakes up feeling refreshed and vibrant.  He would like to feel the same way.

## 2020-09-28 NOTE — PROGRESS NOTES
Subjective:   Mike Rios is a 45 y.o. male here today for insomnia, ED and preventative health care.    This evaluation was conducted via Zoom using secure and encrypted videoconferencing technology. The patient was in a private location in the state [facility].    The patient's identity was confirmed and verbal consent was obtained for this virtual visit.      Other insomnia  This is a 45-year-old male on a virtual visit appointment today.  Has a chronic history of insomnia.  Was taking trazodone for a while but medication has not been effective the past couple months.  He states he was initially cutting the medication dose in half.  Been taking the full 50 mg.  States he wakes up multiple times at night.  Also does not wake up feeling refreshed.  Feels groggy.  He is requesting Ambien.  States that a coworker has taken and wakes up feeling refreshed and vibrant.  He would like to feel the same way.    Drug-induced erectile dysfunction  Chronic condition.  States that insurance does not cover more than 9 tablets a month.  9 tablets is sufficient.  He will cut the dose in half.  Requesting a refill.       Current medicines (including changes today)  Current Outpatient Medications   Medication Sig Dispense Refill   • zolpidem (AMBIEN) 5 MG Tab Take 1 Tab by mouth at bedtime as needed for Sleep for up to 30 days. 30 Tab 0   • sildenafil citrate (VIAGRA) 100 MG tablet TAKE 1 TAB PO ONCE DAILY 30 MIN PRIOR TO ACTIVITY 9 Tab 11   • cyclobenzaprine (FLEXERIL) 10 MG Tab Take 1 Tab by mouth 3 times a day as needed. 60 Tab 5   • meclizine (ANTIVERT) 25 MG Tab TAKE 1 TABLET BY MOUTH THREE TIMES DAILY AS NEEDED FOR VERTIGO 60 Tab 1   • atorvastatin (LIPITOR) 20 MG Tab TAKE 1 TABLET BY MOUTH EVERY DAY 90 Tab 0   • buPROPion (WELLBUTRIN XL) 300 MG XL tablet TAKE 1 TABLET BY MOUTH EVERY DAY IN THE MORNING 90 Tab 1   • SUMAtriptan (IMITREX) 50 MG Tab TAKE 1 TABLET BY MOUTH 1 TIME AS NEEDED FOR MIGRAINE. MAY TAKE SECOND  TABLET 2 HOURS AFTER IF STILL AT ONSET OF HEADACHE 9 Tab 5   • finasteride (PROSCAR) 5 MG Tab TAKE 1/4 TABLET BY MOUTH ONCE DAILY 30 Tab 11   • omeprazole (PRILOSEC) 20 MG delayed-release capsule TAKE 1 CAPSULE BY MOUTH ONCE DAILY 30 MINUTES PRIOR TO MEAL 90 Cap 3   • sertraline (ZOLOFT) 50 MG Tab TAKE 1 AND 1/2 TABLETS BY MOUTH EVERY  Tab 3   • albuterol 108 (90 Base) MCG/ACT Aero Soln inhalation aerosol Inhale 2 Puffs by mouth every four hours as needed. 1 Inhaler 0   • azelastine (ASTELIN) 137 MCG/SPRAY nasal spray [place] 2 Sprays in nose 2 Times a Day.  4   • DYMISTA 137-50 MCG/ACT Suspension Spray 2 Sprays in nose every day.  5   • ibuprofen (MOTRIN) 200 MG Tab Take 200 mg by mouth every 6 hours as needed.     • diclofenac EC (VOLTAREN) 75 MG Tablet Delayed Response Take 1 Tab by mouth 2 times a day. 60 Tab 0     No current facility-administered medications for this visit.      He  has a past medical history of Back pain, Central sleep apnea, Depression, and FLIP (obstructive sleep apnea). He also has no past medical history of ASTHMA, Diabetes, Seizure (HCC), or Ulcer.    Social History and Family History were reviewed and updated.    ROS   No chest pain, no shortness of breath, no abdominal pain and all other systems were reviewed and are negative.       Objective:     Resp 16   Ht 1.829 m (6')   Wt 93.9 kg (207 lb)  Body mass index is 28.07 kg/m².   Physical Exam:  Constitutional: Alert, no distress.  Skin: Warm, dry, good turgor, no rashes in visible areas.  Eye: Equal, round and reactive, conjunctiva clear, lids normal.  ENMT: Lips without lesions, good dentition, oropharynx clear.  Neck: Trachea midline, no masses.   Lymph: No cervical or supraclavicular lymphadenopathy  Respiratory: Unlabored respiratory effort.  Psych: Alert and oriented x3, normal affect and mood.        Assessment and Plan:   The following treatment plan was discussed    1. Other insomnia  Chronic condition.  Trazodone no  longer effective.  Advised other noncontrolled medications but he would like to try Ambien.  We will start with a low dose at 5 mg.  Sent medication electronically to pharmacy.   reviewed.  Discussed possibility of becoming habit-forming.  Discussed side effects as well.  I will see him in 1 month.  - zolpidem (AMBIEN) 5 MG Tab; Take 1 Tab by mouth at bedtime as needed for Sleep for up to 30 days.  Dispense: 30 Tab; Refill: 0    2. Drug-induced erectile dysfunction  Chronic condition.  Stable.  Renewed sildenafil 100 mg.  Sent over 90 tablets monthly.  Refills.  - sildenafil citrate (VIAGRA) 100 MG tablet; TAKE 1 TAB PO ONCE DAILY 30 MIN PRIOR TO ACTIVITY  Dispense: 9 Tab; Refill: 11    3. Preventative health care  Order labs fasting.  He will be contacted with the results.  - HEMOGLOBIN A1C; Future  - Comp Metabolic Panel; Future  - VITAMIN D,25 HYDROXY; Future  - Lipid Profile; Future      Followup: Return in about 4 weeks (around 10/26/2020), or if symptoms worsen or fail to improve.    Please note that this dictation was created using voice recognition software. I have made every reasonable attempt to correct obvious errors, but I expect that there are errors of grammar and possibly content that I did not discover before finalizing the note.

## 2020-10-02 ENCOUNTER — TELEMEDICINE (OUTPATIENT)
Dept: MEDICAL GROUP | Facility: MEDICAL CENTER | Age: 45
End: 2020-10-02
Payer: OTHER GOVERNMENT

## 2020-10-02 VITALS — WEIGHT: 207 LBS | RESPIRATION RATE: 16 BRPM | HEIGHT: 72 IN | BODY MASS INDEX: 28.04 KG/M2

## 2020-10-02 DIAGNOSIS — F41.1 GENERALIZED ANXIETY DISORDER: ICD-10-CM

## 2020-10-02 DIAGNOSIS — F33.1 MAJOR DEPRESSIVE DISORDER, RECURRENT EPISODE, MODERATE (HCC): ICD-10-CM

## 2020-10-02 PROCEDURE — 99213 OFFICE O/P EST LOW 20 MIN: CPT | Mod: 95,CR | Performed by: PHYSICIAN ASSISTANT

## 2020-10-02 ASSESSMENT — FIBROSIS 4 INDEX: FIB4 SCORE: 0.58

## 2020-10-02 NOTE — PROGRESS NOTES
Subjective:   Mike Rios is a 45 y.o. male here today for depression and anxiety.    This evaluation was conducted via Zoom using secure and encrypted videoconferencing technology. The patient was in a private location in the state [facility].    The patient's identity was confirmed and verbal consent was obtained for this virtual visit.      Major depressive disorder, recurrent episode, moderate (CMS-HCC)  This is a 45-year-old male here today on a virtual visit to discuss his stress levels.  History of anxiety and depression.  Currently on Wellbutrin 300 mg extended release and Zoloft 75 mg daily.  He is requesting a referral to see psychiatry.  He would like to discuss medications with them.  States his job requires lots of people that oversee his duties.  He states that his job is not shut off when he leaves his office.  In the past he denies any homicidal or suicidal ideations.  I had referred him last year to behavioral health for psychology evaluation.  He was never contacted.       Current medicines (including changes today)  Current Outpatient Medications   Medication Sig Dispense Refill   • zolpidem (AMBIEN) 5 MG Tab Take 1 Tab by mouth at bedtime as needed for Sleep for up to 30 days. 30 Tab 0   • sildenafil citrate (VIAGRA) 100 MG tablet TAKE 1 TAB PO ONCE DAILY 30 MIN PRIOR TO ACTIVITY 9 Tab 11   • cyclobenzaprine (FLEXERIL) 10 MG Tab Take 1 Tab by mouth 3 times a day as needed. 60 Tab 5   • meclizine (ANTIVERT) 25 MG Tab TAKE 1 TABLET BY MOUTH THREE TIMES DAILY AS NEEDED FOR VERTIGO 60 Tab 1   • atorvastatin (LIPITOR) 20 MG Tab TAKE 1 TABLET BY MOUTH EVERY DAY 90 Tab 0   • buPROPion (WELLBUTRIN XL) 300 MG XL tablet TAKE 1 TABLET BY MOUTH EVERY DAY IN THE MORNING 90 Tab 1   • SUMAtriptan (IMITREX) 50 MG Tab TAKE 1 TABLET BY MOUTH 1 TIME AS NEEDED FOR MIGRAINE. MAY TAKE SECOND TABLET 2 HOURS AFTER IF STILL AT ONSET OF HEADACHE 9 Tab 5   • finasteride (PROSCAR) 5 MG Tab TAKE 1/4 TABLET BY MOUTH  ONCE DAILY 30 Tab 11   • omeprazole (PRILOSEC) 20 MG delayed-release capsule TAKE 1 CAPSULE BY MOUTH ONCE DAILY 30 MINUTES PRIOR TO MEAL 90 Cap 3   • sertraline (ZOLOFT) 50 MG Tab TAKE 1 AND 1/2 TABLETS BY MOUTH EVERY  Tab 3   • albuterol 108 (90 Base) MCG/ACT Aero Soln inhalation aerosol Inhale 2 Puffs by mouth every four hours as needed. 1 Inhaler 0   • azelastine (ASTELIN) 137 MCG/SPRAY nasal spray [place] 2 Sprays in nose 2 Times a Day.  4   • DYMISTA 137-50 MCG/ACT Suspension Spray 2 Sprays in nose every day.  5   • ibuprofen (MOTRIN) 200 MG Tab Take 200 mg by mouth every 6 hours as needed.     • diclofenac EC (VOLTAREN) 75 MG Tablet Delayed Response Take 1 Tab by mouth 2 times a day. 60 Tab 0     No current facility-administered medications for this visit.      He  has a past medical history of Back pain, Central sleep apnea, Depression, and FLIP (obstructive sleep apnea). He also has no past medical history of ASTHMA, Diabetes, Seizure (HCC), or Ulcer.    Social History and Family History were reviewed and updated.    ROS   No chest pain, no shortness of breath, no abdominal pain and all other systems were reviewed and are negative.       Objective:     Resp 16   Ht 1.829 m (6')   Wt 93.9 kg (207 lb)  Body mass index is 28.07 kg/m².   Physical Exam:  Constitutional: Alert, no distress.  Skin: Warm, dry, good turgor, no rashes in visible areas.  Eye: Equal, round and reactive, conjunctiva clear, lids normal.  ENMT: Lips without lesions, good dentition, oropharynx clear.  Neck: Trachea midline, no masses.   Lymph: No cervical or supraclavicular lymphadenopathy  Respiratory: Unlabored respiratory effort.  Psych: Alert and oriented x3, normal affect and mood.        Assessment and Plan:   The following treatment plan was discussed    1. Major depressive disorder, recurrent episode, moderate (HCC)  Chronic condition.  Stable.  Continue medications as directed.  Offered to increase Zoloft but he declined.   May contact me through my chart with any concerns.  Will refer him to psychiatry.  He will review 's network handbook to see which psychiatrist he would like to see in his network.  He will then contact me through my chart.    2. LAKHWINDER (generalized anxiety disorder)  Chronic condition.  Stable.  Continue medications as directed.  Refer to psychiatry.      Followup: Return if symptoms worsen or fail to improve.    Please note that this dictation was created using voice recognition software. I have made every reasonable attempt to correct obvious errors, but I expect that there are errors of grammar and possibly content that I did not discover before finalizing the note.

## 2020-10-02 NOTE — ASSESSMENT & PLAN NOTE
This is a 45-year-old male here today on a virtual visit to discuss his stress levels.  History of anxiety and depression.  Currently on Wellbutrin 300 mg extended release and Zoloft 75 mg daily.  He is requesting a referral to see psychiatry.  He would like to discuss medications with them.  States his job requires lots of people that oversee his duties.  He states that his job is not shut off when he leaves his office.  In the past he denies any homicidal or suicidal ideations.  I had referred him last year to behavioral health for psychology evaluation.  He was never contacted.

## 2020-10-29 ENCOUNTER — TELEMEDICINE (OUTPATIENT)
Dept: MEDICAL GROUP | Facility: MEDICAL CENTER | Age: 45
End: 2020-10-29
Payer: OTHER GOVERNMENT

## 2020-10-29 VITALS — HEIGHT: 72 IN | RESPIRATION RATE: 16 BRPM | WEIGHT: 207 LBS | BODY MASS INDEX: 28.04 KG/M2

## 2020-10-29 DIAGNOSIS — M25.532 PAIN IN BOTH WRISTS: ICD-10-CM

## 2020-10-29 DIAGNOSIS — M25.521 RIGHT ELBOW PAIN: ICD-10-CM

## 2020-10-29 DIAGNOSIS — M25.531 PAIN IN BOTH WRISTS: ICD-10-CM

## 2020-10-29 DIAGNOSIS — G47.33 OBSTRUCTIVE SLEEP APNEA: Chronic | ICD-10-CM

## 2020-10-29 DIAGNOSIS — F43.12 CHRONIC POST-TRAUMATIC STRESS DISORDER (PTSD): ICD-10-CM

## 2020-10-29 DIAGNOSIS — G47.09 OTHER INSOMNIA: ICD-10-CM

## 2020-10-29 DIAGNOSIS — M25.522 LEFT ELBOW PAIN: ICD-10-CM

## 2020-10-29 PROBLEM — F41.1 GENERALIZED ANXIETY DISORDER: Status: RESOLVED | Noted: 2017-07-12 | Resolved: 2020-10-29

## 2020-10-29 PROBLEM — R04.0 EPISTAXIS: Status: RESOLVED | Noted: 2019-08-29 | Resolved: 2020-10-29

## 2020-10-29 PROCEDURE — 99214 OFFICE O/P EST MOD 30 MIN: CPT | Mod: GT,CR | Performed by: PHYSICIAN ASSISTANT

## 2020-10-29 RX ORDER — ZOLPIDEM TARTRATE 5 MG/1
5 TABLET ORAL NIGHTLY PRN
Qty: 30 TAB | Refills: 0 | Status: SHIPPED | OUTPATIENT
Start: 2020-10-29 | End: 2021-01-29 | Stop reason: SDUPTHER

## 2020-10-29 RX ORDER — LORAZEPAM 0.5 MG/1
TABLET ORAL 3 TIMES DAILY PRN
COMMUNITY
Start: 2020-10-19 | End: 2021-07-30 | Stop reason: SDUPTHER

## 2020-10-29 RX ORDER — QUETIAPINE FUMARATE 25 MG/1
TABLET, FILM COATED ORAL
COMMUNITY
Start: 2020-10-23 | End: 2021-07-08 | Stop reason: SDUPTHER

## 2020-10-29 RX ORDER — PRAZOSIN HYDROCHLORIDE 1 MG/1
CAPSULE ORAL
COMMUNITY
Start: 2020-10-19 | End: 2021-01-29

## 2020-10-29 RX ORDER — SERTRALINE HYDROCHLORIDE 100 MG/1
TABLET, FILM COATED ORAL
COMMUNITY
Start: 2020-10-16 | End: 2021-07-30

## 2020-10-29 ASSESSMENT — FIBROSIS 4 INDEX: FIB4 SCORE: 0.58

## 2020-10-29 NOTE — ASSESSMENT & PLAN NOTE
Chronic condition.  Has a CPAP machine since 2015.  Would like to follow with pulmonology.  In the past saw Dr. Martinez.

## 2020-10-29 NOTE — ASSESSMENT & PLAN NOTE
States that he is currently being treated for PTSD.  He had thought that he had social anxiety disorder but she told him that he had PTSD.  He is currently on prazosin for his nightmares.  His Zoloft was increased to 100 mg.  He is also on Seroquel.  Denies any side effects with his medications.  Still taking Wellbutrin for depression.  Also was given a prescription for lorazepam from his psychiatrist.  Will take the medication as needed.

## 2020-10-29 NOTE — PROGRESS NOTES
Subjective:   Mike Rios is a 45 y.o. male here today for insomnia, PTSD, sleep apnea and bilateral wrist pain.    This evaluation was conducted via Zoom using secure and encrypted videoconferencing technology. The patient was in a private location in the [facility].    The patient's identity was confirmed and verbal consent was obtained for this virtual visit.      Other insomnia  This is a 45-year-old male on a virtual visit today.  Here today to discuss his chronic history of insomnia.  Is currently followed by psychiatry.  Has had 2 appointments.  Next appointment next week.  She is aware that he takes Ambien 5 mg every other day.  He is requesting a refill today.  States the medication has been very effective.  He wakes up feeling refreshed.  Does not wake up during the night.    Chronic post-traumatic stress disorder (PTSD)  States that he is currently being treated for PTSD.  He had thought that he had social anxiety disorder but she told him that he had PTSD.  He is currently on prazosin for his nightmares.  His Zoloft was increased to 100 mg.  He is also on Seroquel.  Denies any side effects with his medications.  Still taking Wellbutrin for depression.  Also was given a prescription for lorazepam from his psychiatrist.  Will take the medication as needed.    Obstructive sleep apnea  Chronic condition.  Has a CPAP machine since 2015.  Would like to follow with pulmonology.  In the past saw Dr. Martinez.    Pain in both wrists  Chronic history of bilateral wrist pain.  Also elbow pain.  In the past we discussed possible carpal tunnel.  He currently states that he has right sided thenar eminence pain.  Numbness as well in the first 3 fingers of his right hand.       Current medicines (including changes today)  Current Outpatient Medications   Medication Sig Dispense Refill   • zolpidem (AMBIEN) 5 MG Tab Take 1 Tab by mouth at bedtime as needed for Sleep for up to 30 days. 30 Tab 0   • LORazepam  (ATIVAN) 0.5 MG Tab Take  by mouth 3 times a day as needed. Take 1 tablet by mouth three times a day as needed     • prazosin (MINIPRESS) 1 MG Cap TK 1-2 CS PO ONCE D HS PRN FOR NIGHTMARES.     • QUEtiapine (SEROQUEL) 25 MG Tab      • sertraline (ZOLOFT) 100 MG Tab      • sildenafil citrate (VIAGRA) 100 MG tablet TAKE 1 TAB PO ONCE DAILY 30 MIN PRIOR TO ACTIVITY 9 Tab 11   • cyclobenzaprine (FLEXERIL) 10 MG Tab Take 1 Tab by mouth 3 times a day as needed. 60 Tab 5   • meclizine (ANTIVERT) 25 MG Tab TAKE 1 TABLET BY MOUTH THREE TIMES DAILY AS NEEDED FOR VERTIGO 60 Tab 1   • atorvastatin (LIPITOR) 20 MG Tab TAKE 1 TABLET BY MOUTH EVERY DAY 90 Tab 0   • buPROPion (WELLBUTRIN XL) 300 MG XL tablet TAKE 1 TABLET BY MOUTH EVERY DAY IN THE MORNING 90 Tab 1   • SUMAtriptan (IMITREX) 50 MG Tab TAKE 1 TABLET BY MOUTH 1 TIME AS NEEDED FOR MIGRAINE. MAY TAKE SECOND TABLET 2 HOURS AFTER IF STILL AT ONSET OF HEADACHE 9 Tab 5   • finasteride (PROSCAR) 5 MG Tab TAKE 1/4 TABLET BY MOUTH ONCE DAILY 30 Tab 11   • omeprazole (PRILOSEC) 20 MG delayed-release capsule TAKE 1 CAPSULE BY MOUTH ONCE DAILY 30 MINUTES PRIOR TO MEAL 90 Cap 3   • albuterol 108 (90 Base) MCG/ACT Aero Soln inhalation aerosol Inhale 2 Puffs by mouth every four hours as needed. 1 Inhaler 0   • azelastine (ASTELIN) 137 MCG/SPRAY nasal spray [place] 2 Sprays in nose 2 Times a Day.  4   • DYMISTA 137-50 MCG/ACT Suspension Spray 2 Sprays in nose every day.  5   • ibuprofen (MOTRIN) 200 MG Tab Take 200 mg by mouth every 6 hours as needed.     • diclofenac EC (VOLTAREN) 75 MG Tablet Delayed Response Take 1 Tab by mouth 2 times a day. 60 Tab 0     No current facility-administered medications for this visit.      He  has a past medical history of Back pain, Central sleep apnea, Depression, and FLIP (obstructive sleep apnea). He also has no past medical history of ASTHMA, Diabetes, Seizure (HCC), or Ulcer.    Social History and Family History were reviewed and updated.    ROS    No chest pain, no shortness of breath, no abdominal pain and all other systems were reviewed and are negative.       Objective:     Resp 16   Ht 1.829 m (6')   Wt 93.9 kg (207 lb)  Body mass index is 28.07 kg/m².   Physical Exam:  Constitutional: Alert, no distress.  Skin: Warm, dry, good turgor, no rashes in visible areas.  Eye: Equal, round and reactive, conjunctiva clear, lids normal.  ENMT: Lips without lesions, good dentition, oropharynx clear.  Neck: Trachea midline, no masses.   Lymph: No cervical or supraclavicular lymphadenopathy  Respiratory: Unlabored respiratory effort.  Psych: Alert and oriented x3, normal affect and mood.        Assessment and Plan:   The following treatment plan was discussed    1. Other insomnia  Chronic condition.  Stable.   reviewed.  Psychiatrist is aware that he is taking Ambien as needed for insomnia.  Renewed medication.   reviewed.  - zolpidem (AMBIEN) 5 MG Tab; Take 1 Tab by mouth at bedtime as needed for Sleep for up to 30 days.  Dispense: 30 Tab; Refill: 0    2. Chronic post-traumatic stress disorder (PTSD)  New condition noted in chart but chronic.  Continue to follow with psychiatry.  Reviewed medications.  Updated list.    3. Obstructive sleep apnea  Chronic condition.  Stable.  Continue CPAP use.  Referred to pulmonology and sleep medicine.  - REFERRAL TO PULMONARY AND SLEEP MEDICINE Sleep Medicine    4. Pain in both wrists  Chronic condition.  Symptoms appear to be secondary to carpal tunnel.  Refer to the [place] [facility] and Dr. Willams.  - REFERRAL TO HAND SURGERY    5. Right elbow pain  Chronic condition.  Unknown cause.  Could be secondary to carpal tunnel.  Refer to hand surgery.  - REFERRAL TO HAND SURGERY    6. Left elbow pain  Chronic condition.  Likely secondary to carpal tunnel.  Referred to hand surgery.  - REFERRAL TO HAND SURGERY      Followup: Return in about 3 months (around 1/29/2021), or if symptoms worsen or fail to improve.    Please  note that this dictation was created using voice recognition software. I have made every reasonable attempt to correct obvious errors, but I expect that there are errors of grammar and possibly content that I did not discover before finalizing the note.

## 2020-10-29 NOTE — ASSESSMENT & PLAN NOTE
This is a 45-year-old male on a virtual visit today.  Here today to discuss his chronic history of insomnia.  Is currently followed by psychiatry.  Has had 2 appointments.  Next appointment next week.  She is aware that he takes Ambien 5 mg every other day.  He is requesting a refill today.  States the medication has been very effective.  He wakes up feeling refreshed.  Does not wake up during the night.

## 2020-10-29 NOTE — ASSESSMENT & PLAN NOTE
Chronic history of bilateral wrist pain.  Also elbow pain.  In the past we discussed possible carpal tunnel.  He currently states that he has right sided thenar eminence pain.  Numbness as well in the first 3 fingers of his right hand.

## 2020-12-19 DIAGNOSIS — K21.9 GASTROESOPHAGEAL REFLUX DISEASE: ICD-10-CM

## 2020-12-20 RX ORDER — SUMATRIPTAN 50 MG/1
TABLET, FILM COATED ORAL
Qty: 9 TAB | Refills: 5 | Status: SHIPPED | OUTPATIENT
Start: 2020-12-20 | End: 2021-08-26

## 2020-12-20 RX ORDER — OMEPRAZOLE 20 MG/1
CAPSULE, DELAYED RELEASE ORAL
Qty: 270 CAP | Refills: 1 | Status: SHIPPED | OUTPATIENT
Start: 2020-12-20 | End: 2022-02-28

## 2021-01-29 ENCOUNTER — TELEMEDICINE (OUTPATIENT)
Dept: MEDICAL GROUP | Facility: MEDICAL CENTER | Age: 46
End: 2021-01-29
Payer: OTHER GOVERNMENT

## 2021-01-29 VITALS — WEIGHT: 207 LBS | BODY MASS INDEX: 28.04 KG/M2 | RESPIRATION RATE: 16 BRPM | HEIGHT: 72 IN

## 2021-01-29 DIAGNOSIS — F33.1 MAJOR DEPRESSIVE DISORDER, RECURRENT EPISODE, MODERATE (HCC): ICD-10-CM

## 2021-01-29 DIAGNOSIS — G56.03 CARPAL TUNNEL SYNDROME, BILATERAL: ICD-10-CM

## 2021-01-29 DIAGNOSIS — H04.123 BILATERAL DRY EYES: ICD-10-CM

## 2021-01-29 DIAGNOSIS — G47.09 OTHER INSOMNIA: ICD-10-CM

## 2021-01-29 DIAGNOSIS — R73.03 PREDIABETES: ICD-10-CM

## 2021-01-29 PROBLEM — M25.522 LEFT ELBOW PAIN: Status: RESOLVED | Noted: 2019-11-25 | Resolved: 2021-01-29

## 2021-01-29 PROBLEM — M25.521 RIGHT ELBOW PAIN: Status: RESOLVED | Noted: 2019-11-25 | Resolved: 2021-01-29

## 2021-01-29 PROCEDURE — 99214 OFFICE O/P EST MOD 30 MIN: CPT | Mod: GT,CR | Performed by: PHYSICIAN ASSISTANT

## 2021-01-29 RX ORDER — ZOLPIDEM TARTRATE 5 MG/1
5 TABLET ORAL NIGHTLY PRN
Qty: 30 TAB | Refills: 1 | Status: SHIPPED | OUTPATIENT
Start: 2021-01-29 | End: 2021-05-12

## 2021-01-29 RX ORDER — PRAZOSIN HYDROCHLORIDE 2 MG/1
CAPSULE ORAL
COMMUNITY
Start: 2021-01-17 | End: 2021-10-11

## 2021-01-29 ASSESSMENT — FIBROSIS 4 INDEX: FIB4 SCORE: 0.58

## 2021-01-29 NOTE — ASSESSMENT & PLAN NOTE
This is a pleasant 45-year-old male who is here today in a virtual visit.  [facility] bil[facility].  Would like a prescription for some eyedrops.  He was unable to procure them up in [place].  He is currently in town.

## 2021-01-29 NOTE — ASSESSMENT & PLAN NOTE
Chronic condition.  Doing well.  Taking his medications as directed.  Follow with behavioral health.

## 2021-01-29 NOTE — ASSESSMENT & PLAN NOTE
During his September visit I did order labs to check his status of prediabetes, cholesterol and other preventative labs but he has yet to perform them.  May perform them while in [place].

## 2021-01-29 NOTE — ASSESSMENT & PLAN NOTE
Seen today by another specialist and was diagnosed once again with bilateral carpal tunnel.  Has wrist and elbow pain.  Is not interested in surgical intervention.  We will postpone it to possibly after the .   67 year old non-compliant M with PMHx HTN, Hyperlipidemia, DM Type II, h/o CVA in 2007 & 8/2017 without residual deficit who presented to cardiologist Dr. Jerry c/o AVTAR upon ambulation of <1 city block over past year. In light of pt's risk factors, CCS Class III Equivalent Sx, pt referred for cardiac cath with possible intervention to r/o underlying CAD.     Pt denies bleeding, GI bleeding, hematemesis, hematuria, BRBPR or melena . Took ASA 81 mg on 11/11/18 and reports daily compliance. Will provide ASA 81 mg x1 and Plavix 600 mg x1 pre-cath fluids.   IV NS@ 75 cc/hr pre-cath.  Pre-cath consented using [employer]  # [id number]  Sedation Plan: Moderate  Patient is Suitable Candidate for Sedation: Yes  Risks & benefits of procedure and alternative therapy have been explained to the patient including but not limited to: allergic reaction, bleeding w/possible need for blood transfusion, infection, renal and vascular compromise, limb damage, arrhythmia, stroke, vessel dissection/perforation, Myocardial infarction, emergent CABG. Informed consent obtained and in chart

## 2021-01-29 NOTE — PROGRESS NOTES
Subjective:   Mike Rios is a 45 y.o. male here today for dry eyes, carpal tunnel, depression, prediabetes and preventative health care.    This evaluation was conducted via Zoom using secure and encrypted videoconferencing technology. The patient was in a private location in the [facility].    The patient's identity was confirmed and verbal consent was obtained for this virtual visit.      Bilateral dry eyes  This is a pleasant 45-year-old male who is here today in a virtual visit.  [facility].  Would like a prescription for some eyedrops.  He was unable to procure them up in [place].  He is currently in town.    Carpal tunnel syndrome, bilateral  Seen today by another specialist and was diagnosed once again with bilateral carpal tunnel.  Has wrist and elbow pain.  Is not interested in surgical intervention.  We will postpone it to possibly after the .    Major depressive disorder, recurrent episode, moderate (CMS-HCC)  Chronic condition.  Doing well.  Taking his medications as directed.  Follow with behavioral health.    Prediabetes  During his September visit I did order labs to check his status of prediabetes, cholesterol and other preventative labs but he has yet to perform them.  May perform them while in [place].       Current medicines (including changes today)  Current Outpatient Medications   Medication Sig Dispense Refill   • Hypromell-Glycerin-Naphazoline (CLEAR EYES FOR DRY EYES PLUS) 0.8-0.25-0.012 % Solution Administer 1 Drop into affected eye(s) every four hours as needed (Bilateral). 30 mL 11   • zolpidem (AMBIEN) 5 MG Tab Take 1 Tab by mouth at bedtime as needed for Sleep for up to 30 days. 30 Tab 1   • prazosin (MINIPRESS) 2 MG Cap TAKE 1-2 CAPSULES BY MOUTH ONCE DAILY AT BEDTIME AS NEEDED FOR NIGHTMARES.     • SUMAtriptan (IMITREX) 50 MG Tab TAKE 1 TABLET BY MOUTH 1 TIME AS NEEDED FOR MIGRAINE. MAY TAKE SECOND TABLET  2 HOURS AFTER IF STILL AT ONSET OF HEADACHE 9 Tab 5   • omeprazole (PRILOSEC) 20 MG delayed-release capsule TAKE 1 CAPSULE BY MOUTH EVERY DAY 30 MINUTES BEFORE MEAL 270 Cap 1   • buPROPion (WELLBUTRIN XL) 300 MG XL tablet TAKE 1 TABLET BY MOUTH EVERY DAY IN THE MORNING 90 Tab 1   • atorvastatin (LIPITOR) 20 MG Tab TAKE 1 TABLET BY MOUTH EVERY DAY 90 Tab 1   • LORazepam (ATIVAN) 0.5 MG Tab Take  by mouth 3 times a day as needed. Take 1 tablet by mouth three times a day as needed     • QUEtiapine (SEROQUEL) 25 MG Tab      • sertraline (ZOLOFT) 100 MG Tab      • sildenafil citrate (VIAGRA) 100 MG tablet TAKE 1 TAB PO ONCE DAILY 30 MIN PRIOR TO ACTIVITY 9 Tab 11   • cyclobenzaprine (FLEXERIL) 10 MG Tab Take 1 Tab by mouth 3 times a day as needed. 60 Tab 5   • meclizine (ANTIVERT) 25 MG Tab TAKE 1 TABLET BY MOUTH THREE TIMES DAILY AS NEEDED FOR VERTIGO 60 Tab 1   • finasteride (PROSCAR) 5 MG Tab TAKE 1/4 TABLET BY MOUTH ONCE DAILY 30 Tab 11   • albuterol 108 (90 Base) MCG/ACT Aero Soln inhalation aerosol Inhale 2 Puffs by mouth every four hours as needed. 1 Inhaler 0   • azelastine (ASTELIN) 137 MCG/SPRAY nasal spray [place] 2 Sprays in nose 2 Times a Day.  4   • DYMISTA 137-50 MCG/ACT Suspension Spray 2 Sprays in nose every day.  5   • ibuprofen (MOTRIN) 200 MG Tab Take 200 mg by mouth every 6 hours as needed.     • diclofenac EC (VOLTAREN) 75 MG Tablet Delayed Response Take 1 Tab by mouth 2 times a day. 60 Tab 0     No current facility-administered medications for this visit.      He  has a past medical history of Back pain, Central sleep apnea, Depression, and FLIP (obstructive sleep apnea). He also has no past medical history of ASTHMA, Diabetes, Seizure (HCC), or Ulcer.    Social History and Family History were reviewed and updated.    ROS   No chest pain, no shortness of breath, no abdominal pain and all other systems were reviewed and are negative.       Objective:     Resp 16   Ht 1.829 m (6')   Wt 93.9 kg (207  lb)  Body mass index is 28.07 kg/m².   Physical Exam:  Constitutional: Alert, no distress.  Skin: Warm, dry, good turgor, no rashes in visible areas.  Eye: Equal, round and reactive, conjunctiva clear, lids normal.  ENMT: Lips without lesions, good dentition, oropharynx clear.  Neck: Trachea midline, no masses.   Lymph: No cervical or supraclavicular lymphadenopathy  Respiratory: Unlabored respiratory effort.  Psych: Alert and oriented x3, normal affect and mood.        Assessment and Plan:   The following treatment plan was discussed    1. Bilateral dry eyes  New condition noted in chart and likely chronic.  [facility]  Sent over a prescription for Clear Eyes for dry eyes plus.  Unknown if medication is at his pharmacy.  Asked for possible substitute.  - Hypromell-Glycerin-Naphazoline (CLEAR EYES FOR DRY EYES PLUS) 0.8-0.25-0.012 % Solution; Administer 1 Drop into affected eye(s) every four hours as needed (Bilateral).  Dispense: 30 mL; Refill: 11    2. Carpal tunnel syndrome, bilateral  Chronic condition.  Symptomatic.  Seen by specialist urging surgery patient will wait.    3. Other insomnia  Chronic condition.  Stable.   reviewed.  Medication appropriate.  Renewed Ambien 5 mg.  Electronically sent over a 30-day supply with 1 refill to his pharmacy.  - zolpidem (AMBIEN) 5 MG Tab; Take 1 Tab by mouth at bedtime as needed for Sleep for up to 30 days.  Dispense: 30 Tab; Refill: 1    4. Major depressive disorder, recurrent episode, moderate (HCC)  Chronic condition.  Stable.  Follow with behavioral health.  Continue Wellbutrin as directed.    5. Prediabetes  Prior condition.  Status unknown.  Urged to obtain labs.  Orders are in the system.  May fast for 8 hours.      Followup: Return if symptoms worsen or fail to improve.    Please note that this dictation was created using voice recognition software. I have made every reasonable attempt to correct obvious errors, but I expect that there are  errors of grammar and possibly content that I did not discover before finalizing the note.

## 2021-04-17 ENCOUNTER — IMMUNIZATION (OUTPATIENT)
Dept: FAMILY PLANNING/WOMEN'S HEALTH CLINIC | Facility: IMMUNIZATION CENTER | Age: 46
End: 2021-04-17
Payer: OTHER GOVERNMENT

## 2021-04-17 DIAGNOSIS — Z23 ENCOUNTER FOR VACCINATION: Primary | ICD-10-CM

## 2021-04-17 PROCEDURE — 0012A MODERNA SARS-COV-2 VACCINE: CPT

## 2021-04-17 PROCEDURE — 91301 MODERNA SARS-COV-2 VACCINE: CPT

## 2021-05-09 DIAGNOSIS — F33.1 MAJOR DEPRESSIVE DISORDER, RECURRENT EPISODE, MODERATE (HCC): ICD-10-CM

## 2021-05-09 DIAGNOSIS — E78.2 MIXED HYPERLIPIDEMIA: ICD-10-CM

## 2021-05-10 DIAGNOSIS — L65.9 HAIR LOSS: ICD-10-CM

## 2021-05-10 RX ORDER — FINASTERIDE 5 MG/1
TABLET, FILM COATED ORAL
Qty: 90 TABLET | Refills: 1 | Status: SHIPPED | OUTPATIENT
Start: 2021-05-10 | End: 2022-02-28

## 2021-05-10 RX ORDER — ATORVASTATIN CALCIUM 20 MG/1
TABLET, FILM COATED ORAL
Qty: 90 TABLET | Refills: 1 | Status: SHIPPED | OUTPATIENT
Start: 2021-05-10 | End: 2021-11-05

## 2021-05-10 RX ORDER — BUPROPION HYDROCHLORIDE 300 MG/1
TABLET ORAL
Qty: 90 TABLET | Refills: 1 | Status: SHIPPED | OUTPATIENT
Start: 2021-05-10 | End: 2021-11-05

## 2021-05-12 DIAGNOSIS — G47.09 OTHER INSOMNIA: ICD-10-CM

## 2021-05-12 RX ORDER — ZOLPIDEM TARTRATE 5 MG/1
5 TABLET ORAL NIGHTLY PRN
Qty: 30 TABLET | Refills: 0 | Status: SHIPPED | OUTPATIENT
Start: 2021-05-12 | End: 2021-07-30

## 2021-07-08 DIAGNOSIS — F33.1 MAJOR DEPRESSIVE DISORDER, RECURRENT EPISODE, MODERATE (HCC): ICD-10-CM

## 2021-07-08 DIAGNOSIS — G47.09 OTHER INSOMNIA: ICD-10-CM

## 2021-07-08 RX ORDER — QUETIAPINE FUMARATE 25 MG/1
25 TABLET, FILM COATED ORAL DAILY
Qty: 90 TABLET | Refills: 2 | Status: SHIPPED | OUTPATIENT
Start: 2021-07-08 | End: 2022-02-28 | Stop reason: SDUPTHER

## 2021-07-30 DIAGNOSIS — F33.1 MAJOR DEPRESSIVE DISORDER, RECURRENT EPISODE, MODERATE (HCC): ICD-10-CM

## 2021-07-30 DIAGNOSIS — F41.1 GAD (GENERALIZED ANXIETY DISORDER): ICD-10-CM

## 2021-07-30 RX ORDER — SERTRALINE HYDROCHLORIDE 100 MG/1
TABLET, FILM COATED ORAL
Qty: 90 TABLET | Refills: 3 | Status: SHIPPED | OUTPATIENT
Start: 2021-07-30 | End: 2022-02-28 | Stop reason: SDUPTHER

## 2021-07-30 RX ORDER — SERTRALINE HYDROCHLORIDE 100 MG/1
TABLET, FILM COATED ORAL
Qty: 90 TABLET | Refills: 3 | Status: SHIPPED | OUTPATIENT
Start: 2021-07-30 | End: 2021-07-30

## 2021-07-30 RX ORDER — LORAZEPAM 0.5 MG/1
0.5 TABLET ORAL EVERY 8 HOURS PRN
Qty: 30 TABLET | Refills: 0 | Status: SHIPPED | OUTPATIENT
Start: 2021-07-30 | End: 2021-10-11 | Stop reason: SDUPTHER

## 2021-08-26 RX ORDER — SUMATRIPTAN 50 MG/1
TABLET, FILM COATED ORAL
Qty: 9 TABLET | Refills: 5 | Status: SHIPPED | OUTPATIENT
Start: 2021-08-26 | End: 2021-09-20 | Stop reason: SDUPTHER

## 2021-08-30 ENCOUNTER — TELEMEDICINE (OUTPATIENT)
Dept: MEDICAL GROUP | Facility: MEDICAL CENTER | Age: 46
End: 2021-08-30
Payer: OTHER GOVERNMENT

## 2021-08-30 VITALS — BODY MASS INDEX: 26.41 KG/M2 | WEIGHT: 195 LBS | HEIGHT: 72 IN

## 2021-08-30 DIAGNOSIS — R05.9 COUGH: ICD-10-CM

## 2021-08-30 DIAGNOSIS — G47.09 OTHER INSOMNIA: ICD-10-CM

## 2021-08-30 DIAGNOSIS — Z00.00 PREVENTATIVE HEALTH CARE: ICD-10-CM

## 2021-08-30 PROCEDURE — 99214 OFFICE O/P EST MOD 30 MIN: CPT | Mod: 95 | Performed by: PHYSICIAN ASSISTANT

## 2021-08-30 RX ORDER — ZOLPIDEM TARTRATE 5 MG/1
5 TABLET ORAL NIGHTLY PRN
Qty: 30 TABLET | Refills: 2 | Status: SHIPPED | OUTPATIENT
Start: 2021-08-30 | End: 2021-09-29

## 2021-08-30 RX ORDER — ALBUTEROL SULFATE 90 UG/1
2 AEROSOL, METERED RESPIRATORY (INHALATION) EVERY 4 HOURS PRN
Qty: 18 G | Refills: 5 | Status: SHIPPED | OUTPATIENT
Start: 2021-08-30 | End: 2022-02-28 | Stop reason: SDUPTHER

## 2021-08-30 NOTE — ASSESSMENT & PLAN NOTE
This is a pleasant 46-year-old on a virtual appointment today.  Recently retired from the .  Has not been doing the same job tasks since March but still dealing with symptoms of anxiety, depression and insomnia.  Requesting a refill today of Ambien.  Takes 5 mg as needed.  [facility]  Is still waiting for an appointment.

## 2021-08-30 NOTE — PROGRESS NOTES
Subjective:   Mike Rios is a 46 y.o. male here today for insomnia, coughing history and preventative health care.    This evaluation was conducted via Zoom using secure and encrypted videoconferencing technology. The patient was in a private location in the [facility].    The patient's identity was confirmed and verbal consent was obtained for this virtual visit.      Other insomnia  This is a pleasant 46-year-old on a virtual appointment today.  Recently retired from the .  Has not been doing the same job tasks since March but still dealing with symptoms of anxiety, depression and insomnia.  Requesting a refill today of Ambien.  Takes 5 mg as needed.  [facility]  Is still waiting for an appointment.    Cough  In the past was given a rescue inhaler for coughing.  Medication was effective.  He would like a refill.  No clear diagnosis of RAD in the past.       Current medicines (including changes today)  Current Outpatient Medications   Medication Sig Dispense Refill   • zolpidem (AMBIEN) 5 MG Tab Take 1 Tablet by mouth at bedtime as needed for Sleep for up to 30 days. FOR SLEEP. 30 Tablet 2   • albuterol 108 (90 Base) MCG/ACT Aero Soln inhalation aerosol Inhale 2 Puffs every four hours as needed. 18 g 5   • SUMAtriptan (IMITREX) 50 MG Tab TAKE 1 TABLET BY MOUTH 1 TIME AS NEEDED FOR MIGRAINE. MAY TAKE SECOND TABLET 2 HOURS AFTER IF STILL AT ONSET OF HEADACHE 9 Tablet 5   • meclizine (ANTIVERT) 25 MG Tab TAKE 1 TABLET BY MOUTH THREE TIMES DAILY AS NEEDED FOR VERTIGO 60 tablet 1   • sertraline (ZOLOFT) 100 MG Tab Take 1 tablet daily. 90 tablet 3   • QUEtiapine (SEROQUEL) 25 MG Tab Take 1 tablet by mouth every day. 90 tablet 2   • atorvastatin (LIPITOR) 20 MG Tab TAKE 1 TABLET BY MOUTH EVERY DAY 90 tablet 1   • buPROPion (WELLBUTRIN XL) 300 MG XL tablet TAKE 1 TABLET BY MOUTH EVERY DAY IN THE MORNING 90 tablet 1   • finasteride (PROSCAR) 5 MG Tab TAKE 1/4  TABLET BY MOUTH ONCE DAILY 90 tablet 1   • prazosin (MINIPRESS) 2 MG Cap TAKE 1-2 CAPSULES BY MOUTH ONCE DAILY AT BEDTIME AS NEEDED FOR NIGHTMARES.     • Hypromell-Glycerin-Naphazoline (CLEAR EYES FOR DRY EYES PLUS) 0.8-0.25-0.012 % Solution Administer 1 Drop into affected eye(s) every four hours as needed (Bilateral). 30 mL 11   • omeprazole (PRILOSEC) 20 MG delayed-release capsule TAKE 1 CAPSULE BY MOUTH EVERY DAY 30 MINUTES BEFORE MEAL 270 Cap 1   • sildenafil citrate (VIAGRA) 100 MG tablet TAKE 1 TAB PO ONCE DAILY 30 MIN PRIOR TO ACTIVITY 9 Tab 11   • cyclobenzaprine (FLEXERIL) 10 MG Tab Take 1 Tab by mouth 3 times a day as needed. 60 Tab 5   • DYMISTA 137-50 MCG/ACT Suspension Spray 2 Sprays in nose every day.  5   • ibuprofen (MOTRIN) 200 MG Tab Take 200 mg by mouth every 6 hours as needed.     • diclofenac EC (VOLTAREN) 75 MG Tablet Delayed Response Take 1 Tab by mouth 2 times a day. 60 Tab 0     No current facility-administered medications for this visit.     He  has a past medical history of Back pain, Central sleep apnea, Depression, and FLIP (obstructive sleep apnea). He also has no past medical history of ASTHMA, Diabetes, Seizure (HCC), or Ulcer.    Social History and Family History were reviewed and updated.    ROS   No chest pain, no shortness of breath, no abdominal pain and all other systems were reviewed and are negative.       Objective:     Ht 1.829 m (6')   Wt 88.5 kg (195 lb)  Body mass index is 26.45 kg/m².   Physical Exam:  Constitutional: Alert, no distress.  Skin: Warm, dry, good turgor, no rashes in visible areas.  Eye: Equal, round and reactive, conjunctiva clear, lids normal.  ENMT: Lips without lesions, good dentition, oropharynx clear.  Neck: Trachea midline, no masses.   Lymph: No cervical or supraclavicular lymphadenopathy  Respiratory: Unlabored respiratory effort.  Psych: Alert and oriented x3, normal affect and mood.        Assessment and Plan:   The following treatment plan was  discussed    1. Other insomnia  Chronic condition.  Stable.  Renewed Ambien as directed.   reviewed.  Advised [facility] he may contact me for referral.  - zolpidem (AMBIEN) 5 MG Tab; Take 1 Tablet by mouth at bedtime as needed for Sleep for up to 30 days. FOR SLEEP.  Dispense: 30 Tablet; Refill: 2    2. Cough  Chronic, intermittent condition.  Renewed albuterol rescue letter as needed.  No further evaluation instructed at this time.  - albuterol 108 (90 Base) MCG/ACT Aero Soln inhalation aerosol; Inhale 2 Puffs every four hours as needed.  Dispense: 18 g; Refill: 5    3. Preventative health care  Ordered labs fasting.  He will be contacted with the results.  - HEMOGLOBIN A1C; Future  - Comp Metabolic Panel; Future  - VITAMIN D,25 HYDROXY; Future  - Lipid Profile; Future  - CBC WITH DIFFERENTIAL; Future         Followup: Return if symptoms worsen or fail to improve.    Please note that this dictation was created using voice recognition software. I have made every reasonable attempt to correct obvious errors, but I expect that there are errors of grammar and possibly content that I did not discover before finalizing the note.

## 2021-08-30 NOTE — ASSESSMENT & PLAN NOTE
In the past was given a rescue inhaler for coughing.  Medication was effective.  He would like a refill.  No clear diagnosis of RAD in the past.

## 2021-09-20 ENCOUNTER — TELEMEDICINE (OUTPATIENT)
Dept: MEDICAL GROUP | Facility: MEDICAL CENTER | Age: 46
End: 2021-09-20
Payer: OTHER GOVERNMENT

## 2021-09-20 VITALS — HEIGHT: 72 IN | WEIGHT: 201 LBS | BODY MASS INDEX: 27.22 KG/M2

## 2021-09-20 PROCEDURE — 99213 OFFICE O/P EST LOW 20 MIN: CPT | Mod: 95 | Performed by: PHYSICIAN ASSISTANT

## 2021-09-20 RX ORDER — SUMATRIPTAN 100 MG/1
TABLET, FILM COATED ORAL
Qty: 9 TABLET | Refills: 11 | Status: SHIPPED | OUTPATIENT
Start: 2021-09-20 | End: 2022-08-29 | Stop reason: SDUPTHER

## 2021-09-20 NOTE — PROGRESS NOTES
Subjective:   Mike Rios is a 46 y.o. male here today for migraines.    This evaluation was conducted via Zoom using secure and encrypted videoconferencing technology. The patient was in a private location in the state [facility].    The patient's identity was confirmed and verbal consent was obtained for this virtual visit.    Chronic migraine  This is a pleasant 46-year-old male on a virtual visit today.  States he has been having more migraines as of late and is run out of Imitrex tablets.  Gets 9 a month.  Medication in general is effective.  He is studying for a test.  Spending time on the computer.  He is preparing for a new job in October.  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       Current medicines (including changes today)  Current Outpatient Medications   Medication Sig Dispense Refill   • SUMAtriptan (IMITREX) 100 MG tablet TAKE 1 TABLET BY MOUTH 1 TIME AS NEEDED FOR MIGRAINE. MAY TAKE SECOND TABLET 2 HOURS AFTER IF STILL AT ONSET OF HEADACHE 9 Tablet 11   • zolpidem (AMBIEN) 5 MG Tab Take 1 Tablet by mouth at bedtime as needed for Sleep for up to 30 days. FOR SLEEP. 30 Tablet 2   • albuterol 108 (90 Base) MCG/ACT Aero Soln inhalation aerosol Inhale 2 Puffs every four hours as needed. 18 g 5   • meclizine (ANTIVERT) 25 MG Tab TAKE 1 TABLET BY MOUTH THREE TIMES DAILY AS NEEDED FOR VERTIGO 60 tablet 1   • sertraline (ZOLOFT) 100 MG Tab Take 1 tablet daily. 90 tablet 3   • QUEtiapine (SEROQUEL) 25 MG Tab Take 1 tablet by mouth every day. 90 tablet 2   • atorvastatin (LIPITOR) 20 MG Tab TAKE 1 TABLET BY MOUTH EVERY DAY 90 tablet 1   • buPROPion (WELLBUTRIN XL) 300 MG XL tablet TAKE 1 TABLET BY MOUTH EVERY DAY IN THE MORNING 90 tablet 1   • finasteride (PROSCAR) 5 MG Tab TAKE 1/4 TABLET BY MOUTH ONCE DAILY 90 tablet 1   • prazosin (MINIPRESS) 2 MG Cap TAKE 1-2 CAPSULES BY MOUTH ONCE DAILY AT BEDTIME AS  NEEDED FOR NIGHTMARES.     • Hypromell-Glycerin-Naphazoline (CLEAR EYES FOR DRY EYES PLUS) 0.8-0.25-0.012 % Solution Administer 1 Drop into affected eye(s) every four hours as needed (Bilateral). 30 mL 11   • omeprazole (PRILOSEC) 20 MG delayed-release capsule TAKE 1 CAPSULE BY MOUTH EVERY DAY 30 MINUTES BEFORE MEAL 270 Cap 1   • sildenafil citrate (VIAGRA) 100 MG tablet TAKE 1 TAB PO ONCE DAILY 30 MIN PRIOR TO ACTIVITY 9 Tab 11   • cyclobenzaprine (FLEXERIL) 10 MG Tab Take 1 Tab by mouth 3 times a day as needed. 60 Tab 5   • DYMISTA 137-50 MCG/ACT Suspension Spray 2 Sprays in nose every day.  5   • ibuprofen (MOTRIN) 200 MG Tab Take 200 mg by mouth every 6 hours as needed.     • diclofenac EC (VOLTAREN) 75 MG Tablet Delayed Response Take 1 Tab by mouth 2 times a day. 60 Tab 0     No current facility-administered medications for this visit.     He  has a past medical history of Back pain, Central sleep apnea, Depression, and FLIP (obstructive sleep apnea). He also has no past medical history of ASTHMA, Diabetes, Seizure (HCC), or Ulcer.    Social History and Family History were reviewed and updated.    ROS   No chest pain, no shortness of breath, no abdominal pain and all other systems were reviewed and are negative.       Objective:     Ht 1.829 m (6')   Wt 91.2 kg (201 lb)  Body mass index is 27.26 kg/m².   Physical Exam:  Constitutional: Alert, no distress.  Skin: Warm, dry, good turgor, no rashes in visible areas.  Eye: Equal, round and reactive, conjunctiva clear, lids normal.  ENMT: Lips without lesions, good dentition, oropharynx clear.  Neck: Trachea midline, no masses.   Lymph: No cervical or supraclavicular lymphadenopathy  Respiratory: Unlabored respiratory effort.  Psych: Alert and oriented x3, normal affect and mood.        Assessment and Plan:   The following treatment plan was discussed    1. Chronic migraine  Chronic condition.  He has been running out of medication prior to end of the month.  Will  increase the dosing to 100 mg from 50 mg.  Sent over a new prescription to his pharmacy.  If Imitrex is not effective any longer may try Maxalt.  - SUMAtriptan (IMITREX) 100 MG tablet; TAKE 1 TABLET BY MOUTH 1 TIME AS NEEDED FOR MIGRAINE. MAY TAKE SECOND TABLET 2 HOURS AFTER IF STILL AT ONSET OF HEADACHE  Dispense: 9 Tablet; Refill: 11         Followup: Return if symptoms worsen or fail to improve.    Please note that this dictation was created using voice recognition software. I have made every reasonable attempt to correct obvious errors, but I expect that there are errors of grammar and possibly content that I did not discover before finalizing the note.

## 2021-09-20 NOTE — ASSESSMENT & PLAN NOTE
This is a pleasant 46-year-old male on a virtual visit today.  States he has been having more migraines as of late and is run out of Imitrex tablets.  Gets 9 a month.  Medication in general is effective.  He is studying for a test.  Spending time on the computer.  He is preparing for a new job in October.  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

## 2021-09-28 DIAGNOSIS — M72.2 PLANTAR FASCIITIS, BILATERAL: ICD-10-CM

## 2021-09-28 DIAGNOSIS — G56.03 CARPAL TUNNEL SYNDROME, BILATERAL: ICD-10-CM

## 2021-10-11 DIAGNOSIS — F41.1 GAD (GENERALIZED ANXIETY DISORDER): ICD-10-CM

## 2021-10-11 DIAGNOSIS — F51.5 NIGHTMARES: ICD-10-CM

## 2021-10-11 RX ORDER — LORAZEPAM 0.5 MG/1
0.5 TABLET ORAL EVERY 8 HOURS PRN
Qty: 30 TABLET | Refills: 0 | Status: SHIPPED | OUTPATIENT
Start: 2021-10-11 | End: 2021-11-23

## 2021-10-11 RX ORDER — PRAZOSIN HYDROCHLORIDE 2 MG/1
2 CAPSULE ORAL EVERY EVENING
Qty: 90 CAPSULE | Refills: 3 | Status: SHIPPED | OUTPATIENT
Start: 2021-10-11 | End: 2022-04-07

## 2021-11-05 DIAGNOSIS — N52.2 DRUG-INDUCED ERECTILE DYSFUNCTION: ICD-10-CM

## 2021-11-05 DIAGNOSIS — M62.838 TRAPEZIUS MUSCLE SPASM: ICD-10-CM

## 2021-11-05 DIAGNOSIS — E78.2 MIXED HYPERLIPIDEMIA: ICD-10-CM

## 2021-11-05 DIAGNOSIS — F33.1 MAJOR DEPRESSIVE DISORDER, RECURRENT EPISODE, MODERATE (HCC): ICD-10-CM

## 2021-11-05 RX ORDER — SILDENAFIL 100 MG/1
TABLET, FILM COATED ORAL
Qty: 9 TABLET | Refills: 11 | Status: SHIPPED | OUTPATIENT
Start: 2021-11-05 | End: 2022-02-28 | Stop reason: SDUPTHER

## 2021-11-05 RX ORDER — ATORVASTATIN CALCIUM 20 MG/1
TABLET, FILM COATED ORAL
Qty: 90 TABLET | Refills: 1 | Status: SHIPPED | OUTPATIENT
Start: 2021-11-05 | End: 2022-02-28 | Stop reason: SDUPTHER

## 2021-11-05 RX ORDER — BUPROPION HYDROCHLORIDE 300 MG/1
TABLET ORAL
Qty: 90 TABLET | Refills: 1 | Status: SHIPPED | OUTPATIENT
Start: 2021-11-05 | End: 2022-02-28 | Stop reason: SDUPTHER

## 2021-11-05 RX ORDER — CYCLOBENZAPRINE HCL 10 MG
10 TABLET ORAL 3 TIMES DAILY PRN
Qty: 60 TABLET | Refills: 5 | Status: SHIPPED | OUTPATIENT
Start: 2021-11-05

## 2021-11-23 DIAGNOSIS — F41.1 GAD (GENERALIZED ANXIETY DISORDER): ICD-10-CM

## 2021-11-23 RX ORDER — LORAZEPAM 0.5 MG/1
TABLET ORAL
Qty: 30 TABLET | Refills: 1 | Status: SHIPPED | OUTPATIENT
Start: 2021-11-23 | End: 2021-12-23

## 2021-11-24 NOTE — TELEPHONE ENCOUNTER
Received request via: Pharmacy    Was the patient seen in the last year in this department? Yes    Does the patient have an active prescription (recently filled or refills available) for medication(s) requested? No    Requested Prescriptions     Pending Prescriptions Disp Refills   • LORazepam (ATIVAN) 0.5 MG Tab [Pharmacy Med Name: LORAZEPAM 0.5MG TABLETS] 30 Tablet      Sig: TAKE 1 TABLET BY MOUTH EVERY 8 HOURS( THREE TIMES DAILY) AS NEEDED FOR UP TO 30 DAYS

## 2021-12-01 DIAGNOSIS — F33.1 MAJOR DEPRESSIVE DISORDER, RECURRENT EPISODE, MODERATE (HCC): ICD-10-CM

## 2021-12-01 DIAGNOSIS — F43.12 CHRONIC POST-TRAUMATIC STRESS DISORDER (PTSD): ICD-10-CM

## 2021-12-03 ENCOUNTER — TELEMEDICINE (OUTPATIENT)
Dept: MEDICAL GROUP | Facility: MEDICAL CENTER | Age: 46
End: 2021-12-03
Payer: OTHER GOVERNMENT

## 2021-12-03 VITALS — HEIGHT: 72 IN | WEIGHT: 201 LBS | BODY MASS INDEX: 27.22 KG/M2

## 2021-12-03 DIAGNOSIS — G89.29 CHRONIC LEFT SHOULDER PAIN: ICD-10-CM

## 2021-12-03 DIAGNOSIS — M21.611 BILATERAL BUNIONS: ICD-10-CM

## 2021-12-03 DIAGNOSIS — M72.2 PLANTAR FASCIITIS, BILATERAL: ICD-10-CM

## 2021-12-03 DIAGNOSIS — G43.111 INTRACTABLE MIGRAINE WITH AURA WITH STATUS MIGRAINOSUS: ICD-10-CM

## 2021-12-03 DIAGNOSIS — M21.612 BILATERAL BUNIONS: ICD-10-CM

## 2021-12-03 DIAGNOSIS — M25.512 CHRONIC LEFT SHOULDER PAIN: ICD-10-CM

## 2021-12-03 DIAGNOSIS — K21.9 GASTROESOPHAGEAL REFLUX DISEASE WITHOUT ESOPHAGITIS: ICD-10-CM

## 2021-12-03 PROBLEM — M77.12 LATERAL EPICONDYLITIS OF BOTH ELBOWS: Status: ACTIVE | Noted: 2021-12-03

## 2021-12-03 PROBLEM — M77.11 LATERAL EPICONDYLITIS OF BOTH ELBOWS: Status: ACTIVE | Noted: 2021-12-03

## 2021-12-03 PROBLEM — M22.2X1 PATELLOFEMORAL PAIN SYNDROME OF BOTH KNEES: Status: ACTIVE | Noted: 2018-04-24

## 2021-12-03 PROBLEM — H93.13 TINNITUS OF BOTH EARS: Status: ACTIVE | Noted: 2021-12-03

## 2021-12-03 PROCEDURE — 99214 OFFICE O/P EST MOD 30 MIN: CPT | Mod: 95 | Performed by: PHYSICIAN ASSISTANT

## 2021-12-03 RX ORDER — HYDROCODONE BITARTRATE AND ACETAMINOPHEN 5; 325 MG/1; MG/1
1 TABLET ORAL EVERY 6 HOURS PRN
Qty: 42 TABLET | Refills: 0 | Status: SHIPPED | OUTPATIENT
Start: 2021-12-03 | End: 2021-12-10

## 2021-12-04 NOTE — ASSESSMENT & PLAN NOTE
Chronic condition.  Recently saw his podiatrist.  She recommended injections into his feet as well as bunionectomy.  He declined both treatment options.

## 2021-12-04 NOTE — ASSESSMENT & PLAN NOTE
Chronic condition.  Symptoms have been worse as of late.  Will wake up in the morning with symptoms.  States that he does not eat anything 3 hours prior to bedtime.  He currently takes omeprazole which would either be half hour prior to breakfast or maybe lunch.

## 2021-12-04 NOTE — ASSESSMENT & PLAN NOTE
This is a pleasant 46-year-old male in a virtual visit today.  He is in the dark.  Dealing with a migraine.  Complains of worsening left shoulder pain over the past 3 months.  There is a noted tear in that shoulder.  He is unsure how his symptoms became worse.  In the past was seen by Dr. Cardoso at [facility].  Is requesting something for pain.

## 2021-12-04 NOTE — PROGRESS NOTES
Subjective:   Mike Rios is a 46 y.o. male here today for chronic left shoulder pain with recent exacerbation, chronic migraines with current migraine, plantar fasciitis bilaterally and reflux.    This evaluation was conducted via Zoom using secure and encrypted videoconferencing technology. The patient was in a private location in the state [facility].    The patient's identity was confirmed and verbal consent was obtained for this virtual visit.      Chronic left shoulder pain  This is a pleasant 46-year-old male in a virtual visit today.  He is in the dark.  Dealing with a migraine.  Complains of worsening left shoulder pain over the past 3 months.  There is a noted tear in that shoulder.  He is unsure how his symptoms became worse.  In the past was seen by Dr. Cardoso at [facility].  Is requesting something for pain.    Intractable migraine with aura with status migrainosus  Chronic condition with recent exacerbation.  Sumatriptan has not been effective.  He just took a 100 mg tablet.    Plantar fasciitis, bilateral  Chronic condition.  Recently saw his podiatrist.  She recommended injections into his feet as well as bunionectomy.  He declined both treatment options.    Gastroesophageal reflux disease  Chronic condition.  Symptoms have been worse as of late.  Will wake up in the morning with symptoms.  States that he does not eat anything 3 hours prior to bedtime.  He currently takes omeprazole which would either be half hour prior to breakfast or maybe lunch.       Current medicines (including changes today)  Current Outpatient Medications   Medication Sig Dispense Refill   • HYDROcodone-acetaminophen (NORCO) 5-325 MG Tab per tablet Take 1 Tablet by mouth every 6 hours as needed for up to 7 days. 42 Tablet 0   • LORazepam (ATIVAN) 0.5 MG Tab TAKE 1 TABLET BY MOUTH EVERY 8 HOURS( THREE TIMES DAILY) AS NEEDED FOR UP TO 30 DAYS 30 Tablet 1   • buPROPion (WELLBUTRIN XL) 300 MG XL tablet TAKE 1 TABLET BY MOUTH  EVERY DAY IN THE MORNING 90 Tablet 1   • atorvastatin (LIPITOR) 20 MG Tab TAKE 1 TABLET BY MOUTH EVERY DAY 90 Tablet 1   • sildenafil citrate (VIAGRA) 100 MG tablet TAKE 1 TAB PO ONCE DAILY 30 MIN PRIOR TO ACTIVITY 9 Tablet 11   • cyclobenzaprine (FLEXERIL) 10 mg Tab Take 1 Tablet by mouth 3 times a day as needed. 60 Tablet 5   • prazosin (MINIPRESS) 2 MG Cap Take 1 Capsule by mouth every evening. 90 Capsule 3   • albuterol 108 (90 Base) MCG/ACT Aero Soln inhalation aerosol Inhale 2 Puffs every four hours as needed. 18 g 5   • meclizine (ANTIVERT) 25 MG Tab TAKE 1 TABLET BY MOUTH THREE TIMES DAILY AS NEEDED FOR VERTIGO 60 tablet 1   • sertraline (ZOLOFT) 100 MG Tab Take 1 tablet daily. 90 tablet 3   • QUEtiapine (SEROQUEL) 25 MG Tab Take 1 tablet by mouth every day. 90 tablet 2   • finasteride (PROSCAR) 5 MG Tab TAKE 1/4 TABLET BY MOUTH ONCE DAILY 90 tablet 1   • Hypromell-Glycerin-Naphazoline (CLEAR EYES FOR DRY EYES PLUS) 0.8-0.25-0.012 % Solution Administer 1 Drop into affected eye(s) every four hours as needed (Bilateral). 30 mL 11   • omeprazole (PRILOSEC) 20 MG delayed-release capsule TAKE 1 CAPSULE BY MOUTH EVERY DAY 30 MINUTES BEFORE MEAL 270 Cap 1   • DYMISTA 137-50 MCG/ACT Suspension Spray 2 Sprays in nose every day.  5   • ibuprofen (MOTRIN) 200 MG Tab Take 200 mg by mouth every 6 hours as needed. (Patient not taking: Reported on 10/21/2021)     • diclofenac EC (VOLTAREN) 75 MG Tablet Delayed Response Take 1 Tab by mouth 2 times a day. 60 Tab 0     No current facility-administered medications for this visit.     He  has a past medical history of Back pain, Central sleep apnea, Depression, and FLIP (obstructive sleep apnea). He also has no past medical history of ASTHMA, Diabetes, Seizure (HCC), or Ulcer.    Social History and Family History were reviewed and updated.    ROS   No chest pain, no shortness of breath, no abdominal pain and all other systems were reviewed and are negative.       Objective:      Ht 1.829 m (6')   Wt 91.2 kg (201 lb)  Body mass index is 27.26 kg/m².   Physical Exam:  Constitutional: Alert, no distress.  Skin: Warm, dry, good turgor, no rashes in visible areas.  Eye: Equal, round and reactive, conjunctiva clear, lids normal.  ENMT: Lips without lesions, good dentition, oropharynx clear.  Neck: Trachea midline, no masses.   Lymph: No cervical or supraclavicular lymphadenopathy  Respiratory: Unlabored respiratory effort.  Psych: Alert and oriented x3, normal affect and mood.        Assessment and Plan:   The following treatment plan was discussed    1. Chronic left shoulder pain  Chronic condition with recent exacerbation.  History of known tear.  Referred to [facility] for evaluation.   reviewed.  Sent over prescription for hydrocodone for a 1 week supply.  - Referral to Orthopedics  - HYDROcodone-acetaminophen (NORCO) 5-325 MG Tab per tablet; Take 1 Tablet by mouth every 6 hours as needed for up to 7 days.  Dispense: 42 Tablet; Refill: 0    2. Intractable migraine with aura with status migrainosus  Chronic condition with current status migrainosus.  Refer to neurology.  Make continue take sumatriptan as directed.  Hopefully in time his migraine will dissipate.  - Referral to Neurology    3. Gastroesophageal reflux disease without esophagitis  Chronic condition.  Uncontrolled.  Advised to take omeprazole half hour prior to breakfast as well as half hour prior to dinner.  Hopefully this will improve his symptoms in the morning.  Eventually he will need to see GI.    4. Plantar fasciitis, bilateral  Chronic condition.  Suggested getting the corticosteroid injections.  They could be beneficial.  Follow back up with podiatry.    5. Bilateral bunions  Chronic condition.  If he is able to handle the pain possibly wait for surgery.  Follow with podiatry.         Followup: Return in about 3 months (around 3/3/2022), or if symptoms worsen or fail to improve.    Please note that this dictation was  created using voice recognition software. I have made every reasonable attempt to correct obvious errors, but I expect that there are errors of grammar and possibly content that I did not discover before finalizing the note.

## 2021-12-04 NOTE — ASSESSMENT & PLAN NOTE
Chronic condition with recent exacerbation.  Sumatriptan has not been effective.  He just took a 100 mg tablet.

## 2022-02-28 ENCOUNTER — TELEMEDICINE (OUTPATIENT)
Dept: MEDICAL GROUP | Facility: MEDICAL CENTER | Age: 47
End: 2022-02-28
Payer: OTHER GOVERNMENT

## 2022-02-28 VITALS — WEIGHT: 210 LBS | BODY MASS INDEX: 27.83 KG/M2 | HEIGHT: 73 IN

## 2022-02-28 DIAGNOSIS — R53.83 FATIGUE, UNSPECIFIED TYPE: ICD-10-CM

## 2022-02-28 DIAGNOSIS — L65.9 HAIR LOSS: ICD-10-CM

## 2022-02-28 DIAGNOSIS — F33.1 MAJOR DEPRESSIVE DISORDER, RECURRENT EPISODE, MODERATE (HCC): ICD-10-CM

## 2022-02-28 DIAGNOSIS — K21.9 GASTROESOPHAGEAL REFLUX DISEASE: ICD-10-CM

## 2022-02-28 DIAGNOSIS — G47.09 OTHER INSOMNIA: ICD-10-CM

## 2022-02-28 DIAGNOSIS — F41.1 GAD (GENERALIZED ANXIETY DISORDER): ICD-10-CM

## 2022-02-28 DIAGNOSIS — N52.2 DRUG-INDUCED ERECTILE DYSFUNCTION: ICD-10-CM

## 2022-02-28 DIAGNOSIS — Z00.00 PREVENTATIVE HEALTH CARE: ICD-10-CM

## 2022-02-28 DIAGNOSIS — H04.123 BILATERAL DRY EYES: ICD-10-CM

## 2022-02-28 DIAGNOSIS — R05.9 COUGH: ICD-10-CM

## 2022-02-28 DIAGNOSIS — E78.2 MIXED HYPERLIPIDEMIA: ICD-10-CM

## 2022-02-28 PROCEDURE — 99214 OFFICE O/P EST MOD 30 MIN: CPT | Performed by: PHYSICIAN ASSISTANT

## 2022-02-28 RX ORDER — QUETIAPINE FUMARATE 25 MG/1
25 TABLET, FILM COATED ORAL DAILY
Qty: 90 TABLET | Refills: 2 | Status: SHIPPED | OUTPATIENT
Start: 2022-02-28 | End: 2022-04-07 | Stop reason: SDUPTHER

## 2022-02-28 RX ORDER — FINASTERIDE 1 MG/1
1 TABLET, FILM COATED ORAL DAILY
Qty: 90 TABLET | Refills: 2 | Status: SHIPPED | OUTPATIENT
Start: 2022-02-28 | End: 2024-03-04 | Stop reason: SDUPTHER

## 2022-02-28 RX ORDER — OMEPRAZOLE 20 MG/1
20 CAPSULE, DELAYED RELEASE ORAL 2 TIMES DAILY
Qty: 180 CAPSULE | Refills: 2 | Status: SHIPPED | OUTPATIENT
Start: 2022-02-28 | End: 2023-05-12

## 2022-02-28 RX ORDER — SILDENAFIL 100 MG/1
TABLET, FILM COATED ORAL
Qty: 9 TABLET | Refills: 11 | Status: SHIPPED | OUTPATIENT
Start: 2022-02-28 | End: 2023-03-17

## 2022-02-28 RX ORDER — ALBUTEROL SULFATE 90 UG/1
2 AEROSOL, METERED RESPIRATORY (INHALATION) EVERY 4 HOURS PRN
Qty: 18 G | Refills: 11 | Status: SHIPPED | OUTPATIENT
Start: 2022-02-28

## 2022-02-28 RX ORDER — ZOLPIDEM TARTRATE 5 MG/1
5 TABLET ORAL
Qty: 30 TABLET | Refills: 2 | Status: SHIPPED | OUTPATIENT
Start: 2022-02-28 | End: 2022-03-30

## 2022-02-28 RX ORDER — BUPROPION HYDROCHLORIDE 450 MG/1
300 TABLET, FILM COATED, EXTENDED RELEASE ORAL EVERY MORNING
Qty: 90 TABLET | Refills: 2 | Status: SHIPPED | OUTPATIENT
Start: 2022-02-28 | End: 2022-04-07

## 2022-02-28 RX ORDER — SERTRALINE HYDROCHLORIDE 100 MG/1
TABLET, FILM COATED ORAL
Qty: 90 TABLET | Refills: 2 | Status: SHIPPED | OUTPATIENT
Start: 2022-02-28 | End: 2022-04-07

## 2022-02-28 RX ORDER — ATORVASTATIN CALCIUM 20 MG/1
20 TABLET, FILM COATED ORAL
Qty: 90 TABLET | Refills: 2 | Status: SHIPPED | OUTPATIENT
Start: 2022-02-28 | End: 2022-11-18

## 2022-03-01 NOTE — PROGRESS NOTES
Subjective:   Mike Rios is a 46 y.o. male here today for fatigue, bilateral dry eye, depression and other chronic medical conditions.    This evaluation was conducted via Zoom using secure and encrypted videoconferencing technology. The patient was in their home in the [facility].    The patient's identity was confirmed and verbal consent was obtained for this virtual visit.      Fatigue  This is a pleasant 46-year-old male in a virtual visit today.  He sent me a message through my chart.  States that his biggest concern recently is that he is tired all the time.  Does have naps during the day.  He will sleep approximately 6 to 8 hours a night.  He does take Ambien currently.  Requesting a refill.  Tries to offset the fatigue with red bull drinks and coffee.  Uses CPAP nightly and denies that there are any issues.  Is currently dealing with stress from his job.  Has depression and PTSD.  Has an appointment with psychiatry in April.  Currently on bupropion 300 mg daily.  Also on Zoloft 100 mg daily.  He is requesting me to review his medications and renew them as needed.    Bilateral dry eyes  Chronic condition.  [facility]  Does use an eyedrop.  Still having issues with bilateral dry eye.       Current medicines (including changes today)  Current Outpatient Medications   Medication Sig Dispense Refill   • atorvastatin (LIPITOR) 20 MG Tab Take 1 Tablet by mouth every day. 90 Tablet 2   • QUEtiapine (SEROQUEL) 25 MG Tab Take 1 Tablet by mouth every day. 90 Tablet 2   • buPROPion 450 MG TABLET SR 24 HR Take 300 mg by mouth every morning. 90 Tablet 2   • finasteride (PROPECIA) 1 MG tablet Take 1 Tablet by mouth every day. 90 Tablet 2   • omeprazole (PRILOSEC) 20 MG delayed-release capsule Take 1 Capsule by mouth 2 times a day. 1/2 hour prior to same meal. 180 Capsule 2   • sertraline (ZOLOFT) 100 MG Tab Take 1 tablet daily. 90 Tablet 2   • sildenafil citrate (VIAGRA) 100 MG  "tablet TAKE 1 TAB PO ONCE DAILY 30 MIN PRIOR TO ACTIVITY 9 Tablet 11   • albuterol 108 (90 Base) MCG/ACT Aero Soln inhalation aerosol Inhale 2 Puffs every four hours as needed. 18 g 11   • Hypromell-Glycerin-Naphazoline (CLEAR EYES FOR DRY EYES PLUS) 0.8-0.25-0.012 % Solution Administer 1 Drop into affected eye(s) every four hours as needed (Bilateral). 30 mL 11   • zolpidem (AMBIEN) 5 MG Tab Take 1 Tablet by mouth at bedtime as needed for Sleep for up to 30 days. 30 Tablet 2   • naproxen (NAPROSYN) 500 MG Tab TAKE 1 TABLET BY MOUTH TWICE DAILY WITH MEALS 60 Tablet 0   • cyclobenzaprine (FLEXERIL) 10 mg Tab Take 1 Tablet by mouth 3 times a day as needed. 60 Tablet 5   • prazosin (MINIPRESS) 2 MG Cap Take 1 Capsule by mouth every evening. 90 Capsule 3   • meclizine (ANTIVERT) 25 MG Tab TAKE 1 TABLET BY MOUTH THREE TIMES DAILY AS NEEDED FOR VERTIGO 60 tablet 1   • DYMISTA 137-50 MCG/ACT Suspension Spray 2 Sprays in nose every day.  5     No current facility-administered medications for this visit.     He  has a past medical history of Back pain, Central sleep apnea, Depression, and FLIP (obstructive sleep apnea).    He has no past medical history of ASTHMA, Diabetes, Seizure (HCC), or Ulcer.    Social History and Family History were reviewed and updated.    ROS   No chest pain, no shortness of breath, no abdominal pain and all other systems were reviewed and are negative.       Objective:     Ht 1.854 m (6' 1\")   Wt 95.3 kg (210 lb)  Body mass index is 27.71 kg/m².   Physical Exam:  Constitutional: Alert, no distress.  Skin: Warm, dry, good turgor, no rashes in visible areas.  Eye: Equal, round and reactive, conjunctiva clear, lids normal.  ENMT: Lips without lesions, good dentition, oropharynx clear.  Neck: Trachea midline, no masses.   Lymph: No cervical or supraclavicular lymphadenopathy  Respiratory: Unlabored respiratory effort.  Psych: Alert and oriented x3, normal affect and mood.        Assessment and Plan: "   The following treatment plan was discussed    1. Fatigue, unspecified type  Acute, new onset condition.  Unknown cause.  Could be low testosterone.  Could be some abnormality with labs.  Advised to get his labs done.  Fast.  Performed between 7 and 9 AM.  Symptoms could also be secondary to depression.  - CBC WITH DIFFERENTIAL; Future  - TSH WITH REFLEX TO FT4; Future  - Testosterone, Free & Total, Adult Male (w/SHBG); Future    2. Bilateral dry eyes  Chronic condition.  Continue eyedrops as directed.  Refer to ophthalmology for evaluation and treatment.  - Hypromell-Glycerin-Naphazoline (CLEAR EYES FOR DRY EYES PLUS) 0.8-0.25-0.012 % Solution; Administer 1 Drop into affected eye(s) every four hours as needed (Bilateral).  Dispense: 30 mL; Refill: 11  - Referral to Ophthalmology    3. Other insomnia  Chronic condition.  Stable.  Renewed Ambien at 5 mg.  Sent over 90-day supply with separate monthly refills.  - zolpidem (AMBIEN) 5 MG Tab; Take 1 Tablet by mouth at bedtime as needed for Sleep for up to 30 days.  Dispense: 30 Tablet; Refill: 2    4. Major depressive disorder, recurrent episode, moderate (HCC)  Chronic condition.  Renewed Seroquel and increased dose of bupropion to 4 and 50 mg daily.  Continue Zoloft.  Follow-up with psychiatry in April.  - QUEtiapine (SEROQUEL) 25 MG Tab; Take 1 Tablet by mouth every day.  Dispense: 90 Tablet; Refill: 2  - buPROPion 450 MG TABLET SR 24 HR; Take 300 mg by mouth every morning.  Dispense: 90 Tablet; Refill: 2  - sertraline (ZOLOFT) 100 MG Tab; Take 1 tablet daily.  Dispense: 90 Tablet; Refill: 2    5. Mixed hyperlipidemia  Chronic condition.  Renewed Lipitor.  Perform labs.  Fast 8 hours.  - atorvastatin (LIPITOR) 20 MG Tab; Take 1 Tablet by mouth every day.  Dispense: 90 Tablet; Refill: 2    6. Hair loss  Chronic condition.  Renewed Propecia at 1 mg daily.  Advised to use good Rx for a coupon.  - finasteride (PROPECIA) 1 MG tablet; Take 1 Tablet by mouth every day.   Dispense: 90 Tablet; Refill: 2    7. Gastroesophageal reflux disease  Chronic condition.  Stable.  Renewed omeprazole at 20 mg twice daily.    8. LAKHWINDER (generalized anxiety disorder)  Chronic condition.  Stable.  Renewed Zoloft.  - sertraline (ZOLOFT) 100 MG Tab; Take 1 tablet daily.  Dispense: 90 Tablet; Refill: 2    9. Drug-induced erectile dysfunction  Chronic condition.  Stable.  Renew Viagra.  - sildenafil citrate (VIAGRA) 100 MG tablet; TAKE 1 TAB PO ONCE DAILY 30 MIN PRIOR TO ACTIVITY  Dispense: 9 Tablet; Refill: 11    10. Preventative health care  Labs.  Must fast 8 hours.  He will be contacted with the results.  - CBC WITH DIFFERENTIAL; Future  - Lipid Profile; Future  - VITAMIN D,25 HYDROXY; Future  - Comp Metabolic Panel; Future  - HEMOGLOBIN A1C; Future  - TSH WITH REFLEX TO FT4; Future  - Testosterone, Free & Total, Adult Male (w/SHBG); Future         Followup: Return in about 3 months (around 5/28/2022), or if symptoms worsen or fail to improve.    Please note that this dictation was created using voice recognition software. I have made every reasonable attempt to correct obvious errors, but I expect that there are errors of grammar and possibly content that I did not discover before finalizing the note.

## 2022-03-01 NOTE — ASSESSMENT & PLAN NOTE
This is a pleasant 46-year-old male in a virtual visit today.  He sent me a message through my chart.  States that his biggest concern recently is that he is tired all the time.  Does have naps during the day.  He will sleep approximately 6 to 8 hours a night.  He does take Ambien currently.  Requesting a refill.  Tries to offset the fatigue with red bull drinks and coffee.  Uses CPAP nightly and denies that there are any issues.  Is currently dealing with stress from his job.  Has depression and PTSD.  Has an appointment with psychiatry in April.  Currently on bupropion 300 mg daily.  Also on Zoloft 100 mg daily.  He is requesting me to review his medications and renew them as needed.

## 2022-03-01 NOTE — ASSESSMENT & PLAN NOTE
Chronic condition.  [facility]  Does use an eyedrop.  Still having issues with bilateral dry eye.

## 2022-03-14 ENCOUNTER — OFFICE VISIT (OUTPATIENT)
Dept: NEUROLOGY | Facility: MEDICAL CENTER | Age: 47
End: 2022-03-14
Attending: PSYCHIATRY & NEUROLOGY
Payer: OTHER GOVERNMENT

## 2022-03-14 VITALS
SYSTOLIC BLOOD PRESSURE: 126 MMHG | WEIGHT: 233.4 LBS | OXYGEN SATURATION: 97 % | BODY MASS INDEX: 31.61 KG/M2 | HEART RATE: 90 BPM | HEIGHT: 72 IN | DIASTOLIC BLOOD PRESSURE: 82 MMHG

## 2022-03-14 DIAGNOSIS — G44.51 HEMICRANIA CONTINUA: Primary | ICD-10-CM

## 2022-03-14 PROCEDURE — 99204 OFFICE O/P NEW MOD 45 MIN: CPT | Performed by: PSYCHIATRY & NEUROLOGY

## 2022-03-14 RX ORDER — INDOMETHACIN 25 MG/1
CAPSULE ORAL
Qty: 54 CAPSULE | Refills: 0 | Status: SHIPPED | OUTPATIENT
Start: 2022-03-14 | End: 2022-08-30

## 2022-03-14 NOTE — PROGRESS NOTES
"[facility] NEUROLOGY  GENERAL NEUROLOGY  NEW PATIENT VISIT    Referral source: Aamir Shepherd PA-C    CC: \"intractable migraine with aura and status...\"    HISTORY OF ILLNESS:  Mike Rios is a 47 y.o. man with a history most notable for migraine with aura, pre-diabetes, HLD, FLIP, CTS, MDD, and PTSD.  Today, he was unaccompanied, and he provided the following history:    The following is a summary of headache symptoms, presented in my standard format:    Family History: mother had normal headaches  Age at onset: 17, headaches changed 5-6 years ago  Location: left posterior cervical, left-temporal; NEVER on the right side  Radiation: no  Frequency: 1-2/week  Duration: hours  Headache Days/Month: 6-7/30  Quality: \"throbbing\"  Intensity: 10/10  Aura: once there were \"spots\" in the visual field  Photophobia/Phonophobia/Nausea/Vomiting: yes/maybe/yes/no  Provoked by Physical Activity?:   Triggers: stress, ?sleep deprivation, ?dehydration  Associated Symptoms: none  Autonomic Signs (such as ptosis, miosis, conjunctival injection, rhinorrhea, increased lacrimation): droopy eyelid on the left  Head Trauma: used to play basketball, fell and struck the back of his head, no LOC  Association with Menses: n/a  ED Visits: no  Hospitalizations: no  Missed Work Days: () yes  Sleep: 6-7 hours/night, doesn't get more because he is busy (3 kids)  Caffeine Intake: 2 cups/day, avoids in the afternoon  Hydration: tries to keep well-hydrated  Nutrition: regular meals  Exercise:   Analgesic Overuse:     Current Medication Regimen:  -     Medications Tried: Response  Preventive:  -     Abortive:  - acetaminophen  - sumatriptan: 100 mg is somewhat effective; 200 mg is somewhat more effective    Medications Not Tried:  - tricyclic antidepressants: med-med interaction with bupropion and sertraline  - venlafaxine: med-med interaction with bupropion and sertraline    MEDICAL AND SURGICAL HISTORY:  Past Medical History: "   Diagnosis Date   • Back pain    • Central sleep apnea    • Depression    • FLIP (obstructive sleep apnea)      Past Surgical History:   Procedure Laterality Date   • HERNIA REPAIR       MEDICATIONS:  Current Outpatient Medications   Medication Sig   • indomethacin (INDOCIN) 25 MG Cap Take 1 Capsule by mouth 3 times a day for 3 days, THEN 2 Capsules 3 times a day for 3 days, THEN 3 Capsules 3 times a day for 3 days.   • meclizine (ANTIVERT) 25 MG Tab TAKE 1 TABLET BY MOUTH THREE TIMES DAILY AS NEEDED FOR VERTIGO   • atorvastatin (LIPITOR) 20 MG Tab Take 1 Tablet by mouth every day.   • QUEtiapine (SEROQUEL) 25 MG Tab Take 1 Tablet by mouth every day.   • buPROPion 450 MG TABLET SR 24 HR Take 300 mg by mouth every morning.   • finasteride (PROPECIA) 1 MG tablet Take 1 Tablet by mouth every day.   • omeprazole (PRILOSEC) 20 MG delayed-release capsule Take 1 Capsule by mouth 2 times a day. 1/2 hour prior to same meal.   • sertraline (ZOLOFT) 100 MG Tab Take 1 tablet daily.   • sildenafil citrate (VIAGRA) 100 MG tablet TAKE 1 TAB PO ONCE DAILY 30 MIN PRIOR TO ACTIVITY   • albuterol 108 (90 Base) MCG/ACT Aero Soln inhalation aerosol Inhale 2 Puffs every four hours as needed.   • Hypromell-Glycerin-Naphazoline (CLEAR EYES FOR DRY EYES PLUS) 0.8-0.25-0.012 % Solution Administer 1 Drop into affected eye(s) every four hours as needed (Bilateral).   • zolpidem (AMBIEN) 5 MG Tab Take 1 Tablet by mouth at bedtime as needed for Sleep for up to 30 days.   • naproxen (NAPROSYN) 500 MG Tab TAKE 1 TABLET BY MOUTH TWICE DAILY WITH MEALS   • cyclobenzaprine (FLEXERIL) 10 mg Tab Take 1 Tablet by mouth 3 times a day as needed.   • prazosin (MINIPRESS) 2 MG Cap Take 1 Capsule by mouth every evening.   • DYMISTA 137-50 MCG/ACT Suspension Spray 2 Sprays in nose every day.     SOCIAL HISTORY:  Social History     Tobacco Use   • Smoking status: Never Smoker   • Smokeless tobacco: Never Used   Substance Use Topics   • Alcohol use: Yes      Alcohol/week: 0.0 oz     Comment: Rare     Social History     Social History Narrative   • Not on file     FAMILY HISTORY:  Family History   Problem Relation Age of Onset   • Heart Attack Other    • Hypertension Father    • Heart Disease Father    • Hyperlipidemia Father    • Cancer Mother         Lung CA   • Psychiatric Illness Neg Hx      REVIEW OF SYSTEMS:  A ROS was completed.  Pertinent positives and negatives were included in the HPI, above.  All other systems were reviewed and are negative.    PHYSICAL EXAM:  General/Medical:  - NAD  - hair, skin, nails, and joints were normal    Neuro:  MENTAL STATUS: awake and alert; no deficits of speech or language; oriented to conversation; affect was appropriate to situation; pleasant, cooperative    CRANIAL NERVES:    II: acuity: 20/400 near equivalent/J1, fields: intact to confrontation, pupils: 3/3 to 2/2 without a relative afferent pupillary defect, discs: sharp    III/IV/VI: versions: intact without nystagmus    V: facial sensation: symmetric to light touch    VII: facial expression: symmetric    VIII: hearing: intact to finger rub    IX/X: palate: elevates symmetrically    XI: shoulder shrug: symmetric    XII: tongue: midline    MOTOR:  - bulk: normal throughout  - tone: normal in the upper extremities  Upper Extremity Strength  (R/L)    5/5   Elbow flexion 5/5   Elbow extension 5/5   Shoulder abduction 5/5     Lower Extremity Strength  (R/L)   Hip flexion 5/5   Knee extension 5/5   Knee flexion 5/5   Ankle plantarflexion NT   Ankle dorsiflexion NT     - can walk on toes and heels  - pronator drift: absent  - abnormal movements: none    SENSATION:  - light touch: grossly intact over the upper and lower extremities  - vibration (R/L, seconds): NT/NT at the great toes  - pinprick: NT  - proprioception: NT  - Romberg: absent    COORDINATION:  - finger to nose: NT  - finger tapping: rapid and accurate, bilaterally    REFLEXES:  Reflex Right Left   BR 2+ 2+    Biceps 2+ 2+   Triceps 2+ 2+   Patellae 2+ 2+   Achilles NT NT   Toes NT NT     GAIT:  - narrow base and normal  - heel-raised/toe-raised gait: NT  - tandem gait: NT    REVIEW OF IMAGING STUDIES:  No brain imaging available for review.    REVIEW OF LABORATORY STUDIES:  No recent data available for review.    ASSESSMENT:  Mike Rios is a 47 y.o. 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  Plan for indomethacin trial and MRI (to assess for pituitary lesions).  If indomethacin is ineffective I will transition to treatments for migraine.    PLAN:  Hemicrania Continua vs Migraine:  Prevention:  - indomethacin trial; most common side effects discussed  - get 7-9 hours of sleep per night; can try supplementing melatonin 2-10 mg, 2-3 hours before bedtime  - drink plenty of fluids (urine should be nearly clear)  - avoid excessive caffeine intake (no more than 2 servings per day and nothing in the afternoon)  - eat regular meals (don't skip meals)  - get moderate exercise (even just a 20 minute walk daily)    - keep a headache log    - MRI brain w/o and w/ contrast    Follow-Up:  - Return in about 6 weeks (around 4/25/2022).    Signed: Rogelio Nieves M.D.

## 2022-04-07 ENCOUNTER — TELEMEDICINE (OUTPATIENT)
Dept: BEHAVIORAL HEALTH | Facility: CLINIC | Age: 47
End: 2022-04-07
Payer: OTHER GOVERNMENT

## 2022-04-07 DIAGNOSIS — F41.0 PANIC ATTACKS: ICD-10-CM

## 2022-04-07 DIAGNOSIS — F33.2 SEVERE EPISODE OF RECURRENT MAJOR DEPRESSIVE DISORDER, WITHOUT PSYCHOTIC FEATURES (HCC): ICD-10-CM

## 2022-04-07 DIAGNOSIS — F43.12 CHRONIC POST-TRAUMATIC STRESS DISORDER (PTSD): ICD-10-CM

## 2022-04-07 DIAGNOSIS — Z79.899 CHRONIC PRESCRIPTION BENZODIAZEPINE USE: ICD-10-CM

## 2022-04-07 DIAGNOSIS — F33.1 MAJOR DEPRESSIVE DISORDER, RECURRENT EPISODE, MODERATE (HCC): ICD-10-CM

## 2022-04-07 DIAGNOSIS — G47.09 OTHER INSOMNIA: ICD-10-CM

## 2022-04-07 DIAGNOSIS — F41.1 GAD (GENERALIZED ANXIETY DISORDER): ICD-10-CM

## 2022-04-07 PROCEDURE — 99205 OFFICE O/P NEW HI 60 MIN: CPT | Mod: 95 | Performed by: PSYCHIATRY & NEUROLOGY

## 2022-04-07 RX ORDER — PROPRANOLOL HYDROCHLORIDE 10 MG/1
TABLET ORAL
Qty: 90 TABLET | Refills: 1 | Status: SHIPPED | OUTPATIENT
Start: 2022-04-07 | End: 2022-04-07

## 2022-04-07 RX ORDER — QUETIAPINE FUMARATE 25 MG/1
25 TABLET, FILM COATED ORAL DAILY
Qty: 90 TABLET | Refills: 0 | Status: SHIPPED | OUTPATIENT
Start: 2022-04-07 | End: 2022-05-20

## 2022-04-07 RX ORDER — LORAZEPAM 0.5 MG/1
0.5 TABLET ORAL
Qty: 30 TABLET | Refills: 1 | Status: SHIPPED | OUTPATIENT
Start: 2022-04-07 | End: 2022-05-07

## 2022-04-07 RX ORDER — PRAZOSIN HYDROCHLORIDE 1 MG/1
1 CAPSULE ORAL NIGHTLY
Qty: 30 CAPSULE | Refills: 1 | Status: SHIPPED | OUTPATIENT
Start: 2022-04-07 | End: 2022-04-07

## 2022-04-07 RX ORDER — ZOLPIDEM TARTRATE 5 MG/1
5 TABLET ORAL NIGHTLY PRN
Qty: 30 TABLET | Refills: 1 | Status: SHIPPED | OUTPATIENT
Start: 2022-04-07 | End: 2022-05-07

## 2022-04-07 RX ORDER — SERTRALINE HYDROCHLORIDE 100 MG/1
TABLET, FILM COATED ORAL
Qty: 83 TABLET | Refills: 0 | Status: SHIPPED | OUTPATIENT
Start: 2022-04-07 | End: 2022-05-20

## 2022-04-07 RX ORDER — PRAZOSIN HYDROCHLORIDE 2 MG/1
2 CAPSULE ORAL NIGHTLY
Qty: 30 CAPSULE | Refills: 1 | Status: SHIPPED | OUTPATIENT
Start: 2022-04-07 | End: 2022-04-07

## 2022-04-07 RX ORDER — BUPROPION HYDROCHLORIDE 300 MG/1
300 TABLET ORAL EVERY MORNING
Qty: 30 TABLET | Refills: 1 | Status: SHIPPED | OUTPATIENT
Start: 2022-04-07 | End: 2022-05-20 | Stop reason: SDUPTHER

## 2022-04-07 ASSESSMENT — ANXIETY QUESTIONNAIRES
GAD7 TOTAL SCORE: 14
1. FEELING NERVOUS, ANXIOUS, OR ON EDGE: NEARLY EVERY DAY
5. BEING SO RESTLESS THAT IT IS HARD TO SIT STILL: MORE THAN HALF THE DAYS
2. NOT BEING ABLE TO STOP OR CONTROL WORRYING: MORE THAN HALF THE DAYS
7. FEELING AFRAID AS IF SOMETHING AWFUL MIGHT HAPPEN: MORE THAN HALF THE DAYS
4. TROUBLE RELAXING: MORE THAN HALF THE DAYS
6. BECOMING EASILY ANNOYED OR IRRITABLE: SEVERAL DAYS
3. WORRYING TOO MUCH ABOUT DIFFERENT THINGS: MORE THAN HALF THE DAYS

## 2022-04-07 ASSESSMENT — PATIENT HEALTH QUESTIONNAIRE - PHQ9
SUM OF ALL RESPONSES TO PHQ QUESTIONS 1-9: 19
5. POOR APPETITE OR OVEREATING: 2 - MORE THAN HALF THE DAYS
CLINICAL INTERPRETATION OF PHQ2 SCORE: 4
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
## 2022-05-09 ENCOUNTER — APPOINTMENT (OUTPATIENT)
Dept: NEUROLOGY | Facility: MEDICAL CENTER | Age: 47
End: 2022-05-09
Attending: PSYCHIATRY & NEUROLOGY
Payer: OTHER GOVERNMENT

## 2022-05-11 ENCOUNTER — TELEMEDICINE (OUTPATIENT)
Dept: NEUROLOGY | Facility: MEDICAL CENTER | Age: 47
End: 2022-05-11
Attending: PSYCHIATRY & NEUROLOGY
Payer: OTHER GOVERNMENT

## 2022-05-11 VITALS — BODY MASS INDEX: 31.56 KG/M2 | WEIGHT: 233 LBS | HEIGHT: 72 IN

## 2022-05-11 DIAGNOSIS — G43.119 INTRACTABLE MIGRAINE WITH AURA WITHOUT STATUS MIGRAINOSUS: Primary | ICD-10-CM

## 2022-05-11 PROCEDURE — 99442 PR PHYSICIAN TELEPHONE EVALUATION 11-20 MIN: CPT | Performed by: PSYCHIATRY & NEUROLOGY

## 2022-05-11 RX ORDER — ONDANSETRON 4 MG/1
TABLET, FILM COATED ORAL
Qty: 20 TABLET | Refills: 5 | Status: SHIPPED | OUTPATIENT
Start: 2022-05-11 | End: 2022-06-11

## 2022-05-11 RX ORDER — PROPRANOLOL HYDROCHLORIDE 20 MG/1
20 TABLET ORAL 2 TIMES DAILY
Qty: 60 TABLET | Refills: 5 | Status: SHIPPED | OUTPATIENT
Start: 2022-05-11 | End: 2022-05-20 | Stop reason: SDUPTHER

## 2022-05-11 RX ORDER — SUMATRIPTAN 100 MG/1
100 TABLET, FILM COATED ORAL PRN
COMMUNITY
Start: 2022-05-06 | End: 2022-08-29

## 2022-05-11 NOTE — PROGRESS NOTES
"[facility] NEUROLOGY  GENERAL NEUROLOGY  FOLLOW-UP VISIT    This evaluation was conducted via telephone.  The patient was in their home in the [facility].    The patient's identity was confirmed and verbal consent was obtained for this virtual visit.    CC: hemicrania continua vs migraine    INTERVAL HISTORY:  Mike Rios is a 47 y.o. 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  I last saw him in the clinic on 3/14/2022.  At that time I recommended a trial of indomethacin as well as MRI brain w/o and w/ contrast.  Today, I followed up with him via Zoom, and he provided the following interval history:    The following is a summary of headache symptoms, presented in my standard format:     Family History: mother had normal headaches  Age at onset: 17, headaches changed 5-6 years ago  Location: left posterior cervical, left-temporal; NEVER on the right side  Radiation: left posterior cervical to left temple  Frequency: 1-2/week  Duration: hours  Headache Days/Month: 6-7/30  Quality: \"throbbing\"  Intensity: 10/10  Aura: once there were \"spots\" in the visual field  Photophobia/Phonophobia/Nausea/Vomiting: yes/maybe/yes/no  Provoked by Physical Activity?:   Triggers: stress, ?sleep deprivation, ?dehydration  Associated Symptoms: none  Autonomic Signs (such as ptosis, miosis, conjunctival injection, rhinorrhea, increased lacrimation): droopy eyelid on the left  Head Trauma: used to play basketball, fell and struck the back of his head, no LOC  Association with Menses: n/a  ED Visits: no  Hospitalizations: no  Missed Work Days: () yes  Sleep: 6-7 hours/night, doesn't get more because he is busy (3 kids)  Caffeine Intake: 2 cups/day, avoids in the afternoon  Hydration: tries to keep well-hydrated  Nutrition: regular meals  Exercise:   Analgesic Overuse:     Current Medication Regimen:  - propranolol: 10 mg BID (prescribed for " anxiety and sleep)  - sumatriptan: 100 mg is somewhat effective; 200 mg is somewhat more effective    Medications Tried: Response  Preventive:  - indomethacin: ineffective, helped joint pain, associated with fatigue    Abortive:  - acetaminophen    Medications Not Tried:  - tricyclic antidepressants: med-med interaction with bupropion and sertraline  - venlafaxine: med-med interaction with bupropion and sertraline    MEDICATIONS:  Current Outpatient Medications   Medication Sig   • propranolol (INDERAL) 10 MG Tab TAKE 1 TO 2 TABLETS BY MOUTH 1 TO 2 TIMES DAILY AS NEEDED FOR ANXIETY   • sertraline (ZOLOFT) 100 MG Tab Take 1.5 Tablets by mouth every day for 15 days, THEN 2 Tablets every day for 30 days.   • QUEtiapine (SEROQUEL) 25 MG Tab Take 1 Tablet by mouth every day.   • buPROPion (WELLBUTRIN XL) 300 MG XL tablet Take 1 Tablet by mouth every morning.   • prazosin (MINIPRESS) 2 MG Cap TAKE 1 CAPSULE BY MOUTH ONCE DAILY IN THE EVENING.   • prazosin (MINIPRESS) 1 MG Cap TAKE ONE CAPSULE BY MOUTH ONCE DAILY IN THE EVENING.   • meclizine (ANTIVERT) 25 MG Tab TAKE 1 TABLET BY MOUTH THREE TIMES DAILY AS NEEDED FOR VERTIGO   • atorvastatin (LIPITOR) 20 MG Tab Take 1 Tablet by mouth every day.   • finasteride (PROPECIA) 1 MG tablet Take 1 Tablet by mouth every day.   • omeprazole (PRILOSEC) 20 MG delayed-release capsule Take 1 Capsule by mouth 2 times a day. 1/2 hour prior to same meal.   • sildenafil citrate (VIAGRA) 100 MG tablet TAKE 1 TAB PO ONCE DAILY 30 MIN PRIOR TO ACTIVITY   • albuterol 108 (90 Base) MCG/ACT Aero Soln inhalation aerosol Inhale 2 Puffs every four hours as needed.   • Hypromell-Glycerin-Naphazoline (CLEAR EYES FOR DRY EYES PLUS) 0.8-0.25-0.012 % Solution Administer 1 Drop into affected eye(s) every four hours as needed (Bilateral).   • naproxen (NAPROSYN) 500 MG Tab TAKE 1 TABLET BY MOUTH TWICE DAILY WITH MEALS   • cyclobenzaprine (FLEXERIL) 10 mg Tab Take 1 Tablet by mouth 3 times a day as needed.    • DYMISTA 137-50 MCG/ACT Suspension Spray 2 Sprays in nose every day.     MEDICAL, SOCIAL, AND FAMILY HISTORY:  There is no change in the patient's ROS or medical, social, or family histories since the previous visit on 3/14/2022.    REVIEW OF SYSTEMS:  A ROS was completed.  Pertinent positives and negatives were included in the HPI, above.  All other systems were reviewed and are negative.    PHYSICAL EXAM:  A physical exam was not performed because this visit was conducted via telephone (Zoom was unsuccessful).    REVIEW OF IMAGING STUDIES:  No additional images since the last visit.    REVIEW OF LABORATORY STUDIES:  No additional data since the last visit.    ASSESSMENT:  Mike Rios is a 47 y.o. man with migraine with aura a history otherwise notable for pre-diabetes, HLD, FLIP, CTS, MDD, and PTSD.  An indomethacin trial was unsuccessful.  Therefore, we will proceed under the assumption that Lyndsays headaches are related to migraine.  Plans/recommenadtiosn as follows:    PLAN:  Migraine w/ Aura:  Prevention:  - increase propranolol to 20 mg BID; most common side effects discussed  - could try supplementing:  - magnesium: 400-600 mg once or 200-300 mg twice daily  - riboflavin (vitamin B2): 400 mg once daily  - coenzyme Q10: 300 mg daily  - get 7-9 hours of sleep per night; can try supplementing melatonin 2-10 mg, 2-3 hours before bedtime  - drink plenty of fluids (urine should be nearly clear)  - avoid excessive caffeine intake (no more than 2 servings per day and nothing in the afternoon)  - eat regular meals (don't skip meals)  - get moderate exercise (even just a 20 minute walk daily)    Rescue:  - sumatriptan 100 mg: take this at the onset of aura or headache pain; may re-dose x1 after 2 hours if headache persists; do not use more than 2 days/week  - ondansetron 4 mg: take this at the onset of aura or headache to prevent or reduce nausea; may re-dose after 2 hours if headache or nausea persist; do  not use more often than 3 days/week  - do not use analgesics (e.g., ibuprofen, acetaminophen) more than 2 days per week in order to avoid analgesic rebound headaches    - keep a headache log    - will defer MRI brain for now (given lower suspicion for hemicrania continua)    Follow-Up:  - Return in about 2 months (around 7/11/2022).    Signed: Rogelio Nieves M.D.    BILLING DOCUMENTATION:   I spent 15 minutes on the phone with the patient.

## 2022-05-20 ENCOUNTER — TELEMEDICINE (OUTPATIENT)
Dept: BEHAVIORAL HEALTH | Facility: CLINIC | Age: 47
End: 2022-05-20
Payer: OTHER GOVERNMENT

## 2022-05-20 DIAGNOSIS — F33.2 SEVERE EPISODE OF RECURRENT MAJOR DEPRESSIVE DISORDER, WITHOUT PSYCHOTIC FEATURES (HCC): ICD-10-CM

## 2022-05-20 DIAGNOSIS — G43.119 INTRACTABLE MIGRAINE WITH AURA WITHOUT STATUS MIGRAINOSUS: ICD-10-CM

## 2022-05-20 DIAGNOSIS — G47.09 OTHER INSOMNIA: ICD-10-CM

## 2022-05-20 DIAGNOSIS — F43.12 CHRONIC POST-TRAUMATIC STRESS DISORDER (PTSD): ICD-10-CM

## 2022-05-20 DIAGNOSIS — F41.1 GAD (GENERALIZED ANXIETY DISORDER): ICD-10-CM

## 2022-05-20 DIAGNOSIS — F41.0 PANIC ATTACKS: ICD-10-CM

## 2022-05-20 PROCEDURE — 99214 OFFICE O/P EST MOD 30 MIN: CPT | Mod: 95 | Performed by: PSYCHIATRY & NEUROLOGY

## 2022-05-20 RX ORDER — PRAZOSIN HYDROCHLORIDE 2 MG/1
4 CAPSULE ORAL NIGHTLY
Qty: 60 CAPSULE | Refills: 1 | Status: SHIPPED | OUTPATIENT
Start: 2022-05-20 | End: 2022-05-20

## 2022-05-20 RX ORDER — LORAZEPAM 0.5 MG/1
0.5 TABLET ORAL
Qty: 30 TABLET | Refills: 1 | Status: SHIPPED | OUTPATIENT
Start: 2022-05-20 | End: 2022-06-19

## 2022-05-20 RX ORDER — BUPROPION HYDROCHLORIDE 300 MG/1
300 TABLET ORAL EVERY MORNING
Qty: 30 TABLET | Refills: 1 | Status: SHIPPED | OUTPATIENT
Start: 2022-05-20 | End: 2022-08-26 | Stop reason: SDUPTHER

## 2022-05-20 RX ORDER — SERTRALINE HYDROCHLORIDE 100 MG/1
200 TABLET, FILM COATED ORAL DAILY
Qty: 60 TABLET | Refills: 1 | Status: SHIPPED | OUTPATIENT
Start: 2022-05-20 | End: 2022-08-26 | Stop reason: SDUPTHER

## 2022-05-20 RX ORDER — ZOLPIDEM TARTRATE 10 MG/1
10 TABLET ORAL NIGHTLY PRN
Qty: 30 TABLET | Refills: 1 | Status: SHIPPED | OUTPATIENT
Start: 2022-05-20 | End: 2022-06-19

## 2022-05-20 RX ORDER — PROPRANOLOL HYDROCHLORIDE 20 MG/1
20 TABLET ORAL 2 TIMES DAILY PRN
Qty: 60 TABLET | Refills: 1 | Status: SHIPPED | OUTPATIENT
Start: 2022-05-20 | End: 2022-08-26 | Stop reason: SDUPTHER
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
## 2022-05-23 ENCOUNTER — HOSPITAL ENCOUNTER (OUTPATIENT)
Dept: LAB | Facility: MEDICAL CENTER | Age: 47
End: 2022-05-23
Attending: PHYSICIAN ASSISTANT
Payer: OTHER GOVERNMENT

## 2022-05-23 DIAGNOSIS — Z00.00 PREVENTATIVE HEALTH CARE: ICD-10-CM

## 2022-05-23 DIAGNOSIS — R53.83 FATIGUE, UNSPECIFIED TYPE: ICD-10-CM

## 2022-05-23 LAB
ALBUMIN SERPL BCP-MCNC: 4.7 G/DL (ref 3.2–4.9)
ALBUMIN/GLOB SERPL: 1.6 G/DL
ALP SERPL-CCNC: 86 U/L (ref 30–99)
ALT SERPL-CCNC: 51 U/L (ref 2–50)
ANION GAP SERPL CALC-SCNC: 12 MMOL/L (ref 7–16)
AST SERPL-CCNC: 31 U/L (ref 12–45)
BASOPHILS # BLD AUTO: 0.5 % (ref 0–1.8)
BASOPHILS # BLD: 0.04 K/UL (ref 0–0.12)
BILIRUB SERPL-MCNC: 0.4 MG/DL (ref 0.1–1.5)
BUN SERPL-MCNC: 14 MG/DL (ref 8–22)
CALCIUM SERPL-MCNC: 9.7 MG/DL (ref 8.5–10.5)
CHLORIDE SERPL-SCNC: 99 MMOL/L (ref 96–112)
CHOLEST SERPL-MCNC: 243 MG/DL (ref 100–199)
CO2 SERPL-SCNC: 25 MMOL/L (ref 20–33)
CREAT SERPL-MCNC: 1.28 MG/DL (ref 0.5–1.4)
EOSINOPHIL # BLD AUTO: 0.12 K/UL (ref 0–0.51)
EOSINOPHIL NFR BLD: 1.5 % (ref 0–6.9)
ERYTHROCYTE [DISTWIDTH] IN BLOOD BY AUTOMATED COUNT: 35.8 FL (ref 35.9–50)
EST. AVERAGE GLUCOSE BLD GHB EST-MCNC: 117 MG/DL
FASTING STATUS PATIENT QL REPORTED: NORMAL
GFR SERPLBLD CREATININE-BSD FMLA CKD-EPI: 69 ML/MIN/1.73 M 2
GLOBULIN SER CALC-MCNC: 3 G/DL (ref 1.9–3.5)
GLUCOSE SERPL-MCNC: 101 MG/DL (ref 65–99)
HBA1C MFR BLD: 5.7 % (ref 4–5.6)
HCT VFR BLD AUTO: 47.4 % (ref 42–52)
HDLC SERPL-MCNC: 42 MG/DL
HGB BLD-MCNC: 16 G/DL (ref 14–18)
IMM GRANULOCYTES # BLD AUTO: 0.02 K/UL (ref 0–0.11)
IMM GRANULOCYTES NFR BLD AUTO: 0.3 % (ref 0–0.9)
LDLC SERPL CALC-MCNC: 163 MG/DL
LYMPHOCYTES # BLD AUTO: 2.03 K/UL (ref 1–4.8)
LYMPHOCYTES NFR BLD: 25.6 % (ref 22–41)
MCH RBC QN AUTO: 25 PG (ref 27–33)
MCHC RBC AUTO-ENTMCNC: 33.8 G/DL (ref 33.7–35.3)
MCV RBC AUTO: 73.9 FL (ref 81.4–97.8)
MONOCYTES # BLD AUTO: 0.91 K/UL (ref 0–0.85)
MONOCYTES NFR BLD AUTO: 11.5 % (ref 0–13.4)
NEUTROPHILS # BLD AUTO: 4.82 K/UL (ref 1.82–7.42)
NEUTROPHILS NFR BLD: 60.6 % (ref 44–72)
NRBC # BLD AUTO: 0 K/UL
NRBC BLD-RTO: 0 /100 WBC
PLATELET # BLD AUTO: 411 K/UL (ref 164–446)
PMV BLD AUTO: 9.1 FL (ref 9–12.9)
POTASSIUM SERPL-SCNC: 3.9 MMOL/L (ref 3.6–5.5)
PROT SERPL-MCNC: 7.7 G/DL (ref 6–8.2)
RBC # BLD AUTO: 6.41 M/UL (ref 4.7–6.1)
SODIUM SERPL-SCNC: 136 MMOL/L (ref 135–145)
TRIGL SERPL-MCNC: 189 MG/DL (ref 0–149)
TSH SERPL DL<=0.005 MIU/L-ACNC: 1.74 UIU/ML (ref 0.38–5.33)
WBC # BLD AUTO: 7.9 K/UL (ref 4.8–10.8)

## 2022-05-23 PROCEDURE — 84270 ASSAY OF SEX HORMONE GLOBUL: CPT

## 2022-05-23 PROCEDURE — 84443 ASSAY THYROID STIM HORMONE: CPT

## 2022-05-23 PROCEDURE — 82306 VITAMIN D 25 HYDROXY: CPT

## 2022-05-23 PROCEDURE — 84403 ASSAY OF TOTAL TESTOSTERONE: CPT

## 2022-05-23 PROCEDURE — 83036 HEMOGLOBIN GLYCOSYLATED A1C: CPT

## 2022-05-23 PROCEDURE — 36415 COLL VENOUS BLD VENIPUNCTURE: CPT

## 2022-05-23 PROCEDURE — 80053 COMPREHEN METABOLIC PANEL: CPT

## 2022-05-23 PROCEDURE — 80061 LIPID PANEL: CPT

## 2022-05-23 PROCEDURE — 85025 COMPLETE CBC W/AUTO DIFF WBC: CPT

## 2022-05-23 PROCEDURE — 84402 ASSAY OF FREE TESTOSTERONE: CPT

## 2022-05-25 ENCOUNTER — TELEMEDICINE (OUTPATIENT)
Dept: MEDICAL GROUP | Facility: MEDICAL CENTER | Age: 47
End: 2022-05-25
Payer: OTHER GOVERNMENT

## 2022-05-25 VITALS — WEIGHT: 210 LBS | HEIGHT: 73 IN | BODY MASS INDEX: 27.83 KG/M2

## 2022-05-25 DIAGNOSIS — R71.8 MICROCYTOSIS: ICD-10-CM

## 2022-05-25 DIAGNOSIS — G56.92 NEUROPATHY OF LEFT UPPER EXTREMITY: ICD-10-CM

## 2022-05-25 DIAGNOSIS — E78.2 MIXED HYPERLIPIDEMIA: ICD-10-CM

## 2022-05-25 DIAGNOSIS — F41.1 GAD (GENERALIZED ANXIETY DISORDER): ICD-10-CM

## 2022-05-25 LAB
SHBG SERPL-SCNC: 38 NMOL/L (ref 17–56)
TESTOST FREE MFR SERPL: 1.8 % (ref 1.6–2.9)
TESTOST FREE SERPL-MCNC: 92 PG/ML (ref 47–244)
TESTOST SERPL-MCNC: 524 NG/DL (ref 300–890)

## 2022-05-25 PROCEDURE — 99214 OFFICE O/P EST MOD 30 MIN: CPT | Mod: 95 | Performed by: PHYSICIAN ASSISTANT

## 2022-05-25 ASSESSMENT — FIBROSIS 4 INDEX: FIB4 SCORE: 0.51

## 2022-05-25 NOTE — ASSESSMENT & PLAN NOTE
Is currently taking propranolol and lieu of Xanax for his history of anxiety.  He is trying to manage his anxiety as best he can with the medication change.  Has taken Xanax rarely.  Took 1 last week.  Is on 20 mg twice daily as needed of propanolol.  Also currently on Zoloft 100 mg and prazosin prior to bedtime at 4 mg.

## 2022-05-25 NOTE — ASSESSMENT & PLAN NOTE
This is a pleasant 47-year-old male who is here today on a virtual visit.  Concerned about numbness of his left arm which occurs occasionally and typically while sitting at the computer.  Will have numbness of his fourth and fifth fingers.  Numbness that radiates down the elbow into the side of the hand.  At 1 point he had symptoms for about a week.  He was concerned the origin was from his heart.

## 2022-05-25 NOTE — ASSESSMENT & PLAN NOTE
Perform labs recently.  We are still waiting some of the results.  Total cholesterol was elevated.  LDL above 140.  He is taking atorvastatin 20 mg but does not take it on a regular basis.

## 2022-05-25 NOTE — ASSESSMENT & PLAN NOTE
He has had CBCs drawn 3 years apart.  1 recently showed an MCV at 73 and prior to that at 76.  Other lab markers in normal range.  WBC was elevated recently.  No history of anemia.

## 2022-05-25 NOTE — PROGRESS NOTES
Subjective:   Mike Rios is a 47 y.o. male here today for neuropathy of the left upper extremity, anxiety, hyperlipidemia and macrocytosis.    This evaluation was conducted via Zoom using secure and encrypted videoconferencing technology. The patient was in their home in the [facility].    The patient's identity was confirmed and verbal consent was obtained for this virtual visit.      Neuropathy of left upper extremity  This is a pleasant 47-year-old male who is here today on a virtual visit.  Concerned about numbness of his left arm which occurs occasionally and typically while sitting at the computer.  Will have numbness of his fourth and fifth fingers.  Numbness that radiates down the elbow into the side of the hand.  At 1 point he had symptoms for about a week.  He was concerned the origin was from his heart.    LAKHWINDER (generalized anxiety disorder)  Is currently taking propranolol and lieu of Xanax for his history of anxiety.  He is trying to manage his anxiety as best he can with the medication change.  Has taken Xanax rarely.  Took 1 last week.  Is on 20 mg twice daily as needed of propanolol.  Also currently on Zoloft 100 mg and prazosin prior to bedtime at 4 mg.    Mixed hyperlipidemia  Perform labs recently.  We are still waiting some of the results.  Total cholesterol was elevated.  LDL above 140.  He is taking atorvastatin 20 mg but does not take it on a regular basis.    Microcytosis  He has had CBCs drawn 3 years apart.  1 recently showed an MCV at 73 and prior to that at 76.  Other lab markers in normal range.  WBC was elevated recently.  No history of anemia.       Current medicines (including changes today)  Current Outpatient Medications   Medication Sig Dispense Refill   • buPROPion (WELLBUTRIN XL) 300 MG XL tablet Take 1 Tablet by mouth every morning. 30 Tablet 1   • propranolol (INDERAL) 20 MG Tab Take 1 Tablet by mouth 2 times a day as needed (anxiety). 60 Tablet 1   • sertraline  (ZOLOFT) 100 MG Tab Take 2 Tablets by mouth every day. 60 Tablet 1   • LORazepam (ATIVAN) 0.5 MG Tab Take 1 Tablet by mouth 1 time a day as needed (panic attacks only) for up to 30 days. 30 Tablet 1   • zolpidem (AMBIEN) 10 MG Tab Take 1 Tablet by mouth at bedtime as needed for Sleep for up to 30 days. 30 Tablet 1   • prazosin (MINIPRESS) 2 MG Cap TAKE 2 CAPSULES BY MOUTH EVERY EVENING 60 Capsule 1   • ondansetron (ZOFRAN) 4 MG Tab tablet Take 4 mg at the onset of nausea/aura/HA, to treat/prevent NAUSEA; may re-dose x1 after 2 hrs if HA persists; MDD: 8 mg; do not use >2 days/week. 20 Tablet 5   • sumatriptan (IMITREX) 100 MG tablet Take 100 mg by mouth as needed in the morning.     • meclizine (ANTIVERT) 25 MG Tab TAKE 1 TABLET BY MOUTH THREE TIMES DAILY AS NEEDED FOR VERTIGO 90 Tablet 1   • atorvastatin (LIPITOR) 20 MG Tab Take 1 Tablet by mouth every day. 90 Tablet 2   • finasteride (PROPECIA) 1 MG tablet Take 1 Tablet by mouth every day. 90 Tablet 2   • omeprazole (PRILOSEC) 20 MG delayed-release capsule Take 1 Capsule by mouth 2 times a day. 1/2 hour prior to same meal. 180 Capsule 2   • sildenafil citrate (VIAGRA) 100 MG tablet TAKE 1 TAB PO ONCE DAILY 30 MIN PRIOR TO ACTIVITY 9 Tablet 11   • albuterol 108 (90 Base) MCG/ACT Aero Soln inhalation aerosol Inhale 2 Puffs every four hours as needed. 18 g 11   • Hypromell-Glycerin-Naphazoline (CLEAR EYES FOR DRY EYES PLUS) 0.8-0.25-0.012 % Solution Administer 1 Drop into affected eye(s) every four hours as needed (Bilateral). 30 mL 11   • naproxen (NAPROSYN) 500 MG Tab TAKE 1 TABLET BY MOUTH TWICE DAILY WITH MEALS 60 Tablet 0   • cyclobenzaprine (FLEXERIL) 10 mg Tab Take 1 Tablet by mouth 3 times a day as needed. 60 Tablet 5   • DYMISTA 137-50 MCG/ACT Suspension Spray 2 Sprays in nose every day.  5     No current facility-administered medications for this visit.     He  has a past medical history of Back pain, Central sleep apnea, Depression, and FLIP (obstructive  "sleep apnea).    He has no past medical history of ASTHMA, Diabetes, Seizure (HCC), or Ulcer.    Social History and Family History were reviewed and updated.    ROS   No chest pain, no shortness of breath, no abdominal pain and all other systems were reviewed and are negative.       Objective:     Ht 1.854 m (6' 1\")   Wt 95.3 kg (210 lb)  Body mass index is 27.71 kg/m².   Physical Exam:  Constitutional: Alert, no distress.  Skin: Warm, dry, good turgor, no rashes in visible areas.  Eye: Equal, round and reactive, conjunctiva clear, lids normal.  ENMT: Lips without lesions, good dentition, oropharynx clear.  Neck: Trachea midline, no masses.   Lymph: No cervical or supraclavicular lymphadenopathy  Respiratory: Unlabored respiratory effort.  Psych: Alert and oriented x3, normal affect and mood.        Assessment and Plan:   The following treatment plan was discussed    1. Mixed hyperlipidemia  Chronic condition.  Uncontrolled.  Advised to take Lipitor 20 mg nightly.  Repeat cholesterol profile in 6 weeks.  If no improvement will increase to 40 mg.  - Lipid Profile; Future    2. LAKHWINDER (generalized anxiety disorder)  Chronic condition.  Stable.  Continue to follow with psychiatry.  Saw no appointment in the near future with psychiatry.  Continue Zoloft as directed.  Continue propanolol and lieu of alprazolam.    3. Microcytosis  Chronic condition but new condition noted in chart.  Does not appear to have anemia.  Will check ferritin and iron levels.  Thalassemia unlikely as well.  May consult with hematology in the future if needed.  - FERRITIN; Future  - IRON/TOTAL IRON BIND; Future    4. Neuropathy of left upper extremity  Acute, new onset condition.  Does not appear to have a true entrapment of his ulnar nerve.  Continue to avoid positions at his desk which causes the neuropathy.  Follow-up if his symptoms get worse for referral to PT.         Followup: Return in about 6 months (around 11/25/2022), or if symptoms " worsen or fail to improve.    Please note that this dictation was created using voice recognition software. I have made every reasonable attempt to correct obvious errors, but I expect that there are errors of grammar and possibly content that I did not discover before finalizing the note.

## 2022-05-26 LAB — 25(OH)D3 SERPL-MCNC: 15 NG/ML (ref 30–80)

## 2022-07-15 DIAGNOSIS — F43.12 CHRONIC POST-TRAUMATIC STRESS DISORDER (PTSD): ICD-10-CM

## 2022-07-15 RX ORDER — PRAZOSIN HYDROCHLORIDE 2 MG/1
4 CAPSULE ORAL NIGHTLY
Qty: 60 CAPSULE | Refills: 1 | Status: SHIPPED | OUTPATIENT
Start: 2022-07-15 | End: 2022-08-26 | Stop reason: SDUPTHER

## 2022-08-25 DIAGNOSIS — G44.51 HEMICRANIA CONTINUA: ICD-10-CM

## 2022-08-26 ENCOUNTER — TELEMEDICINE (OUTPATIENT)
Dept: BEHAVIORAL HEALTH | Facility: CLINIC | Age: 47
End: 2022-08-26
Payer: OTHER GOVERNMENT

## 2022-08-26 DIAGNOSIS — F41.1 GAD (GENERALIZED ANXIETY DISORDER): ICD-10-CM

## 2022-08-26 DIAGNOSIS — F43.12 CHRONIC POST-TRAUMATIC STRESS DISORDER (PTSD): ICD-10-CM

## 2022-08-26 DIAGNOSIS — F41.0 PANIC ATTACKS: ICD-10-CM

## 2022-08-26 DIAGNOSIS — G47.09 OTHER INSOMNIA: ICD-10-CM

## 2022-08-26 DIAGNOSIS — F33.2 SEVERE EPISODE OF RECURRENT MAJOR DEPRESSIVE DISORDER, WITHOUT PSYCHOTIC FEATURES (HCC): ICD-10-CM

## 2022-08-26 DIAGNOSIS — Z79.899 CHRONIC PRESCRIPTION BENZODIAZEPINE USE: ICD-10-CM

## 2022-08-26 PROCEDURE — 99214 OFFICE O/P EST MOD 30 MIN: CPT | Mod: GT | Performed by: PSYCHIATRY & NEUROLOGY

## 2022-08-26 RX ORDER — BUPROPION HYDROCHLORIDE 300 MG/1
300 TABLET ORAL EVERY MORNING
Qty: 90 TABLET | Refills: 1 | Status: SHIPPED | OUTPATIENT
Start: 2022-08-26 | End: 2022-12-07 | Stop reason: SDUPTHER

## 2022-08-26 RX ORDER — SERTRALINE HYDROCHLORIDE 100 MG/1
200 TABLET, FILM COATED ORAL DAILY
Qty: 180 TABLET | Refills: 1 | Status: SHIPPED | OUTPATIENT
Start: 2022-08-26 | End: 2022-12-07 | Stop reason: SDUPTHER

## 2022-08-26 RX ORDER — ZOLPIDEM TARTRATE 10 MG/1
10 TABLET ORAL NIGHTLY PRN
Qty: 30 TABLET | Refills: 2 | Status: SHIPPED | OUTPATIENT
Start: 2022-08-26 | End: 2022-09-25

## 2022-08-26 RX ORDER — LORAZEPAM 0.5 MG/1
0.5 TABLET ORAL
Qty: 30 TABLET | Refills: 2 | Status: SHIPPED | OUTPATIENT
Start: 2022-08-26 | End: 2022-09-25

## 2022-08-26 RX ORDER — PROPRANOLOL HYDROCHLORIDE 20 MG/1
TABLET ORAL
Qty: 180 TABLET | Refills: 1 | Status: SHIPPED | OUTPATIENT
Start: 2022-08-26 | End: 2022-12-07 | Stop reason: SDUPTHER

## 2022-08-26 RX ORDER — PROPRANOLOL HYDROCHLORIDE 20 MG/1
20 TABLET ORAL 2 TIMES DAILY PRN
Qty: 60 TABLET | Refills: 2 | Status: SHIPPED | OUTPATIENT
Start: 2022-08-26 | End: 2022-08-26

## 2022-08-26 RX ORDER — PRAZOSIN HYDROCHLORIDE 2 MG/1
4 CAPSULE ORAL NIGHTLY
Qty: 60 CAPSULE | Refills: 2 | Status: SHIPPED | OUTPATIENT
Start: 2022-08-26 | End: 2022-12-07 | Stop reason: SDUPTHER
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
## 2022-08-29 ENCOUNTER — TELEMEDICINE (OUTPATIENT)
Dept: MEDICAL GROUP | Facility: MEDICAL CENTER | Age: 47
End: 2022-08-29
Payer: OTHER GOVERNMENT

## 2022-08-29 VITALS — HEIGHT: 73 IN | BODY MASS INDEX: 27.83 KG/M2 | WEIGHT: 210 LBS | RESPIRATION RATE: 16 BRPM

## 2022-08-29 DIAGNOSIS — J30.89 CHRONIC NON-SEASONAL ALLERGIC RHINITIS: ICD-10-CM

## 2022-08-29 DIAGNOSIS — R71.8 MICROCYTOSIS: ICD-10-CM

## 2022-08-29 DIAGNOSIS — G43.109 MIGRAINE WITH AURA AND WITHOUT STATUS MIGRAINOSUS, NOT INTRACTABLE: ICD-10-CM

## 2022-08-29 DIAGNOSIS — H04.123 BILATERAL DRY EYES: ICD-10-CM

## 2022-08-29 DIAGNOSIS — E78.2 MIXED HYPERLIPIDEMIA: ICD-10-CM

## 2022-08-29 PROCEDURE — 99214 OFFICE O/P EST MOD 30 MIN: CPT | Mod: 95 | Performed by: PHYSICIAN ASSISTANT

## 2022-08-29 RX ORDER — SUMATRIPTAN 100 MG/1
TABLET, FILM COATED ORAL
Qty: 9 TABLET | Refills: 11 | Status: SHIPPED | OUTPATIENT
Start: 2022-08-29 | End: 2022-09-28

## 2022-08-29 RX ORDER — AZELASTINE HYDROCHLORIDE AND FLUTICASONE PROPIONATE 137; 50 UG/1; UG/1
2 SPRAY, METERED NASAL DAILY
Qty: 23 G | Refills: 5 | Status: SHIPPED | OUTPATIENT
Start: 2022-08-29

## 2022-08-29 RX ORDER — QUETIAPINE FUMARATE 25 MG/1
25 TABLET, FILM COATED ORAL
COMMUNITY
Start: 2022-08-26 | End: 2022-08-29

## 2022-08-29 ASSESSMENT — FIBROSIS 4 INDEX: FIB4 SCORE: 0.51

## 2022-08-29 NOTE — ASSESSMENT & PLAN NOTE
This is a pleasant 47-year-old male here today to follow-up on his health.  Recently seen by his psychiatrist.  Reviewed his medications list.  Believes his depression is stable.  Does deal with fatigue on a daily basis.  MCV in the past has been low.  H&H in normal range.  Ferritin and iron level were not performed the other month.

## 2022-08-29 NOTE — PROGRESS NOTES
Subjective:   Mike Rios is a 47 y.o. male here today for microcytosis, mixed hyperlipidemia and other chronic medical conditions.    This evaluation was conducted via Zoom using secure and encrypted videoconferencing technology. The patient was in their home in the [facility].    The patient's identity was confirmed and verbal consent was obtained for this virtual visit.      Microcytosis  This is a pleasant 47-year-old male here today to follow-up on his health.  Recently seen by his psychiatrist.  Reviewed his medications list.  Believes his depression is stable.  Does deal with fatigue on a daily basis.  MCV in the past has been low.  H&H in normal range.  Ferritin and iron level were not performed the other month.    Mixed hyperlipidemia  Chronic condition.  Last cholesterol profile was very elevated.  LDL in the 160s.  On atorvastatin 20 mg.  Family history of heart disease with his father.  He is requesting some medications to be renewed.       Current medicines (including changes today)  Current Outpatient Medications   Medication Sig Dispense Refill    Hypromell-Glycerin-Naphazoline (CLEAR EYES FOR DRY EYES PLUS) 0.8-0.25-0.012 % Solution Administer 1 Drop into affected eye(s) every four hours as needed (Bilateral). 30 mL 11    sumatriptan (IMITREX) 100 MG tablet TAKE 1 TABLET BY MOUTH 1 TIME AS NEEDED FOR MIGRAINE. MAY TAKE SECOND TABLET 2 HOURS AFTER IF STILL AT ONSET OF HEADACHE 9 Tablet 11    DYMISTA 137-50 MCG/ACT Suspension Administer 2 Sprays into affected nostril(S) every day. 23 g 5    buPROPion (WELLBUTRIN XL) 300 MG XL tablet Take 1 Tablet by mouth every morning. 90 Tablet 1    prazosin (MINIPRESS) 2 MG Cap Take 2 Capsules by mouth every evening. 60 Capsule 2    sertraline (ZOLOFT) 100 MG Tab Take 2 Tablets by mouth every day. 180 Tablet 1    zolpidem (AMBIEN) 10 MG Tab Take 1 Tablet by mouth at bedtime as needed for Sleep for up to 30 days. 30 Tablet 2    LORazepam (ATIVAN) 0.5 MG  "Tab Take 1 Tablet by mouth 1 time a day as needed (for panic attacks only) for up to 30 days. 30 Tablet 2    propranolol (INDERAL) 20 MG Tab TAKE 1 TABLET BY MOUTH TWICE DAILY AS NEEDED FOR ANXIETY 180 Tablet 1    meclizine (ANTIVERT) 25 MG Tab TAKE 1 TABLET BY MOUTH THREE TIMES DAILY AS NEEDED FOR VERTIGO 90 Tablet 1    atorvastatin (LIPITOR) 20 MG Tab Take 1 Tablet by mouth every day. 90 Tablet 2    finasteride (PROPECIA) 1 MG tablet Take 1 Tablet by mouth every day. 90 Tablet 2    omeprazole (PRILOSEC) 20 MG delayed-release capsule Take 1 Capsule by mouth 2 times a day. 1/2 hour prior to same meal. 180 Capsule 2    sildenafil citrate (VIAGRA) 100 MG tablet TAKE 1 TAB PO ONCE DAILY 30 MIN PRIOR TO ACTIVITY 9 Tablet 11    albuterol 108 (90 Base) MCG/ACT Aero Soln inhalation aerosol Inhale 2 Puffs every four hours as needed. 18 g 11    naproxen (NAPROSYN) 500 MG Tab TAKE 1 TABLET BY MOUTH TWICE DAILY WITH MEALS 60 Tablet 0    cyclobenzaprine (FLEXERIL) 10 mg Tab Take 1 Tablet by mouth 3 times a day as needed. 60 Tablet 5     No current facility-administered medications for this visit.     He  has a past medical history of Back pain, Central sleep apnea, Depression, and FLIP (obstructive sleep apnea).    He has no past medical history of ASTHMA, Diabetes, Seizure (HCC), or Ulcer.    Social History and Family History were reviewed and updated.    ROS   No chest pain, no shortness of breath, no abdominal pain and all other systems were reviewed and are negative.       Objective:     Resp 16   Ht 1.854 m (6' 1\")   Wt 95.3 kg (210 lb)  Body mass index is 27.71 kg/m².   Physical Exam:  Constitutional: Alert, no distress.  Skin: Warm, dry, good turgor, no rashes in visible areas.  Eye: Equal, round and reactive, conjunctiva clear, lids normal.  ENMT: Lips without lesions, good dentition, oropharynx clear.  Neck: Trachea midline, no masses.   Lymph: No cervical or supraclavicular lymphadenopathy  Respiratory: Unlabored " respiratory effort, lungs appear clear.  Psych: Alert and oriented x3, normal affect and mood.        Assessment and Plan:   The following treatment plan was discussed    1. Microcytosis  Chronic condition.  Unknown cause.  Ordered CBC, iron and ferritin level.  May perform nonfasting.  - FERRITIN; Future  - IRON/TOTAL IRON BIND; Future  - CBC WITH DIFFERENTIAL; Future    2. Mixed hyperlipidemia  Chronic condition.  Before changing dosing of atorvastatin will check CT cardiac scoring to see if there is risk for plaque.  Contact imaging for an appointment.  Discussed cash pay price of $110.  - CT-CARDIAC SCORING; Future    3. Bilateral dry eyes  Chronic condition.  Stable.  Renewed clear eye solution for dry eyes.  - Hypromell-Glycerin-Naphazoline (CLEAR EYES FOR DRY EYES PLUS) 0.8-0.25-0.012 % Solution; Administer 1 Drop into affected eye(s) every four hours as needed (Bilateral).  Dispense: 30 mL; Refill: 11    4. Migraine with aura and without status migrainosus, not intractable  Chronic condition.  Stable.  Renewed Imitrex as directed.  - sumatriptan (IMITREX) 100 MG tablet; TAKE 1 TABLET BY MOUTH 1 TIME AS NEEDED FOR MIGRAINE. MAY TAKE SECOND TABLET 2 HOURS AFTER IF STILL AT ONSET OF HEADACHE  Dispense: 9 Tablet; Refill: 11    5. Chronic non-seasonal allergic rhinitis  Chronic condition.  Provided Dymista as directed.  - DYMISTA 137-50 MCG/ACT Suspension; Administer 2 Sprays into affected nostril(S) every day.  Dispense: 23 g; Refill: 5         Followup: Return in about 3 months (around 11/29/2022), or if symptoms worsen or fail to improve.    Please note that this dictation was created using voice recognition software. I have made every reasonable attempt to correct obvious errors, but I expect that there are errors of grammar and possibly content that I did not discover before finalizing the note.

## 2022-08-29 NOTE — ASSESSMENT & PLAN NOTE
Chronic condition.  Last cholesterol profile was very elevated.  LDL in the 160s.  On atorvastatin 20 mg.  Family history of heart disease with his father.  He is requesting some medications to be renewed.

## 2022-08-30 RX ORDER — INDOMETHACIN 25 MG/1
CAPSULE ORAL
Qty: 54 CAPSULE | Refills: 0 | Status: SHIPPED | OUTPATIENT
Start: 2022-08-30

## 2022-09-02 ENCOUNTER — HOSPITAL ENCOUNTER (OUTPATIENT)
Dept: RADIOLOGY | Facility: MEDICAL CENTER | Age: 47
End: 2022-09-02
Attending: PHYSICIAN ASSISTANT
Payer: COMMERCIAL

## 2022-09-02 DIAGNOSIS — E78.2 MIXED HYPERLIPIDEMIA: ICD-10-CM

## 2022-09-02 PROBLEM — R93.1 AGATSTON CORONARY ARTERY CALCIUM SCORE LESS THAN 100: Status: ACTIVE | Noted: 2022-09-02

## 2022-09-02 PROCEDURE — 4410556 CT-CARDIAC SCORING (SELF PAY ONLY)

## 2022-11-18 ENCOUNTER — TELEMEDICINE (OUTPATIENT)
Dept: MEDICAL GROUP | Facility: MEDICAL CENTER | Age: 47
End: 2022-11-18
Payer: OTHER GOVERNMENT

## 2022-11-18 VITALS — HEIGHT: 73 IN | BODY MASS INDEX: 27.83 KG/M2 | WEIGHT: 210 LBS

## 2022-11-18 DIAGNOSIS — Z11.59 ENCOUNTER FOR HEPATITIS C SCREENING TEST FOR LOW RISK PATIENT: ICD-10-CM

## 2022-11-18 DIAGNOSIS — E78.2 MIXED HYPERLIPIDEMIA: ICD-10-CM

## 2022-11-18 DIAGNOSIS — R71.8 MICROCYTOSIS: ICD-10-CM

## 2022-11-18 DIAGNOSIS — Z12.11 COLON CANCER SCREENING: ICD-10-CM

## 2022-11-18 DIAGNOSIS — R73.03 PREDIABETES: ICD-10-CM

## 2022-11-18 DIAGNOSIS — M79.644 THUMB PAIN, RIGHT: ICD-10-CM

## 2022-11-18 PROCEDURE — 99214 OFFICE O/P EST MOD 30 MIN: CPT | Mod: 95 | Performed by: PHYSICIAN ASSISTANT

## 2022-11-18 RX ORDER — SUMATRIPTAN 100 MG/1
TABLET, FILM COATED ORAL
COMMUNITY
Start: 2022-11-15 | End: 2023-09-01

## 2022-11-18 RX ORDER — LORAZEPAM 0.5 MG/1
TABLET ORAL
COMMUNITY
Start: 2022-11-15 | End: 2023-07-13 | Stop reason: SDUPTHER

## 2022-11-18 RX ORDER — ATORVASTATIN CALCIUM 20 MG/1
20 TABLET, FILM COATED ORAL
Qty: 90 TABLET | Refills: 2 | Status: SHIPPED | OUTPATIENT
Start: 2022-11-18 | End: 2023-06-05

## 2022-11-18 RX ORDER — ZOLPIDEM TARTRATE 10 MG/1
1 TABLET ORAL NIGHTLY
COMMUNITY
Start: 2022-11-15 | End: 2023-07-13 | Stop reason: SDUPTHER

## 2022-11-18 ASSESSMENT — FIBROSIS 4 INDEX: FIB4 SCORE: 0.51

## 2022-11-18 NOTE — ASSESSMENT & PLAN NOTE
Complains also of developing over the past couple weeks right thumb pain.  At the base of the thumb.  Feels like there is a mass.  Does have a history of carpal tunnel but does not believe that this is a cause from carpal tunnel.  Denies trauma.

## 2022-11-18 NOTE — ASSESSMENT & PLAN NOTE
This is a pleasant 47-year-old male in a virtual visit today.  He is taking atorvastatin 20 mg daily.  CT calcium score was in the greater than 50% range.  He is to have his labs repeated.

## 2022-11-18 NOTE — TELEPHONE ENCOUNTER
Received request via: Pharmacy    Was the patient seen in the last year in this department? Yes    Does the patient have an active prescription (recently filled or refills available) for medication(s) requested? No    Does the patient have [facility] and need 100 day supply (blood pressure, diabetes and cholesterol meds only)? Patient does not have SCP

## 2022-11-18 NOTE — PROGRESS NOTES
Subjective:   Mike Rios is a 47 y.o. male here today for hyperlipidemia and acute right thumb pain.    This evaluation was conducted via Zoom using secure and encrypted videoconferencing technology. The patient was in their home in the [facility].    The patient's identity was confirmed and verbal consent was obtained for this virtual visit.      Mixed hyperlipidemia  This is a pleasant 47-year-old male in a virtual visit today.  He is taking atorvastatin 20 mg daily.  CT calcium score was in the greater than 50% range.  He is to have his labs repeated.    Thumb pain, right  Complains also of developing over the past couple weeks right thumb pain.  At the base of the thumb.  Feels like there is a mass.  Does have a history of carpal tunnel but does not believe that this is a cause from carpal tunnel.  Denies trauma.     Current medicines (including changes today)  Current Outpatient Medications   Medication Sig Dispense Refill    atorvastatin (LIPITOR) 20 MG Tab TAKE 1 TABLET BY MOUTH EVERY DAY 90 Tablet 2    zolpidem (AMBIEN) 10 MG Tab Take 1 Tablet by mouth every evening.      sumatriptan (IMITREX) 100 MG tablet TAKE 1 TABLET BY MOUTH 1 TIME AS NEEDED FOR MIGRAINE. MAY TAKE SECOND TABLET 2 HOURS AFTER IF STILL AT ONSET OF HEADACHE      LORazepam (ATIVAN) 0.5 MG Tab       finasteride (PROSCAR) 5 MG Tab TAKE 1 TABLET BY MOUTH ONCE DAILY 90 Tablet 3    indomethacin (INDOCIN) 25 MG Cap TAKE 1 CAPSULE BY MOUTH THREE TIMES DAILY FOR 3 DAYS THEN 2 CAPSULES THREE TIMES DAILY FOR 3 DAYS THEN 3 CAPSULES THREE TIMES DAILY FOR 3 DAYS 54 Capsule 0    Hypromell-Glycerin-Naphazoline (CLEAR EYES FOR DRY EYES PLUS) 0.8-0.25-0.012 % Solution Administer 1 Drop into affected eye(s) every four hours as needed (Bilateral). 30 mL 11    DYMISTA 137-50 MCG/ACT Suspension Administer 2 Sprays into affected nostril(S) every day. 23 g 5    buPROPion (WELLBUTRIN XL) 300 MG XL tablet Take 1 Tablet by mouth every morning. 90  "Tablet 1    prazosin (MINIPRESS) 2 MG Cap Take 2 Capsules by mouth every evening. 60 Capsule 2    sertraline (ZOLOFT) 100 MG Tab Take 2 Tablets by mouth every day. 180 Tablet 1    propranolol (INDERAL) 20 MG Tab TAKE 1 TABLET BY MOUTH TWICE DAILY AS NEEDED FOR ANXIETY 180 Tablet 1    meclizine (ANTIVERT) 25 MG Tab TAKE 1 TABLET BY MOUTH THREE TIMES DAILY AS NEEDED FOR VERTIGO 90 Tablet 1    finasteride (PROPECIA) 1 MG tablet Take 1 Tablet by mouth every day. 90 Tablet 2    omeprazole (PRILOSEC) 20 MG delayed-release capsule Take 1 Capsule by mouth 2 times a day. 1/2 hour prior to same meal. 180 Capsule 2    sildenafil citrate (VIAGRA) 100 MG tablet TAKE 1 TAB PO ONCE DAILY 30 MIN PRIOR TO ACTIVITY 9 Tablet 11    albuterol 108 (90 Base) MCG/ACT Aero Soln inhalation aerosol Inhale 2 Puffs every four hours as needed. 18 g 11    naproxen (NAPROSYN) 500 MG Tab TAKE 1 TABLET BY MOUTH TWICE DAILY WITH MEALS 60 Tablet 0    cyclobenzaprine (FLEXERIL) 10 mg Tab Take 1 Tablet by mouth 3 times a day as needed. 60 Tablet 5     No current facility-administered medications for this visit.     He  has a past medical history of Back pain, Central sleep apnea, Depression, and FLIP (obstructive sleep apnea).    He has no past medical history of ASTHMA, Diabetes, Seizure (HCC), or Ulcer.    Social History and Family History were reviewed and updated.    ROS   No chest pain, no shortness of breath, no abdominal pain and all other systems were reviewed and are negative.       Objective:     Ht 1.854 m (6' 1\")   Wt 95.3 kg (210 lb)  Body mass index is 27.71 kg/m².   Physical Exam:  Constitutional: Alert, no distress.  Skin: Warm, dry, good turgor, no rashes in visible areas.  Eye: Equal, round and reactive, conjunctiva clear, lids normal.  ENMT: Lips without lesions, good dentition, oropharynx clear.  Neck: Trachea midline, no masses.   Lymph: No cervical or supraclavicular lymphadenopathy  Respiratory: Unlabored respiratory effort, lungs " appear clear.  Psych: Alert and oriented x3, normal affect and mood.        Assessment and Plan:   The following treatment plan was discussed    1. Mixed hyperlipidemia  Chronic condition.  Continue medication.  Ordered labs.  Fast 8 hours.  Discussed CT cardiac score results.  - Lipid Profile; Future  - Comp Metabolic Panel; Future    2. Microcytosis  Chronic condition.  Unknown cause.  Ordered labs.   - FERRITIN; Future  - IRON/TOTAL IRON BIND; Future  - CBC WITH DIFFERENTIAL; Future    3. Prediabetes  Chronic condition.  Last A1c at 5.7.  Stable but we ordered A1c.  - HEMOGLOBIN A1C; Future    4. Thumb pain, right  Acute, new onset condition for unknown cause.  Unable to perform physical examination today.  Referred to [facility] and Dr. Farias.  - Referral to Hand Surgery    5. Colon cancer screening  Refer to [facility] for colon cancer screening.  - Referral to GI for Colonoscopy    6. Encounter for hepatitis C screening test for low risk patient  Hep C viral antibody ordered.  Low risk.  - HEP C VIRUS ANTIBODY; Future         Followup: Return in about 6 months (around 5/18/2023), or if symptoms worsen or fail to improve.    Please note that this dictation was created using voice recognition software. I have made every reasonable attempt to correct obvious errors, but I expect that there are errors of grammar and possibly content that I did not discover before finalizing the note.

## 2022-12-07 ENCOUNTER — TELEMEDICINE (OUTPATIENT)
Dept: BEHAVIORAL HEALTH | Facility: CLINIC | Age: 47
End: 2022-12-07
Payer: OTHER GOVERNMENT

## 2022-12-07 DIAGNOSIS — Z79.899 CHRONIC PRESCRIPTION BENZODIAZEPINE USE: ICD-10-CM

## 2022-12-07 DIAGNOSIS — G47.09 OTHER INSOMNIA: ICD-10-CM

## 2022-12-07 DIAGNOSIS — F33.42 RECURRENT MAJOR DEPRESSIVE DISORDER, IN FULL REMISSION (HCC): ICD-10-CM

## 2022-12-07 DIAGNOSIS — F41.1 GAD (GENERALIZED ANXIETY DISORDER): ICD-10-CM

## 2022-12-07 DIAGNOSIS — F41.0 PANIC ATTACKS: ICD-10-CM

## 2022-12-07 DIAGNOSIS — F43.12 CHRONIC POST-TRAUMATIC STRESS DISORDER (PTSD): ICD-10-CM

## 2022-12-07 DIAGNOSIS — F33.2 SEVERE EPISODE OF RECURRENT MAJOR DEPRESSIVE DISORDER, WITHOUT PSYCHOTIC FEATURES (HCC): ICD-10-CM

## 2022-12-07 PROCEDURE — 99214 OFFICE O/P EST MOD 30 MIN: CPT | Mod: 95 | Performed by: PSYCHIATRY & NEUROLOGY

## 2022-12-07 RX ORDER — BUPROPION HYDROCHLORIDE 300 MG/1
300 TABLET ORAL EVERY MORNING
Qty: 90 TABLET | Refills: 2 | Status: SHIPPED | OUTPATIENT
Start: 2022-12-07 | End: 2023-07-13 | Stop reason: SDUPTHER

## 2022-12-07 RX ORDER — PRAZOSIN HYDROCHLORIDE 2 MG/1
4 CAPSULE ORAL NIGHTLY
Qty: 180 CAPSULE | Refills: 2 | Status: SHIPPED | OUTPATIENT
Start: 2022-12-07 | End: 2023-07-13 | Stop reason: SDUPTHER

## 2022-12-07 RX ORDER — PROPRANOLOL HYDROCHLORIDE 20 MG/1
TABLET ORAL
Qty: 180 TABLET | Refills: 2 | Status: SHIPPED | OUTPATIENT
Start: 2022-12-07 | End: 2023-07-13 | Stop reason: SDUPTHER

## 2022-12-07 RX ORDER — ZOLPIDEM TARTRATE 10 MG/1
10 TABLET ORAL NIGHTLY PRN
Qty: 30 TABLET | Refills: 5 | Status: SHIPPED | OUTPATIENT
Start: 2022-12-16 | End: 2023-01-15

## 2022-12-07 RX ORDER — LORAZEPAM 0.5 MG/1
0.5 TABLET ORAL EVERY 4 HOURS PRN
Qty: 30 TABLET | Refills: 5 | Status: SHIPPED | OUTPATIENT
Start: 2022-12-16 | End: 2023-01-15

## 2022-12-07 RX ORDER — SERTRALINE HYDROCHLORIDE 100 MG/1
200 TABLET, FILM COATED ORAL DAILY
Qty: 180 TABLET | Refills: 2 | Status: SHIPPED | OUTPATIENT
Start: 2022-12-07 | End: 2023-07-13 | Stop reason: SDUPTHER
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
## 2023-01-06 ENCOUNTER — TELEMEDICINE (OUTPATIENT)
Dept: MEDICAL GROUP | Facility: MEDICAL CENTER | Age: 48
End: 2023-01-06
Payer: OTHER GOVERNMENT

## 2023-01-06 VITALS — WEIGHT: 210 LBS | HEIGHT: 73 IN | BODY MASS INDEX: 27.83 KG/M2

## 2023-01-06 DIAGNOSIS — R04.2 COUGHING UP BLOOD: ICD-10-CM

## 2023-01-06 DIAGNOSIS — U07.1 COVID-19 VIRUS INFECTION: ICD-10-CM

## 2023-01-06 PROCEDURE — 99214 OFFICE O/P EST MOD 30 MIN: CPT | Mod: 95 | Performed by: PHYSICIAN ASSISTANT

## 2023-01-06 ASSESSMENT — FIBROSIS 4 INDEX: FIB4 SCORE: 0.51

## 2023-01-06 NOTE — PROGRESS NOTES
Subjective:   Mike Rios is a 47 y.o. male here today for coughing up blood with recent diagnosis of COVID.    This evaluation was conducted via Zoom using secure and encrypted videoconferencing technology. The patient was in their home in the [facility].    The patient's identity was confirmed and verbal consent was obtained for this virtual visit.      COVID-19 virus infection  This is a pleasant 48-year-old male in a virtual visit today.  His wife contracted COVID earlier this week.  He also tested positive recently.  He has been coughing.  He has coughed up blood 4 times.  A small amount.  Did not think anything of it initially but it has occurred 3 more times.  He denies any current fevers.  No chest pain or shortness of breath.  Does have some shortness of breath when walking up his stairs.  He denies any dizziness.  No weakness.  He denies any melena.  No change in bowel habits.  Does have a history of reflux and is on omeprazole daily.  Taking the medication daily.  Does take naproxen daily but not in excess quantity daily.       Current medicines (including changes today)  Current Outpatient Medications   Medication Sig Dispense Refill    buPROPion (WELLBUTRIN XL) 300 MG XL tablet Take 1 Tablet by mouth every morning. 90 Tablet 2    prazosin (MINIPRESS) 2 MG Cap Take 2 Capsules by mouth every evening. 180 Capsule 2    sertraline (ZOLOFT) 100 MG Tab Take 2 Tablets by mouth every day. 180 Tablet 2    propranolol (INDERAL) 20 MG Tab TAKE 1 TABLET BY MOUTH TWICE DAILY AS NEEDED FOR ANXIETY Strength: 20 mg 180 Tablet 2    zolpidem (AMBIEN) 10 MG Tab Take 1 Tablet by mouth at bedtime as needed for Sleep for up to 30 days. 30 Tablet 5    LORazepam (ATIVAN) 0.5 MG Tab Take 1 Tablet by mouth every four hours as needed for Anxiety for up to 30 days. 30 Tablet 5    atorvastatin (LIPITOR) 20 MG Tab TAKE 1 TABLET BY MOUTH EVERY DAY 90 Tablet 2    zolpidem (AMBIEN) 10 MG Tab Take 1 Tablet by mouth every  evening.      sumatriptan (IMITREX) 100 MG tablet TAKE 1 TABLET BY MOUTH 1 TIME AS NEEDED FOR MIGRAINE. MAY TAKE SECOND TABLET 2 HOURS AFTER IF STILL AT ONSET OF HEADACHE      LORazepam (ATIVAN) 0.5 MG Tab       finasteride (PROSCAR) 5 MG Tab TAKE 1 TABLET BY MOUTH ONCE DAILY 90 Tablet 3    indomethacin (INDOCIN) 25 MG Cap TAKE 1 CAPSULE BY MOUTH THREE TIMES DAILY FOR 3 DAYS THEN 2 CAPSULES THREE TIMES DAILY FOR 3 DAYS THEN 3 CAPSULES THREE TIMES DAILY FOR 3 DAYS 54 Capsule 0    Hypromell-Glycerin-Naphazoline (CLEAR EYES FOR DRY EYES PLUS) 0.8-0.25-0.012 % Solution Administer 1 Drop into affected eye(s) every four hours as needed (Bilateral). 30 mL 11    DYMISTA 137-50 MCG/ACT Suspension Administer 2 Sprays into affected nostril(S) every day. 23 g 5    meclizine (ANTIVERT) 25 MG Tab TAKE 1 TABLET BY MOUTH THREE TIMES DAILY AS NEEDED FOR VERTIGO 90 Tablet 1    finasteride (PROPECIA) 1 MG tablet Take 1 Tablet by mouth every day. 90 Tablet 2    omeprazole (PRILOSEC) 20 MG delayed-release capsule Take 1 Capsule by mouth 2 times a day. 1/2 hour prior to same meal. 180 Capsule 2    sildenafil citrate (VIAGRA) 100 MG tablet TAKE 1 TAB PO ONCE DAILY 30 MIN PRIOR TO ACTIVITY 9 Tablet 11    albuterol 108 (90 Base) MCG/ACT Aero Soln inhalation aerosol Inhale 2 Puffs every four hours as needed. 18 g 11    naproxen (NAPROSYN) 500 MG Tab TAKE 1 TABLET BY MOUTH TWICE DAILY WITH MEALS 60 Tablet 0    cyclobenzaprine (FLEXERIL) 10 mg Tab Take 1 Tablet by mouth 3 times a day as needed. 60 Tablet 5     No current facility-administered medications for this visit.     He  has a past medical history of Back pain, Central sleep apnea, Depression, and FLIP (obstructive sleep apnea).    He has no past medical history of ASTHMA, Diabetes, Seizure (HCC), or Ulcer.    Social History and Family History were reviewed and updated.    ROS   No chest pain, no shortness of breath, no abdominal pain and all other systems were reviewed and are  "negative.       Objective:     Ht 1.854 m (6' 1\")   Wt 95.3 kg (210 lb)  Body mass index is 27.71 kg/m².   Physical Exam:  Constitutional: Alert, no distress.  Skin: Warm, dry, good turgor, no rashes in visible areas.  Eye: Equal, round and reactive, conjunctiva clear, lids normal.  ENMT: Lips without lesions, good dentition, oropharynx clear.  Neck: Trachea midline, no masses.   Lymph: No cervical or supraclavicular lymphadenopathy  Respiratory: Unlabored respiratory effort, lungs appear clear.  Psych: Alert and oriented x3, normal affect and mood.        Assessment and Plan:   The following treatment plan was discussed    1. Coughing up blood  Acute, new onset condition.  Scant amount of blood noted during each occasion.  He has no red flags such as melena.  Is on omeprazole.  New to monitor symptoms.  Advised if they do continue he will need to go to the ER.  Did order CBC and CMP.  These were nonfasting.  Do expect his symptoms to improve.  - CBC WITH DIFFERENTIAL; Future  - Comp Metabolic Panel; Future    2. COVID-19 virus infection  Acute, new onset condition.  Stable.  Its been at least 4 to 5 days of symptoms.  He appears well-appearing on the monitor.  I expect in time he will improve.         Followup: No follow-ups on file.    Please note that this dictation was created using voice recognition software. I have made every reasonable attempt to correct obvious errors, but I expect that there are errors of grammar and possibly content that I did not discover before finalizing the note.             "

## 2023-01-06 NOTE — ASSESSMENT & PLAN NOTE
This is a pleasant 48-year-old male in a virtual visit today.  His wife contracted COVID earlier this week.  He also tested positive recently.  He has been coughing.  He has coughed up blood 4 times.  A small amount.  Did not think anything of it initially but it has occurred 3 more times.  He denies any current fevers.  No chest pain or shortness of breath.  Does have some shortness of breath when walking up his stairs.  He denies any dizziness.  No weakness.  He denies any melena.  No change in bowel habits.  Does have a history of reflux and is on omeprazole daily.  Taking the medication daily.  Does take naproxen daily but not in excess quantity daily.

## 2023-01-13 ENCOUNTER — TELEMEDICINE (OUTPATIENT)
Dept: MEDICAL GROUP | Facility: MEDICAL CENTER | Age: 48
End: 2023-01-13
Payer: OTHER GOVERNMENT

## 2023-01-13 VITALS — HEIGHT: 73 IN | WEIGHT: 210 LBS | BODY MASS INDEX: 27.83 KG/M2

## 2023-01-13 DIAGNOSIS — U07.1 COVID-19 VIRUS INFECTION: ICD-10-CM

## 2023-01-13 DIAGNOSIS — R05.1 ACUTE COUGH: ICD-10-CM

## 2023-01-13 DIAGNOSIS — G89.29 CHRONIC LEFT SHOULDER PAIN: ICD-10-CM

## 2023-01-13 DIAGNOSIS — M25.512 CHRONIC LEFT SHOULDER PAIN: ICD-10-CM

## 2023-01-13 PROBLEM — R04.2 COUGHING UP BLOOD: Status: RESOLVED | Noted: 2023-01-06 | Resolved: 2023-01-13

## 2023-01-13 PROCEDURE — 99214 OFFICE O/P EST MOD 30 MIN: CPT | Mod: 95 | Performed by: PHYSICIAN ASSISTANT

## 2023-01-13 ASSESSMENT — FIBROSIS 4 INDEX: FIB4 SCORE: 0.51

## 2023-01-13 NOTE — ASSESSMENT & PLAN NOTE
This is a pleasant 47-year-old male who I saw last week for his COVID infection.  Saw him virtually.  He tested at home positive for COVID.  Is doing better with symptoms but still has a cough.  He is not coughing up blood any longer.  Complains of a dry cough.  Recently coughed and developed left shoulder pain.  Does have left shoulder pain anyway.  Pain radiated down the shoulder.  He has been taking over-the-counter cold medications and tried also cough syrups over-the-counter without any improvement.  He is requesting something prescribed.  Denies any chest pain or shortness of breath.  No fevers.

## 2023-01-13 NOTE — PROGRESS NOTES
Subjective:   Mike Rios is a 47 y.o. male here today for cough secondary to COVID and exacerbation of left shoulder pain.    This evaluation was conducted via Zoom using secure and encrypted videoconferencing technology. The patient was in their home in the [facility].    The patient's identity was confirmed and verbal consent was obtained for this virtual visit.      Cough  This is a pleasant 47-year-old male who I saw last week for his COVID infection.  Saw him virtually.  He tested at home positive for COVID.  Is doing better with symptoms but still has a cough.  He is not coughing up blood any longer.  Complains of a dry cough.  Recently coughed and developed left shoulder pain.  Does have left shoulder pain anyway.  Pain radiated down the shoulder.  He has been taking over-the-counter cold medications and tried also cough syrups over-the-counter without any improvement.  He is requesting something prescribed.  Denies any chest pain or shortness of breath.  No fevers.       Current medicines (including changes today)  Current Outpatient Medications   Medication Sig Dispense Refill    Hydrocod Polst-CPM Polst ER (TUSSIONEX) 10-8 MG/5ML Suspension Extended Release Take 5 mL by mouth every 12 hours for 14 days. 140 mL 0    buPROPion (WELLBUTRIN XL) 300 MG XL tablet Take 1 Tablet by mouth every morning. 90 Tablet 2    prazosin (MINIPRESS) 2 MG Cap Take 2 Capsules by mouth every evening. 180 Capsule 2    sertraline (ZOLOFT) 100 MG Tab Take 2 Tablets by mouth every day. 180 Tablet 2    propranolol (INDERAL) 20 MG Tab TAKE 1 TABLET BY MOUTH TWICE DAILY AS NEEDED FOR ANXIETY Strength: 20 mg 180 Tablet 2    zolpidem (AMBIEN) 10 MG Tab Take 1 Tablet by mouth at bedtime as needed for Sleep for up to 30 days. 30 Tablet 5    LORazepam (ATIVAN) 0.5 MG Tab Take 1 Tablet by mouth every four hours as needed for Anxiety for up to 30 days. 30 Tablet 5    atorvastatin (LIPITOR) 20 MG Tab TAKE 1 TABLET BY MOUTH EVERY  DAY 90 Tablet 2    zolpidem (AMBIEN) 10 MG Tab Take 1 Tablet by mouth every evening.      sumatriptan (IMITREX) 100 MG tablet TAKE 1 TABLET BY MOUTH 1 TIME AS NEEDED FOR MIGRAINE. MAY TAKE SECOND TABLET 2 HOURS AFTER IF STILL AT ONSET OF HEADACHE      LORazepam (ATIVAN) 0.5 MG Tab       finasteride (PROSCAR) 5 MG Tab TAKE 1 TABLET BY MOUTH ONCE DAILY 90 Tablet 3    indomethacin (INDOCIN) 25 MG Cap TAKE 1 CAPSULE BY MOUTH THREE TIMES DAILY FOR 3 DAYS THEN 2 CAPSULES THREE TIMES DAILY FOR 3 DAYS THEN 3 CAPSULES THREE TIMES DAILY FOR 3 DAYS 54 Capsule 0    Hypromell-Glycerin-Naphazoline (CLEAR EYES FOR DRY EYES PLUS) 0.8-0.25-0.012 % Solution Administer 1 Drop into affected eye(s) every four hours as needed (Bilateral). 30 mL 11    DYMISTA 137-50 MCG/ACT Suspension Administer 2 Sprays into affected nostril(S) every day. 23 g 5    meclizine (ANTIVERT) 25 MG Tab TAKE 1 TABLET BY MOUTH THREE TIMES DAILY AS NEEDED FOR VERTIGO 90 Tablet 1    finasteride (PROPECIA) 1 MG tablet Take 1 Tablet by mouth every day. 90 Tablet 2    omeprazole (PRILOSEC) 20 MG delayed-release capsule Take 1 Capsule by mouth 2 times a day. 1/2 hour prior to same meal. 180 Capsule 2    sildenafil citrate (VIAGRA) 100 MG tablet TAKE 1 TAB PO ONCE DAILY 30 MIN PRIOR TO ACTIVITY 9 Tablet 11    albuterol 108 (90 Base) MCG/ACT Aero Soln inhalation aerosol Inhale 2 Puffs every four hours as needed. 18 g 11    naproxen (NAPROSYN) 500 MG Tab TAKE 1 TABLET BY MOUTH TWICE DAILY WITH MEALS 60 Tablet 0    cyclobenzaprine (FLEXERIL) 10 mg Tab Take 1 Tablet by mouth 3 times a day as needed. 60 Tablet 5     No current facility-administered medications for this visit.     He  has a past medical history of Back pain, Central sleep apnea, Depression, and FLIP (obstructive sleep apnea).    He has no past medical history of ASTHMA, Diabetes, Seizure (HCC), or Ulcer.    Social History and Family History were reviewed and updated.    ROS   No chest pain, no shortness of  "breath, no abdominal pain and all other systems were reviewed and are negative.       Objective:     Ht 1.854 m (6' 1\")   Wt 95.3 kg (210 lb)  Body mass index is 27.71 kg/m².   Physical Exam:  Constitutional: Alert, no distress.  Skin: Warm, dry, good turgor, no rashes in visible areas.  Eye: Equal, round and reactive, conjunctiva clear, lids normal.  ENMT: Lips without lesions, good dentition, oropharynx clear.  Neck: Trachea midline, no masses.   Lymph: No cervical or supraclavicular lymphadenopathy  Respiratory: Unlabored respiratory effort, lungs appear clear.  Psych: Alert and oriented x3, normal affect and mood.        Assessment and Plan:   The following treatment plan was discussed    1. Acute cough  Acute, new onset condition.  Symptoms secondary to COVID.  Do not suspect pneumonia.  Continue over-the-counter cold medications.  Provided Tussionex which is a controlled medication.   was reviewed.  Take medication as directed.  1 teaspoon every 12 hours as needed.  Should improve on his coughing.  If no improvement contact me through [facility] for prescription for benzonatate 200 mg.  Expect cough to gradually improve.  - Hydrocod Polst-CPM Polst ER (TUSSIONEX) 10-8 MG/5ML Suspension Extended Release; Take 5 mL by mouth every 12 hours for 14 days.  Dispense: 140 mL; Refill: 0    2. COVID-19 virus infection  Acute, new onset condition.  Nothing concerning today with his COVID symptoms.  Seen last week and discussed.  Stable.  - Hydrocod Polst-CPM Polst ER (TUSSIONEX) 10-8 MG/5ML Suspension Extended Release; Take 5 mL by mouth every 12 hours for 14 days.  Dispense: 140 mL; Refill: 0    3. Chronic left shoulder pain  Chronic condition with recent exacerbation while sneezing.  Do not suspect anything significant such as pleurisy or anything else that he mention.  Likely exacerbation of chronic neck pain from sneezing.  We will continue to monitor.         Followup: Return if symptoms worsen or fail to " improve.    Please note that this dictation was created using voice recognition software. I have made every reasonable attempt to correct obvious errors, but I expect that there are errors of grammar and possibly content that I did not discover before finalizing the note.

## 2023-03-17 DIAGNOSIS — N52.2 DRUG-INDUCED ERECTILE DYSFUNCTION: ICD-10-CM

## 2023-03-17 RX ORDER — SILDENAFIL 100 MG/1
TABLET, FILM COATED ORAL
Qty: 9 TABLET | Refills: 11 | Status: SHIPPED | OUTPATIENT
Start: 2023-03-17

## 2023-05-07 DIAGNOSIS — F33.1 MAJOR DEPRESSIVE DISORDER, RECURRENT EPISODE, MODERATE (HCC): ICD-10-CM

## 2023-05-07 DIAGNOSIS — R42 VERTIGO: ICD-10-CM

## 2023-05-08 RX ORDER — QUETIAPINE FUMARATE 25 MG/1
25 TABLET, FILM COATED ORAL DAILY
Qty: 90 TABLET | Refills: 2 | Status: SHIPPED | OUTPATIENT
Start: 2023-05-08

## 2023-05-08 RX ORDER — MECLIZINE HYDROCHLORIDE 25 MG/1
TABLET ORAL
Qty: 90 TABLET | Refills: 1 | Status: SHIPPED | OUTPATIENT
Start: 2023-05-08

## 2023-05-11 DIAGNOSIS — K21.9 GASTROESOPHAGEAL REFLUX DISEASE: ICD-10-CM

## 2023-05-11 PROCEDURE — 1125F AMNT PAIN NOTED PAIN PRSNT: CPT | Performed by: PHYSICIAN ASSISTANT

## 2023-05-12 RX ORDER — OMEPRAZOLE 20 MG/1
CAPSULE, DELAYED RELEASE ORAL
Qty: 180 CAPSULE | Refills: 2 | Status: SHIPPED | OUTPATIENT
Start: 2023-05-12

## 2023-06-03 DIAGNOSIS — E78.2 MIXED HYPERLIPIDEMIA: ICD-10-CM

## 2023-06-05 RX ORDER — ATORVASTATIN CALCIUM 20 MG/1
20 TABLET, FILM COATED ORAL
Qty: 90 TABLET | Refills: 2 | Status: SHIPPED | OUTPATIENT
Start: 2023-06-05 | End: 2023-08-30 | Stop reason: SDUPTHER

## 2023-07-12 ENCOUNTER — TELEMEDICINE (OUTPATIENT)
Dept: MEDICAL GROUP | Facility: MEDICAL CENTER | Age: 48
End: 2023-07-12
Payer: OTHER GOVERNMENT

## 2023-07-12 VITALS — HEIGHT: 73 IN | WEIGHT: 201 LBS | BODY MASS INDEX: 26.64 KG/M2

## 2023-07-12 DIAGNOSIS — Z12.5 SCREENING PSA (PROSTATE SPECIFIC ANTIGEN): ICD-10-CM

## 2023-07-12 DIAGNOSIS — F33.42 RECURRENT MAJOR DEPRESSIVE DISORDER, IN FULL REMISSION (HCC): ICD-10-CM

## 2023-07-12 DIAGNOSIS — Z11.59 ENCOUNTER FOR HEPATITIS C SCREENING TEST FOR LOW RISK PATIENT: ICD-10-CM

## 2023-07-12 DIAGNOSIS — Z00.00 PREVENTATIVE HEALTH CARE: ICD-10-CM

## 2023-07-12 DIAGNOSIS — R71.8 MICROCYTOSIS: ICD-10-CM

## 2023-07-12 DIAGNOSIS — R73.03 PREDIABETES: ICD-10-CM

## 2023-07-12 DIAGNOSIS — E78.2 MIXED HYPERLIPIDEMIA: ICD-10-CM

## 2023-07-12 PROBLEM — R53.83 FATIGUE: Status: RESOLVED | Noted: 2022-02-28 | Resolved: 2023-07-12

## 2023-07-12 PROBLEM — R05.9 COUGH: Status: RESOLVED | Noted: 2021-08-30 | Resolved: 2023-07-12

## 2023-07-12 PROBLEM — U07.1 COVID-19 VIRUS INFECTION: Status: RESOLVED | Noted: 2023-01-06 | Resolved: 2023-07-12

## 2023-07-12 PROCEDURE — 99214 OFFICE O/P EST MOD 30 MIN: CPT | Mod: 95 | Performed by: PHYSICIAN ASSISTANT

## 2023-07-12 ASSESSMENT — PATIENT HEALTH QUESTIONNAIRE - PHQ9
3. TROUBLE FALLING OR STAYING ASLEEP OR SLEEPING TOO MUCH: NEARLY EVERY DAY
8. MOVING OR SPEAKING SO SLOWLY THAT OTHER PEOPLE COULD HAVE NOTICED. OR THE OPPOSITE, BEING SO FIGETY OR RESTLESS THAT YOU HAVE BEEN MOVING AROUND A LOT MORE THAN USUAL: SEVERAL DAYS
1. LITTLE INTEREST OR PLEASURE IN DOING THINGS: SEVERAL DAYS
SUM OF ALL RESPONSES TO PHQ QUESTIONS 1-9: 15
7. TROUBLE CONCENTRATING ON THINGS, SUCH AS READING THE NEWSPAPER OR WATCHING TELEVISION: SEVERAL DAYS
SUM OF ALL RESPONSES TO PHQ9 QUESTIONS 1 AND 2: 2
6. FEELING BAD ABOUT YOURSELF - OR THAT YOU ARE A FAILURE OR HAVE LET YOURSELF OR YOUR FAMILY DOWN: MORE THAN HALF THE DAYS
9. THOUGHTS THAT YOU WOULD BE BETTER OFF DEAD, OR OF HURTING YOURSELF: NOT AT ALL
4. FEELING TIRED OR HAVING LITTLE ENERGY: NEARLY EVERY DAY
2. FEELING DOWN, DEPRESSED, IRRITABLE, OR HOPELESS: SEVERAL DAYS
5. POOR APPETITE OR OVEREATING: NEARLY EVERY DAY

## 2023-07-12 ASSESSMENT — FIBROSIS 4 INDEX: FIB4 SCORE: 0.52

## 2023-07-12 NOTE — ASSESSMENT & PLAN NOTE
This is a pleasant 48-year-old male in a virtual visit today.  He is concerned about his depressed state.  His anxious state.  He contacted his psychiatrist yesterday and has an appointment to see him tomorrow.  He is taking all of his anti-depression and antianxiety medications as directed.  Today scored a 15 on the PHQ-9.  Denies any SI or HI.  He is also overdue for labs.  He needs labs to follow-up on his history of microcytosis.  Also needs general labs.  Does have a history of prediabetes so A1c is needed.

## 2023-07-12 NOTE — PROGRESS NOTES
Subjective:   Mike Rios is a 48 y.o. male here today for depression and preventative health care.    This evaluation was conducted via Zoom using secure and encrypted videoconferencing technology. The patient was in their home in the [facility].    The patient's identity was confirmed and verbal consent was obtained for this virtual visit.      Recurrent major depressive disorder, in full remission (HCC)  This is a pleasant 48-year-old male in a virtual visit today.  He is concerned about his depressed state.  His anxious state.  He contacted his psychiatrist yesterday and has an appointment to see him tomorrow.  He is taking all of his anti-depression and antianxiety medications as directed.  Today scored a 15 on the PHQ-9.  Denies any SI or HI.  He is also overdue for labs.  He needs labs to follow-up on his history of microcytosis.  Also needs general labs.  Does have a history of prediabetes so A1c is needed.         Current medicines (including changes today)  Current Outpatient Medications   Medication Sig Dispense Refill    atorvastatin (LIPITOR) 20 MG Tab TAKE 1 TABLET BY MOUTH EVERY DAY 90 Tablet 2    omeprazole (PRILOSEC) 20 MG delayed-release capsule TAKE 1 CAPSULE BY MOUTH TWICE DAILY. 30 MINS PRIOR TO SAME MEAL 180 Capsule 2    QUEtiapine (SEROQUEL) 25 MG Tab TAKE 1 TABLET BY MOUTH EVERY DAY 90 Tablet 2    meclizine (ANTIVERT) 25 MG Tab TAKE 1 TABLET BY MOUTH THREE TIMES DAILY AS NEEDED FOR VERTIGO 90 Tablet 1    sildenafil citrate (VIAGRA) 100 MG tablet TAKE 1 TABLET BY MOUTH EVERY DAY 30 MINUTES BEFORE ACTIVITY 9 Tablet 11    buPROPion (WELLBUTRIN XL) 300 MG XL tablet Take 1 Tablet by mouth every morning. 90 Tablet 2    prazosin (MINIPRESS) 2 MG Cap Take 2 Capsules by mouth every evening. 180 Capsule 2    sertraline (ZOLOFT) 100 MG Tab Take 2 Tablets by mouth every day. 180 Tablet 2    propranolol (INDERAL) 20 MG Tab TAKE 1 TABLET BY MOUTH TWICE DAILY AS NEEDED FOR ANXIETY Strength: 20  "mg 180 Tablet 2    zolpidem (AMBIEN) 10 MG Tab Take 1 Tablet by mouth every evening.      sumatriptan (IMITREX) 100 MG tablet TAKE 1 TABLET BY MOUTH 1 TIME AS NEEDED FOR MIGRAINE. MAY TAKE SECOND TABLET 2 HOURS AFTER IF STILL AT ONSET OF HEADACHE      LORazepam (ATIVAN) 0.5 MG Tab       finasteride (PROSCAR) 5 MG Tab TAKE 1 TABLET BY MOUTH ONCE DAILY 90 Tablet 3    indomethacin (INDOCIN) 25 MG Cap TAKE 1 CAPSULE BY MOUTH THREE TIMES DAILY FOR 3 DAYS THEN 2 CAPSULES THREE TIMES DAILY FOR 3 DAYS THEN 3 CAPSULES THREE TIMES DAILY FOR 3 DAYS 54 Capsule 0    Hypromell-Glycerin-Naphazoline (CLEAR EYES FOR DRY EYES PLUS) 0.8-0.25-0.012 % Solution Administer 1 Drop into affected eye(s) every four hours as needed (Bilateral). 30 mL 11    DYMISTA 137-50 MCG/ACT Suspension Administer 2 Sprays into affected nostril(S) every day. 23 g 5    finasteride (PROPECIA) 1 MG tablet Take 1 Tablet by mouth every day. 90 Tablet 2    albuterol 108 (90 Base) MCG/ACT Aero Soln inhalation aerosol Inhale 2 Puffs every four hours as needed. 18 g 11    naproxen (NAPROSYN) 500 MG Tab TAKE 1 TABLET BY MOUTH TWICE DAILY WITH MEALS 60 Tablet 0    cyclobenzaprine (FLEXERIL) 10 mg Tab Take 1 Tablet by mouth 3 times a day as needed. 60 Tablet 5     No current facility-administered medications for this visit.     He  has a past medical history of Back pain, Central sleep apnea, Depression, and FLIP (obstructive sleep apnea).    He has no past medical history of ASTHMA, Diabetes, Seizure (HCC), or Ulcer.    Social History and Family History were reviewed and updated.    ROS   No chest pain, no shortness of breath, no abdominal pain and all other systems were reviewed and are negative.       Objective:     Ht 1.854 m (6' 1\")   Wt 91.2 kg (201 lb)  Body mass index is 26.52 kg/m².   Physical Exam:  Constitutional: Alert, no distress.  Skin: Warm, dry, good turgor, no rashes in visible areas.  Eye: Equal, round and reactive, conjunctiva clear, lids " normal.  ENMT: Lips without lesions, good dentition, oropharynx clear.  Neck: Trachea midline, no masses.   Respiratory: Unlabored respiratory effort.  Psych: Alert and oriented x3, normal affect and mood.        Assessment and Plan:   The following treatment plan was discussed    1. Recurrent major depressive disorder, in full remission (HCC)  Chronic condition.  Associated anxiety as well.  Uncontrolled.  Reviewed medications.  Follow-up with psychiatry tomorrow.    2. Microcytosis  Chronic condition.  Check iron indices.  Unknown cause.  - CBC WITH DIFFERENTIAL; Future  - IRON/TOTAL IRON BIND; Future  - FERRITIN; Future    3. Prediabetes  Chronic condition.  Status unknown.  Order labs.  Fast 8 hours.  - Comp Metabolic Panel; Future  - HEMOGLOBIN A1C; Future    4. Mixed hyperlipidemia  Chronic condition.  Status unknown.  Continue Lipitor.  Ordered labs.  Fast 8 hours.  He will be contacted with the results.    - Comp Metabolic Panel; Future  - Lipid Profile; Future    5. Preventative health care  Ordered labs.  Fast 8 hours.  - CBC WITH DIFFERENTIAL; Future  - Comp Metabolic Panel; Future  - HEMOGLOBIN A1C; Future  - Lipid Profile; Future  - TSH WITH REFLEX TO FT4; Future    6. Screening PSA (prostate specific antigen)  PSA ordered.  Screening.    - PROSTATE SPECIFIC AG SCREENING; Future    7. Encounter for hepatitis C screening test for low risk patient  Hep C viral antibody ordered.  Low risk.  - HEP C VIRUS ANTIBODY; Future         Followup: Return in about 3 months (around 10/12/2023), or if symptoms worsen or fail to improve.    Please note that this dictation was created using voice recognition software. I have made every reasonable attempt to correct obvious errors, but I expect that there are errors of grammar and possibly content that I did not discover before finalizing the note.

## 2023-07-13 ENCOUNTER — TELEMEDICINE (OUTPATIENT)
Dept: BEHAVIORAL HEALTH | Facility: CLINIC | Age: 48
End: 2023-07-13
Payer: OTHER GOVERNMENT

## 2023-07-13 DIAGNOSIS — F33.2 SEVERE EPISODE OF RECURRENT MAJOR DEPRESSIVE DISORDER, WITHOUT PSYCHOTIC FEATURES (HCC): ICD-10-CM

## 2023-07-13 DIAGNOSIS — F43.12 CHRONIC POST-TRAUMATIC STRESS DISORDER (PTSD): ICD-10-CM

## 2023-07-13 DIAGNOSIS — G47.09 OTHER INSOMNIA: ICD-10-CM

## 2023-07-13 DIAGNOSIS — F33.42 RECURRENT MAJOR DEPRESSIVE DISORDER, IN FULL REMISSION (HCC): ICD-10-CM

## 2023-07-13 DIAGNOSIS — F41.0 PANIC ATTACKS: ICD-10-CM

## 2023-07-13 DIAGNOSIS — F41.1 GAD (GENERALIZED ANXIETY DISORDER): ICD-10-CM

## 2023-07-13 PROCEDURE — 99214 OFFICE O/P EST MOD 30 MIN: CPT | Mod: 95 | Performed by: PSYCHIATRY & NEUROLOGY

## 2023-07-13 RX ORDER — ZOLPIDEM TARTRATE 10 MG/1
10 TABLET ORAL NIGHTLY PRN
Qty: 30 TABLET | Refills: 5 | Status: SHIPPED | OUTPATIENT
Start: 2023-07-13 | End: 2023-08-12

## 2023-07-13 RX ORDER — SERTRALINE HYDROCHLORIDE 100 MG/1
200 TABLET, FILM COATED ORAL DAILY
Qty: 180 TABLET | Refills: 2 | Status: SHIPPED | OUTPATIENT
Start: 2023-07-13 | End: 2024-02-28 | Stop reason: SDUPTHER

## 2023-07-13 RX ORDER — PRAZOSIN HYDROCHLORIDE 2 MG/1
4 CAPSULE ORAL NIGHTLY
Qty: 180 CAPSULE | Refills: 2 | Status: SHIPPED | OUTPATIENT
Start: 2023-07-13 | End: 2024-02-28 | Stop reason: SDUPTHER

## 2023-07-13 RX ORDER — PROPRANOLOL HYDROCHLORIDE 20 MG/1
TABLET ORAL
Qty: 180 TABLET | Refills: 2 | Status: SHIPPED | OUTPATIENT
Start: 2023-07-13 | End: 2024-02-28 | Stop reason: SDUPTHER

## 2023-07-13 RX ORDER — BUPROPION HYDROCHLORIDE 300 MG/1
300 TABLET ORAL EVERY MORNING
Qty: 90 TABLET | Refills: 2 | Status: SHIPPED | OUTPATIENT
Start: 2023-07-13 | End: 2024-02-28 | Stop reason: SDUPTHER

## 2023-07-13 RX ORDER — LORAZEPAM 0.5 MG/1
0.5 TABLET ORAL
Qty: 30 TABLET | Refills: 2 | Status: SHIPPED | OUTPATIENT
Start: 2023-07-13 | End: 2023-08-12
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
## 2023-08-28 ENCOUNTER — HOSPITAL ENCOUNTER (OUTPATIENT)
Dept: LAB | Facility: MEDICAL CENTER | Age: 48
End: 2023-08-28
Attending: PHYSICIAN ASSISTANT
Payer: OTHER GOVERNMENT

## 2023-08-28 DIAGNOSIS — R73.03 PREDIABETES: ICD-10-CM

## 2023-08-28 DIAGNOSIS — R71.8 MICROCYTOSIS: ICD-10-CM

## 2023-08-28 DIAGNOSIS — Z12.5 SCREENING PSA (PROSTATE SPECIFIC ANTIGEN): ICD-10-CM

## 2023-08-28 DIAGNOSIS — Z00.00 PREVENTATIVE HEALTH CARE: ICD-10-CM

## 2023-08-28 DIAGNOSIS — E78.2 MIXED HYPERLIPIDEMIA: ICD-10-CM

## 2023-08-28 DIAGNOSIS — Z11.59 ENCOUNTER FOR HEPATITIS C SCREENING TEST FOR LOW RISK PATIENT: ICD-10-CM

## 2023-08-28 LAB
ALBUMIN SERPL BCP-MCNC: 4.4 G/DL (ref 3.2–4.9)
ALBUMIN/GLOB SERPL: 1.5 G/DL
ALP SERPL-CCNC: 82 U/L (ref 30–99)
ALT SERPL-CCNC: 57 U/L (ref 2–50)
ANION GAP SERPL CALC-SCNC: 11 MMOL/L (ref 7–16)
AST SERPL-CCNC: 36 U/L (ref 12–45)
BASOPHILS # BLD AUTO: 0.9 % (ref 0–1.8)
BASOPHILS # BLD: 0.06 K/UL (ref 0–0.12)
BILIRUB SERPL-MCNC: 0.4 MG/DL (ref 0.1–1.5)
BUN SERPL-MCNC: 18 MG/DL (ref 8–22)
CALCIUM ALBUM COR SERPL-MCNC: 9 MG/DL (ref 8.5–10.5)
CALCIUM SERPL-MCNC: 9.3 MG/DL (ref 8.5–10.5)
CHLORIDE SERPL-SCNC: 102 MMOL/L (ref 96–112)
CHOLEST SERPL-MCNC: 192 MG/DL (ref 100–199)
CO2 SERPL-SCNC: 26 MMOL/L (ref 20–33)
CREAT SERPL-MCNC: 1.35 MG/DL (ref 0.5–1.4)
EOSINOPHIL # BLD AUTO: 0.17 K/UL (ref 0–0.51)
EOSINOPHIL NFR BLD: 2.5 % (ref 0–6.9)
ERYTHROCYTE [DISTWIDTH] IN BLOOD BY AUTOMATED COUNT: 36.7 FL (ref 35.9–50)
EST. AVERAGE GLUCOSE BLD GHB EST-MCNC: 126 MG/DL
FERRITIN SERPL-MCNC: 1108 NG/ML (ref 22–322)
GFR SERPLBLD CREATININE-BSD FMLA CKD-EPI: 64 ML/MIN/1.73 M 2
GLOBULIN SER CALC-MCNC: 3 G/DL (ref 1.9–3.5)
GLUCOSE SERPL-MCNC: 94 MG/DL (ref 65–99)
HBA1C MFR BLD: 6 % (ref 4–5.6)
HCT VFR BLD AUTO: 47.6 % (ref 42–52)
HCV AB SER QL: NORMAL
HDLC SERPL-MCNC: 40 MG/DL
HGB BLD-MCNC: 15.5 G/DL (ref 14–18)
IMM GRANULOCYTES # BLD AUTO: 0.02 K/UL (ref 0–0.11)
IMM GRANULOCYTES NFR BLD AUTO: 0.3 % (ref 0–0.9)
IRON SATN MFR SERPL: 41 % (ref 15–55)
IRON SERPL-MCNC: 115 UG/DL (ref 50–180)
LDLC SERPL CALC-MCNC: 119 MG/DL
LYMPHOCYTES # BLD AUTO: 2.24 K/UL (ref 1–4.8)
LYMPHOCYTES NFR BLD: 32.5 % (ref 22–41)
MCH RBC QN AUTO: 24.2 PG (ref 27–33)
MCHC RBC AUTO-ENTMCNC: 32.6 G/DL (ref 32.3–36.5)
MCV RBC AUTO: 74.3 FL (ref 81.4–97.8)
MONOCYTES # BLD AUTO: 0.95 K/UL (ref 0–0.85)
MONOCYTES NFR BLD AUTO: 13.8 % (ref 0–13.4)
NEUTROPHILS # BLD AUTO: 3.45 K/UL (ref 1.82–7.42)
NEUTROPHILS NFR BLD: 50 % (ref 44–72)
NRBC # BLD AUTO: 0 K/UL
NRBC BLD-RTO: 0 /100 WBC (ref 0–0.2)
PLATELET # BLD AUTO: 378 K/UL (ref 164–446)
PMV BLD AUTO: 9.5 FL (ref 9–12.9)
POTASSIUM SERPL-SCNC: 4.4 MMOL/L (ref 3.6–5.5)
PROT SERPL-MCNC: 7.4 G/DL (ref 6–8.2)
PSA SERPL-MCNC: 0.42 NG/ML (ref 0–4)
RBC # BLD AUTO: 6.41 M/UL (ref 4.7–6.1)
SODIUM SERPL-SCNC: 139 MMOL/L (ref 135–145)
TIBC SERPL-MCNC: 280 UG/DL (ref 250–450)
TRIGL SERPL-MCNC: 167 MG/DL (ref 0–149)
TSH SERPL DL<=0.005 MIU/L-ACNC: 1.36 UIU/ML (ref 0.38–5.33)
UIBC SERPL-MCNC: 165 UG/DL (ref 110–370)
WBC # BLD AUTO: 6.9 K/UL (ref 4.8–10.8)

## 2023-08-28 PROCEDURE — 84443 ASSAY THYROID STIM HORMONE: CPT

## 2023-08-28 PROCEDURE — 85025 COMPLETE CBC W/AUTO DIFF WBC: CPT

## 2023-08-28 PROCEDURE — 84153 ASSAY OF PSA TOTAL: CPT

## 2023-08-28 PROCEDURE — 82728 ASSAY OF FERRITIN: CPT

## 2023-08-28 PROCEDURE — 83036 HEMOGLOBIN GLYCOSYLATED A1C: CPT

## 2023-08-28 PROCEDURE — 83540 ASSAY OF IRON: CPT

## 2023-08-28 PROCEDURE — 80053 COMPREHEN METABOLIC PANEL: CPT

## 2023-08-28 PROCEDURE — 86803 HEPATITIS C AB TEST: CPT

## 2023-08-28 PROCEDURE — 83550 IRON BINDING TEST: CPT

## 2023-08-28 PROCEDURE — 36415 COLL VENOUS BLD VENIPUNCTURE: CPT

## 2023-08-28 PROCEDURE — 80061 LIPID PANEL: CPT

## 2023-08-30 ENCOUNTER — HOSPITAL ENCOUNTER (OUTPATIENT)
Dept: LAB | Facility: MEDICAL CENTER | Age: 48
End: 2023-08-30
Attending: PHYSICIAN ASSISTANT
Payer: OTHER GOVERNMENT

## 2023-08-30 ENCOUNTER — TELEMEDICINE (OUTPATIENT)
Dept: MEDICAL GROUP | Facility: MEDICAL CENTER | Age: 48
End: 2023-08-30
Payer: OTHER GOVERNMENT

## 2023-08-30 VITALS — WEIGHT: 215 LBS | HEIGHT: 73 IN | BODY MASS INDEX: 28.49 KG/M2

## 2023-08-30 DIAGNOSIS — E78.2 MIXED HYPERLIPIDEMIA: ICD-10-CM

## 2023-08-30 DIAGNOSIS — R79.89 ELEVATED FERRITIN: ICD-10-CM

## 2023-08-30 PROCEDURE — 36415 COLL VENOUS BLD VENIPUNCTURE: CPT

## 2023-08-30 PROCEDURE — 99214 OFFICE O/P EST MOD 30 MIN: CPT | Mod: 95 | Performed by: PHYSICIAN ASSISTANT

## 2023-08-30 PROCEDURE — 81256 HFE GENE: CPT

## 2023-08-30 RX ORDER — ATORVASTATIN CALCIUM 40 MG/1
40 TABLET, FILM COATED ORAL
Qty: 90 TABLET | Refills: 3 | Status: SHIPPED | OUTPATIENT
Start: 2023-08-30 | End: 2024-08-24

## 2023-08-30 ASSESSMENT — FIBROSIS 4 INDEX: FIB4 SCORE: 0.62

## 2023-08-30 NOTE — ASSESSMENT & PLAN NOTE
This is a pleasant 48-year-old male here today on a virtual visit to follow-up on recent labs.  History of microcytic anemia.  Iron indices indicated that iron levels were normal range but his ferritin level was above 1100.  His liver enzymes were good as well as his kidney function.  Also his cholesterol levels were still elevated.  LDL at 119.  Prior to starting Lipitor 20 mg LDL was in the 160s.  Other lab markers were good.

## 2023-08-30 NOTE — PROGRESS NOTES
Subjective:   Mike Rios is a 48 y.o. male here today for elevated ferritin and hypercholesterolemia.    This evaluation was conducted via Zoom using secure and encrypted videoconferencing technology. The patient was in their home in the [facility].    The patient's identity was confirmed and verbal consent was obtained for this virtual visit.        Elevated ferritin  This is a pleasant 48-year-old male here today on a virtual visit to follow-up on recent labs.  History of microcytic anemia.  Iron indices indicated that iron levels were normal range but his ferritin level was above 1100.  His liver enzymes were good as well as his kidney function.  Also his cholesterol levels were still elevated.  LDL at 119.  Prior to starting Lipitor 20 mg LDL was in the 160s.  Other lab markers were good.       Current medicines (including changes today)  Current Outpatient Medications   Medication Sig Dispense Refill    atorvastatin (LIPITOR) 40 MG Tab Take 1 Tablet by mouth every day for 360 days. 90 Tablet 3    buPROPion (WELLBUTRIN XL) 300 MG XL tablet Take 1 Tablet by mouth every morning. 90 Tablet 2    prazosin (MINIPRESS) 2 MG Cap Take 2 Capsules by mouth every evening. 180 Capsule 2    propranolol (INDERAL) 20 MG Tab TAKE 1 TABLET BY MOUTH TWICE DAILY AS NEEDED FOR ANXIETY Strength: 20 mg 180 Tablet 2    sertraline (ZOLOFT) 100 MG Tab Take 2 Tablets by mouth every day. 180 Tablet 2    omeprazole (PRILOSEC) 20 MG delayed-release capsule TAKE 1 CAPSULE BY MOUTH TWICE DAILY. 30 MINS PRIOR TO SAME MEAL 180 Capsule 2    QUEtiapine (SEROQUEL) 25 MG Tab TAKE 1 TABLET BY MOUTH EVERY DAY 90 Tablet 2    meclizine (ANTIVERT) 25 MG Tab TAKE 1 TABLET BY MOUTH THREE TIMES DAILY AS NEEDED FOR VERTIGO 90 Tablet 1    sildenafil citrate (VIAGRA) 100 MG tablet TAKE 1 TABLET BY MOUTH EVERY DAY 30 MINUTES BEFORE ACTIVITY 9 Tablet 11    sumatriptan (IMITREX) 100 MG tablet TAKE 1 TABLET BY MOUTH 1 TIME AS NEEDED FOR MIGRAINE. MAY  "TAKE SECOND TABLET 2 HOURS AFTER IF STILL AT ONSET OF HEADACHE      finasteride (PROSCAR) 5 MG Tab TAKE 1 TABLET BY MOUTH ONCE DAILY 90 Tablet 3    indomethacin (INDOCIN) 25 MG Cap TAKE 1 CAPSULE BY MOUTH THREE TIMES DAILY FOR 3 DAYS THEN 2 CAPSULES THREE TIMES DAILY FOR 3 DAYS THEN 3 CAPSULES THREE TIMES DAILY FOR 3 DAYS 54 Capsule 0    Hypromell-Glycerin-Naphazoline (CLEAR EYES FOR DRY EYES PLUS) 0.8-0.25-0.012 % Solution Administer 1 Drop into affected eye(s) every four hours as needed (Bilateral). 30 mL 11    DYMISTA 137-50 MCG/ACT Suspension Administer 2 Sprays into affected nostril(S) every day. 23 g 5    finasteride (PROPECIA) 1 MG tablet Take 1 Tablet by mouth every day. 90 Tablet 2    albuterol 108 (90 Base) MCG/ACT Aero Soln inhalation aerosol Inhale 2 Puffs every four hours as needed. 18 g 11    naproxen (NAPROSYN) 500 MG Tab TAKE 1 TABLET BY MOUTH TWICE DAILY WITH MEALS 60 Tablet 0    cyclobenzaprine (FLEXERIL) 10 mg Tab Take 1 Tablet by mouth 3 times a day as needed. 60 Tablet 5     No current facility-administered medications for this visit.     He  has a past medical history of Back pain, Central sleep apnea, Depression, and LFIP (obstructive sleep apnea).    He has no past medical history of ASTHMA, Diabetes, Seizure (HCC), or Ulcer.    Social History and Family History were reviewed and updated.    ROS   No chest pain, no shortness of breath, no abdominal pain and all other systems were reviewed and are negative.       Objective:     Ht 1.854 m (6' 1\")   Wt 97.5 kg (215 lb)  Body mass index is 28.37 kg/m².   Physical Exam:  Constitutional: Alert, no distress.  Skin: Warm, dry, good turgor, no rashes in visible areas.  Eye: Equal, round and reactive, conjunctiva clear, lids normal.  ENMT: Lips without lesions, good dentition, oropharynx clear.  Neck: Trachea midline, no masses.   Respiratory: Unlabored respiratory effort.  Psych: Alert and oriented x3, normal affect and mood.        Assessment and " Plan:   The following treatment plan was discussed    1. Elevated ferritin  Condition noted in chart.  Discussed likely thalassemia but will order hemochromatosis hereditary profile.  Follow-up at labs today.  He does not need to fast.  Referred to hematology for consultation as well.  - Referral to Hematology Oncology  - HEREDITARY HEMOCHROMOTOSIS; Future    2. Mixed hyperlipidemia  Chronic condition.  Still Oliver levels elevated despite 20 mg of Lipitor.  Will increase to Lipitor to 40 mg daily.  Repeat cholesterol profile in 4 to 6 weeks.  Fast 8 hours.  - atorvastatin (LIPITOR) 40 MG Tab; Take 1 Tablet by mouth every day for 360 days.  Dispense: 90 Tablet; Refill: 3  - Lipid Profile; Future         Followup: Return if symptoms worsen or fail to improve.    Please note that this dictation was created using voice recognition software. I have made every reasonable attempt to correct obvious errors, but I expect that there are errors of grammar and possibly content that I did not discover before finalizing the note.

## 2023-09-06 LAB
HFE GENE MUT ANL BLD/T: NORMAL
HFE P.C282Y BLD/T QL: NEGATIVE
HFE P.H63D BLD/T QL: NORMAL
HFE P.S65C BLD/T QL: NEGATIVE

## 2023-09-29 ENCOUNTER — HOSPITAL ENCOUNTER (OUTPATIENT)
Dept: LAB | Facility: MEDICAL CENTER | Age: 48
End: 2023-09-29
Attending: PHYSICIAN ASSISTANT
Payer: OTHER GOVERNMENT

## 2023-09-29 DIAGNOSIS — E78.2 MIXED HYPERLIPIDEMIA: ICD-10-CM

## 2023-09-29 LAB
CHOLEST SERPL-MCNC: 160 MG/DL (ref 100–199)
FASTING STATUS PATIENT QL REPORTED: NORMAL
HDLC SERPL-MCNC: 35 MG/DL
LDLC SERPL CALC-MCNC: 98 MG/DL
TRIGL SERPL-MCNC: 137 MG/DL (ref 0–149)

## 2023-09-29 PROCEDURE — 36415 COLL VENOUS BLD VENIPUNCTURE: CPT

## 2023-09-29 PROCEDURE — 80061 LIPID PANEL: CPT

## 2023-10-02 DIAGNOSIS — R79.89 ELEVATED FERRITIN: ICD-10-CM

## 2023-10-10 ENCOUNTER — APPOINTMENT (OUTPATIENT)
Dept: MEDICAL GROUP | Facility: MEDICAL CENTER | Age: 48
End: 2023-10-10
Payer: OTHER GOVERNMENT

## 2023-10-11 ENCOUNTER — TELEMEDICINE (OUTPATIENT)
Dept: MEDICAL GROUP | Facility: MEDICAL CENTER | Age: 48
End: 2023-10-11
Payer: OTHER GOVERNMENT

## 2023-10-11 VITALS — BODY MASS INDEX: 27.83 KG/M2 | WEIGHT: 210 LBS | HEIGHT: 73 IN

## 2023-10-11 DIAGNOSIS — E78.2 MIXED HYPERLIPIDEMIA: ICD-10-CM

## 2023-10-11 DIAGNOSIS — R79.89 ELEVATED FERRITIN: ICD-10-CM

## 2023-10-11 DIAGNOSIS — R73.03 PREDIABETES: ICD-10-CM

## 2023-10-11 PROCEDURE — 99214 OFFICE O/P EST MOD 30 MIN: CPT | Mod: 95 | Performed by: PHYSICIAN ASSISTANT

## 2023-10-11 ASSESSMENT — FIBROSIS 4 INDEX: FIB4 SCORE: 0.62

## 2023-10-11 NOTE — ASSESSMENT & PLAN NOTE
This is a pleasant 48-year-old male here today in a virtual visit.  Has an appointment in December with Dr. Almaguer.  Prior level above 1100.  He plans to repeat the ferritin level when he gets a chance.  Iron level in normal range.

## 2023-10-11 NOTE — ASSESSMENT & PLAN NOTE
Last A1c at 6.0.  He is gone through a severe lifestyle change with his diet.  He knew much healthier.  Gave up fatty foods and processed foods.  Last cholesterol profile was much better.  LDL at 98.  He is only taking 20 mg of atorvastatin.  Did not go up to the 40 mg.

## 2023-10-11 NOTE — PROGRESS NOTES
Subjective:   Mike Rios is a 48 y.o. male here today for elevated ferritin and prediabetes.    This evaluation was conducted via Zoom using secure and encrypted videoconferencing technology. The patient was in their home in the [facility].    The patient's identity was confirmed and verbal consent was obtained for this virtual visit.      Elevated ferritin  This is a pleasant 48-year-old male here today in a virtual visit.  Has an appointment in December with Dr. Almaguer.  Prior level above 1100.  He plans to repeat the ferritin level when he gets a chance.  Iron level in normal range.    Prediabetes  Last A1c at 6.0.  He is gone through a severe lifestyle change with his diet.  He knew much healthier.  Gave up fatty foods and processed foods.  Last cholesterol profile was much better.  LDL at 98.  He is only taking 20 mg of atorvastatin.  Did not go up to the 40 mg.       Current medicines (including changes today)  Current Outpatient Medications   Medication Sig Dispense Refill    sumatriptan (IMITREX) 100 MG tablet TAKE 1 TABLET BY MOUTH 1 TIME AS NEEDED FOR MIGRAINE. MAY TAKE SECOND TABLET 2 HOURS AFTER IF STILL AT ONSET OF HEADACHE 9 Tablet 11    atorvastatin (LIPITOR) 40 MG Tab Take 1 Tablet by mouth every day for 360 days. 90 Tablet 3    buPROPion (WELLBUTRIN XL) 300 MG XL tablet Take 1 Tablet by mouth every morning. 90 Tablet 2    prazosin (MINIPRESS) 2 MG Cap Take 2 Capsules by mouth every evening. 180 Capsule 2    propranolol (INDERAL) 20 MG Tab TAKE 1 TABLET BY MOUTH TWICE DAILY AS NEEDED FOR ANXIETY Strength: 20 mg 180 Tablet 2    sertraline (ZOLOFT) 100 MG Tab Take 2 Tablets by mouth every day. 180 Tablet 2    omeprazole (PRILOSEC) 20 MG delayed-release capsule TAKE 1 CAPSULE BY MOUTH TWICE DAILY. 30 MINS PRIOR TO SAME MEAL 180 Capsule 2    QUEtiapine (SEROQUEL) 25 MG Tab TAKE 1 TABLET BY MOUTH EVERY DAY 90 Tablet 2    meclizine (ANTIVERT) 25 MG Tab TAKE 1 TABLET BY MOUTH THREE TIMES DAILY  "AS NEEDED FOR VERTIGO 90 Tablet 1    sildenafil citrate (VIAGRA) 100 MG tablet TAKE 1 TABLET BY MOUTH EVERY DAY 30 MINUTES BEFORE ACTIVITY 9 Tablet 11    finasteride (PROSCAR) 5 MG Tab TAKE 1 TABLET BY MOUTH ONCE DAILY 90 Tablet 3    indomethacin (INDOCIN) 25 MG Cap TAKE 1 CAPSULE BY MOUTH THREE TIMES DAILY FOR 3 DAYS THEN 2 CAPSULES THREE TIMES DAILY FOR 3 DAYS THEN 3 CAPSULES THREE TIMES DAILY FOR 3 DAYS 54 Capsule 0    Hypromell-Glycerin-Naphazoline (CLEAR EYES FOR DRY EYES PLUS) 0.8-0.25-0.012 % Solution Administer 1 Drop into affected eye(s) every four hours as needed (Bilateral). 30 mL 11    DYMISTA 137-50 MCG/ACT Suspension Administer 2 Sprays into affected nostril(S) every day. 23 g 5    finasteride (PROPECIA) 1 MG tablet Take 1 Tablet by mouth every day. 90 Tablet 2    albuterol 108 (90 Base) MCG/ACT Aero Soln inhalation aerosol Inhale 2 Puffs every four hours as needed. 18 g 11    naproxen (NAPROSYN) 500 MG Tab TAKE 1 TABLET BY MOUTH TWICE DAILY WITH MEALS 60 Tablet 0    cyclobenzaprine (FLEXERIL) 10 mg Tab Take 1 Tablet by mouth 3 times a day as needed. 60 Tablet 5     No current facility-administered medications for this visit.     He  has a past medical history of Back pain, Central sleep apnea, Depression, and FLIP (obstructive sleep apnea).    He has no past medical history of ASTHMA, Diabetes, Seizure (HCC), or Ulcer.    Social History and Family History were reviewed and updated.    ROS   No chest pain, no shortness of breath, no abdominal pain and all other systems were reviewed and are negative.       Objective:     Ht 1.854 m (6' 1\")   Wt 95.3 kg (210 lb)  Body mass index is 27.71 kg/m².   Physical Exam:  Constitutional: Alert, no distress.  Skin: Warm, dry, good turgor, no rashes in visible areas.  Eye: Equal, round and reactive, conjunctiva clear, lids normal.  ENMT: Lips without lesions, good dentition, oropharynx clear.  Psych: Alert and oriented x3, normal affect and mood.        Assessment " and Plan:   The following treatment plan was discussed    1. Elevated ferritin  Chronic condition.  Ferritin has been elevated at this time.  Follow-up with hematology in December.  Repeat ferritin level prior to appointment.    2. Prediabetes  Chronic condition.  Has done well with his dietary changes.  We will check A1c and follow-up.  Perform early December.  A1c previously at 6.0.  - HEMOGLOBIN A1C; Future    3. Mixed hyperlipidemia  Chronic condition.  Stable.  Improvement with dietary changes.  Discussed continuing with atorvastatin 20 mg.  Also discussed switching to Crestor.  His prediabetic range does not change and follow-up will change to Crestor.  - Lipid Profile; Future         Followup: Return if symptoms worsen or fail to improve.    Please note that this dictation was created using voice recognition software. I have made every reasonable attempt to correct obvious errors, but I expect that there are errors of grammar and possibly content that I did not discover before finalizing the note.

## 2023-11-30 DIAGNOSIS — L65.9 HAIR LOSS: ICD-10-CM

## 2023-11-30 DIAGNOSIS — R35.1 NOCTURIA: ICD-10-CM

## 2023-11-30 RX ORDER — FINASTERIDE 5 MG/1
TABLET, FILM COATED ORAL
Qty: 90 TABLET | Refills: 3 | Status: SHIPPED | OUTPATIENT
Start: 2023-11-30

## 2023-12-05 NOTE — PROGRESS NOTES
Date of service 3/13/24 - rescheduled from 23    Subjective    Chief Complaint:  Elevated ferritin    HPI:  48 male referred for consultation by Aamir Shepherd P.A.-C because of an elevated ferritin. Hemochromatosis genetic testing reveals that he is a  heterozygote for H63D. Interestingly he has microcytic RBC indices with a normal iron level and ferritin > 1000. Parents , one from heart disease.     ROS:    Constitutional: No weight loss  Skin: No rash or jaundice  HENT: No change in eyesight or hearing  Cardiovascular:No chest pain or arrythmia  Respiratory:No cough or SOB  GI:No nausea, vomiting, diarrhea, constipation  :No dysuria or frequency  Musculoskeletal:No bone or joint pain  Neuro:No sx's of neuropathy  Psych: No complaints    PMH:      Allergies   Allergen Reactions    Pcn [Penicillins] Anaphylaxis       Past Medical History:   Diagnosis Date    Back pain     Central sleep apnea     Depression     FLIP (obstructive sleep apnea)         Past Surgical History:   Procedure Laterality Date    HERNIA REPAIR          Medications:    Current Outpatient Medications on File Prior to Encounter   Medication Sig Dispense Refill    finasteride (PROPECIA) 1 MG tablet Take 1 Tablet by mouth every day. 90 Tablet 2    buPROPion (WELLBUTRIN XL) 300 MG XL tablet Take 1 Tablet by mouth every morning. 90 Tablet 2    prazosin (MINIPRESS) 2 MG Cap Take 2 Capsules by mouth every evening. 180 Capsule 2    propranolol (INDERAL) 20 MG Tab TAKE 1 TABLET BY MOUTH TWICE DAILY AS NEEDED FOR ANXIETY Strength: 20 mg 180 Tablet 2    sertraline (ZOLOFT) 100 MG Tab Take 2 Tablets by mouth every day. 180 Tablet 2    LORazepam (ATIVAN) 0.5 MG Tab Take 1 Tablet by mouth 1 time a day as needed for Anxiety for up to 30 days. (Avoid daily use) 30 Tablet 0    zolpidem (AMBIEN) 10 MG Tab Take 1 Tablet by mouth at bedtime as needed for Sleep for up to 30 days. 30 Tablet 5    finasteride (PROSCAR) 5 MG Tab TAKE 1 TABLET BY MOUTH  EVERY DAY 90 Tablet 3    sumatriptan (IMITREX) 100 MG tablet TAKE 1 TABLET BY MOUTH 1 TIME AS NEEDED FOR MIGRAINE. MAY TAKE SECOND TABLET 2 HOURS AFTER IF STILL AT ONSET OF HEADACHE 9 Tablet 11    atorvastatin (LIPITOR) 40 MG Tab Take 1 Tablet by mouth every day for 360 days. 90 Tablet 3    omeprazole (PRILOSEC) 20 MG delayed-release capsule TAKE 1 CAPSULE BY MOUTH TWICE DAILY. 30 MINS PRIOR TO SAME MEAL 180 Capsule 2    QUEtiapine (SEROQUEL) 25 MG Tab TAKE 1 TABLET BY MOUTH EVERY DAY 90 Tablet 2    meclizine (ANTIVERT) 25 MG Tab TAKE 1 TABLET BY MOUTH THREE TIMES DAILY AS NEEDED FOR VERTIGO 90 Tablet 1    sildenafil citrate (VIAGRA) 100 MG tablet TAKE 1 TABLET BY MOUTH EVERY DAY 30 MINUTES BEFORE ACTIVITY 9 Tablet 11    indomethacin (INDOCIN) 25 MG Cap TAKE 1 CAPSULE BY MOUTH THREE TIMES DAILY FOR 3 DAYS THEN 2 CAPSULES THREE TIMES DAILY FOR 3 DAYS THEN 3 CAPSULES THREE TIMES DAILY FOR 3 DAYS 54 Capsule 0    Hypromell-Glycerin-Naphazoline (CLEAR EYES FOR DRY EYES PLUS) 0.8-0.25-0.012 % Solution Administer 1 Drop into affected eye(s) every four hours as needed (Bilateral). 30 mL 11    DYMISTA 137-50 MCG/ACT Suspension Administer 2 Sprays into affected nostril(S) every day. 23 g 5    albuterol 108 (90 Base) MCG/ACT Aero Soln inhalation aerosol Inhale 2 Puffs every four hours as needed. 18 g 11    naproxen (NAPROSYN) 500 MG Tab TAKE 1 TABLET BY MOUTH TWICE DAILY WITH MEALS 60 Tablet 0    cyclobenzaprine (FLEXERIL) 10 mg Tab Take 1 Tablet by mouth 3 times a day as needed. 60 Tablet 5     No current facility-administered medications on file prior to encounter.       Social History     Tobacco Use    Smoking status: Never    Smokeless tobacco: Never   Substance Use Topics    Alcohol use: Yes     Alcohol/week: 0.0 oz     Comment: Rare        Family History   Problem Relation Age of Onset    Heart Attack Other     Hypertension Father     Heart Disease Father     Hyperlipidemia Father     Cancer Mother         Lung CA     "Psychiatric Illness Neg Hx         Objective    Vitals:    /78 (BP Location: Left arm, Patient Position: Sitting, BP Cuff Size: Adult)   Pulse 87   Temp 36.3 °C (97.4 °F) (Temporal)   Resp 13   Ht 1.854 m (6' 1\")   Wt 97 kg (213 lb 13.5 oz)   SpO2 97%   BMI 28.21 kg/m²     Physical Exam: - By Dr. Milvia Marsh    Appears well-developed and well-nourished. No distress.    Head -  Normocephalic .   Eyes - Pupils are equal. Conjunctivae normal. No scleral icterus.   Ears - normal hearing  Mouth - Throat: Oropharynx is clear and moist. No oropharyngeal exudate  Neck - Neck supple. No thyromegaly  Cardiovascular - Normal rate, regular rhythm, normal heart sounds and intact distal pulses. No  gallop, murmur or rub  Pulmonary - Normal breath sounds.  No wheeze, rales or rhonchi  Abdominal -Soft. No distension, tenderness, organomegaly or mass  Extremities-  No edema or tenderness.    Nodes - No submental, submandibular, preauricular, cervical, axillary or inguinal adenopathy.    Neurological -   Alert and oriented.  Skin - Skin is warm and dry. No rash noted. Not diaphoretic. No erythema. No pallor. No jaundice   Psychiatric -  Normal mood and affect.    Labs:     08/30/23 10:38   C282Y Mutation Negative   H63D Mutation Heterozygous   S65C Mutation Negative      Latest Reference Range & Units 08/28/23 11:12   Ferritin 22.0 - 322.0 ng/mL 1108.0 (H)      Latest Reference Range & Units 08/28/23 11:12   Iron 50 - 180 ug/dL 115   Total Iron Binding 250 - 450 ug/dL 280   % Saturation 15 - 55 % 41      Latest Reference Range & Units 08/28/23 11:12   WBC 4.8 - 10.8 K/uL 6.9   RBC 4.70 - 6.10 M/uL 6.41 (H)   Hemoglobin 14.0 - 18.0 g/dL 15.5   Hematocrit 42.0 - 52.0 % 47.6   MCV 81.4 - 97.8 fL 74.3 (L)   MCH 27.0 - 33.0 pg 24.2 (L)   MCHC 32.3 - 36.5 g/dL 32.6   RDW 35.9 - 50.0 fL 36.7   Platelet Count 164 - 446 K/uL 378      Latest Reference Range & Units 08/28/23 11:12   Ferritin 22.0 - 322.0 ng/mL 1108.0 (H)      " 08/30/23 10:38   C282Y Mutation Negative   H63D Mutation Heterozygous   S65C Mutation Negative      Latest Reference Range & Units 08/28/23 11:12   Iron 50 - 180 ug/dL 115   Total Iron Binding 250 - 450 ug/dL 280   % Saturation 15 - 55 % 41       Assessment  Hereditary hemochromatosis plus probable thalassemia minor  Imp:    Visit Diagnosis:    1. Elevated ferritin        2. Hereditary hemochromatosis (HCC)        3. Microcytosis  HEMOGLOBINOPATHY PROFILE    RETICULOCYTES COUNT    LDH        Plan:  Above lab  Therapeutic phlebotomy x 3 and then recheck    Valente Almaguer M.D.

## 2024-02-28 ENCOUNTER — TELEMEDICINE (OUTPATIENT)
Dept: BEHAVIORAL HEALTH | Facility: CLINIC | Age: 49
End: 2024-02-28
Payer: OTHER GOVERNMENT

## 2024-02-28 DIAGNOSIS — F33.2 SEVERE EPISODE OF RECURRENT MAJOR DEPRESSIVE DISORDER, WITHOUT PSYCHOTIC FEATURES (HCC): ICD-10-CM

## 2024-02-28 DIAGNOSIS — F33.42 RECURRENT MAJOR DEPRESSIVE DISORDER, IN FULL REMISSION (HCC): ICD-10-CM

## 2024-02-28 DIAGNOSIS — F41.1 GAD (GENERALIZED ANXIETY DISORDER): ICD-10-CM

## 2024-02-28 DIAGNOSIS — Z79.899 CHRONIC PRESCRIPTION BENZODIAZEPINE USE: ICD-10-CM

## 2024-02-28 DIAGNOSIS — G47.09 OTHER INSOMNIA: ICD-10-CM

## 2024-02-28 DIAGNOSIS — F43.12 CHRONIC POST-TRAUMATIC STRESS DISORDER (PTSD): ICD-10-CM

## 2024-02-28 PROCEDURE — 99214 OFFICE O/P EST MOD 30 MIN: CPT | Mod: 95 | Performed by: PSYCHIATRY & NEUROLOGY

## 2024-02-28 RX ORDER — ZOLPIDEM TARTRATE 10 MG/1
10 TABLET ORAL NIGHTLY PRN
Qty: 30 TABLET | Refills: 5 | Status: SHIPPED | OUTPATIENT
Start: 2024-02-28 | End: 2024-03-29

## 2024-02-28 RX ORDER — BUPROPION HYDROCHLORIDE 300 MG/1
300 TABLET ORAL EVERY MORNING
Qty: 90 TABLET | Refills: 2 | Status: SHIPPED | OUTPATIENT
Start: 2024-02-28

## 2024-02-28 RX ORDER — LORAZEPAM 0.5 MG/1
0.5 TABLET ORAL
Qty: 30 TABLET | Refills: 0 | Status: SHIPPED | OUTPATIENT
Start: 2024-02-28 | End: 2024-03-29

## 2024-02-28 RX ORDER — SERTRALINE HYDROCHLORIDE 100 MG/1
200 TABLET, FILM COATED ORAL DAILY
Qty: 180 TABLET | Refills: 2 | Status: SHIPPED | OUTPATIENT
Start: 2024-02-28

## 2024-02-28 RX ORDER — PRAZOSIN HYDROCHLORIDE 2 MG/1
4 CAPSULE ORAL NIGHTLY
Qty: 180 CAPSULE | Refills: 2 | Status: SHIPPED | OUTPATIENT
Start: 2024-02-28

## 2024-02-28 RX ORDER — PROPRANOLOL HYDROCHLORIDE 20 MG/1
TABLET ORAL
Qty: 180 TABLET | Refills: 2 | Status: SHIPPED | OUTPATIENT
Start: 2024-02-28

## 2024-02-28 NOTE — PROGRESS NOTES
This evaluation was conducted via Zoom using secure and encrypted videoconferencing technology. The patient was in their home in the [facility].    The patient's identity was confirmed and verbal consent was obtained for this virtual visit.      PSYCHIATRY FOLLOW-UP NOTE      Name: Mike Rios  MRN: [id number]  : 1975  Age: 48 y.o.  Date of assessment: 2024  PCP: Aamir Shepherd P.A.-C.  Persons in attendance: Patient      REASON FOR VISIT/CHIEF COMPLAINT (as stated by Patient):  Mike Rios is a 48 y.o.,   White male, attending follow-up appointment for mood and anxiety management.      HISTORY OF PRESENT ILLNESS:  Mkie Rios is a 48 y.o. old male with MDD, LAKHWINDER and PTSD comes in today for follow up. Patient was last seen 7 months ago, and following treatment planning recommendations were done:  Continue Sertraline 200 mg daily for depression, anxiety and PTSD management.  Continue Wellbutrin  mg daily for depression augmentation.  Continue Prazosin to 4 mg at bedtime for PTSD nightmares management.  Continue propanolol 20 mg BID PRN for morning anxiety. Do not use this medication within 6 hours of prazosin use.  Continue Ambien 10 mg at bedtime as needed for sleep.    Continue Ativan 0.5 mg as needed for severe anxiety or panic attacks only.  Controlled medication treatment agreement sent to patient's my chart today.  Patient currently not interested in psychotherapy.      Compliant with medications with no major side effects.  Describes overall doing well with improving mood and anxiety.   Nightmares better on prazosin.  Ativan: keeping this as last resort: 3 times/week last week due to work-related stressors but overall keeping it minimal.  Agreed with keeping this as a last resort to prevent the risk of dependency.  Patient prefers to stay on the same combination moving forward.    Patient is not taking propanolol at least 6 hours within prazosin use.  Educated  again on risk of additive hypotension.    Controlled medication treatment [facility]      CURRENT MEDICATIONS:  Current Outpatient Medications   Medication Sig Dispense Refill    finasteride (PROSCAR) 5 MG Tab TAKE 1 TABLET BY MOUTH EVERY DAY 90 Tablet 3    sumatriptan (IMITREX) 100 MG tablet TAKE 1 TABLET BY MOUTH 1 TIME AS NEEDED FOR MIGRAINE. MAY TAKE SECOND TABLET 2 HOURS AFTER IF STILL AT ONSET OF HEADACHE 9 Tablet 11    atorvastatin (LIPITOR) 40 MG Tab Take 1 Tablet by mouth every day for 360 days. 90 Tablet 3    buPROPion (WELLBUTRIN XL) 300 MG XL tablet Take 1 Tablet by mouth every morning. 90 Tablet 2    prazosin (MINIPRESS) 2 MG Cap Take 2 Capsules by mouth every evening. 180 Capsule 2    propranolol (INDERAL) 20 MG Tab TAKE 1 TABLET BY MOUTH TWICE DAILY AS NEEDED FOR ANXIETY Strength: 20 mg 180 Tablet 2    sertraline (ZOLOFT) 100 MG Tab Take 2 Tablets by mouth every day. 180 Tablet 2    omeprazole (PRILOSEC) 20 MG delayed-release capsule TAKE 1 CAPSULE BY MOUTH TWICE DAILY. 30 MINS PRIOR TO SAME MEAL 180 Capsule 2    QUEtiapine (SEROQUEL) 25 MG Tab TAKE 1 TABLET BY MOUTH EVERY DAY 90 Tablet 2    meclizine (ANTIVERT) 25 MG Tab TAKE 1 TABLET BY MOUTH THREE TIMES DAILY AS NEEDED FOR VERTIGO 90 Tablet 1    sildenafil citrate (VIAGRA) 100 MG tablet TAKE 1 TABLET BY MOUTH EVERY DAY 30 MINUTES BEFORE ACTIVITY 9 Tablet 11    indomethacin (INDOCIN) 25 MG Cap TAKE 1 CAPSULE BY MOUTH THREE TIMES DAILY FOR 3 DAYS THEN 2 CAPSULES THREE TIMES DAILY FOR 3 DAYS THEN 3 CAPSULES THREE TIMES DAILY FOR 3 DAYS 54 Capsule 0    Hypromell-Glycerin-Naphazoline (CLEAR EYES FOR DRY EYES PLUS) 0.8-0.25-0.012 % Solution Administer 1 Drop into affected eye(s) every four hours as needed (Bilateral). 30 mL 11    DYMISTA 137-50 MCG/ACT Suspension Administer 2 Sprays into affected nostril(S) every day. 23 g 5    finasteride (PROPECIA) 1 MG tablet Take 1 Tablet by mouth every day. 90 Tablet 2    albuterol  108 (90 Base) MCG/ACT Aero Soln inhalation aerosol Inhale 2 Puffs every four hours as needed. 18 g 11    naproxen (NAPROSYN) 500 MG Tab TAKE 1 TABLET BY MOUTH TWICE DAILY WITH MEALS 60 Tablet 0    cyclobenzaprine (FLEXERIL) 10 mg Tab Take 1 Tablet by mouth 3 times a day as needed. 60 Tablet 5     No current facility-administered medications for this visit.       MEDICAL HISTORY  Past Medical History:   Diagnosis Date    Back pain     Central sleep apnea     Depression     FLIP (obstructive sleep apnea)      Past Surgical History:   Procedure Laterality Date    HERNIA REPAIR         PAST PSYCHIATRIC MEDICATIONS  Sertraline  Bupropion  Prazosin  Quetiapine  Ativan  Ambien      REVIEW OF SYSTEMS:        Constitutional negative   Eyes negative   Ears/Nose/Mouth/Throat  Sleep Apnea on CPAP   Cardiovascular  Hyperlipidemia   Respiratory negative   Gastrointestinal  GERD   Genitourinary negative   Muscular negative   Integumentary negative   Neurological  migraines   Endocrine negative   Hematologic/Lymphatic negative     PHYSICAL EXAMINAION:  Vital signs: There were no vitals taken for this visit.  Musculoskeletal: Normal gait.   Abnormal movements: none      MENTAL STATUS EXAMINATION      General:   - Grooming and hygiene: Casual,   - Apparent distress: none,   - Behavior: Calm  - Eye Contact:  Good,   - no psychomotor agitation or retardation    - Participation: Active verbal participation  Orientation: Alert and Fully Oriented to person, place and time  Mood: Euthymic  Affect: Flexible and Full range,  Thought Process: Logical and Goal-directed  Thought Content: Denies suicidal or homicidal ideations, intent or plan   Perception: Denies auditory or visual hallucinations. No delusions noted   Attention span and concentration: Intact   Speech:Rate within normal limits and Volume within normal limits  Language: Appropriate   Insight: Good  Judgment: Good  Recent and remote memory: No gross evidence of memory  deficits        DEPRESSION SCREENIN/27/2018     7:38 AM 2022     3:30 PM 2023     1:36 PM   Depression Screen (PHQ-2/PHQ-9)   PHQ-2 Total Score 0  2   PHQ-2 Total Score  4    PHQ-9 Total Score 0  15   PHQ-9 Total Score  19        Interpretation of PHQ-9 Total Score   Score Severity   1-4 No Depression   5-9 Mild Depression   10-14 Moderate Depression   15-19 Moderately Severe Depression   20-27 Severe Depression    CURRENT RISK:       Suicidal: Low       Homicidal: Low       Self-Harm: Low       Relapse: Low       Crisis Safety Plan Reviewed Not Indicated       If evidence of imminent risk is present, intervention/plan:      MEDICAL RECORDS/LABS/DIAGNOSTIC TESTS REVIEWED:  No new lab since last visit     [place]  records -   Reviewed     PLAN:  (1) MDD; (2) LAKHWINDER; (3) PTSD; (4) Panic attacks   Slow improvement  Continue Sertraline 200 mg daily for depression, anxiety and PTSD management.  Continue Wellbutrin  mg daily for depression augmentation.  Continue Prazosin to 4 mg at bedtime for PTSD nightmares management.  Continue propanolol 20 mg BID PRN for morning anxiety. Do not use this medication within 6 hours of prazosin use.  Continue Ambien 10 mg at bedtime as needed for sleep.    Continue Ativan 0.5 mg as needed for severe anxiety or panic attacks only.  Controlled medication treatment agreement sent to patient's my chart today.  Patient currently not interested in psychotherapy.  Medication options, alternatives (including no medications) and medication risks/benefits/side effects were discussed in detail.  The patient was advised to call, message provider on [employer], or come in to the clinic if symptoms worsen or if any future questions/issues regarding their medications arise; the patient verbalized understanding and agreement.    The patient was educated to call 911, call the suicide hotline, or go to local ER if having thoughts of suicide or homicide; verbalized  understanding.    Billing Coding based on:  35518 based on MDM    Return to clinic in 6 months or sooner if symptoms worsen.  Next Appointment: instruction provided on how to make the next appointment.     The proposed treatment plan was discussed with the patient who was provided the opportunity to ask questions and make suggestions regarding alternative treatment. Patient verbalized understanding and expressed agreement with the plan.       Paul Lin M.D.  02/28/24    This note was created using voice recognition software (Dragon). The accuracy of the dictation is limited by the abilities of the software. I have reviewed the note prior to signing, however some errors in grammar and context are still possible. If you have any questions related to this note please do not hesitate to contact our office.

## 2024-03-04 DIAGNOSIS — L65.9 HAIR LOSS: ICD-10-CM

## 2024-03-04 RX ORDER — FINASTERIDE 1 MG/1
1 TABLET, FILM COATED ORAL DAILY
Qty: 90 TABLET | Refills: 2 | Status: SHIPPED | OUTPATIENT
Start: 2024-03-04

## 2024-03-08 ENCOUNTER — HOSPITAL ENCOUNTER (OUTPATIENT)
Dept: LAB | Facility: MEDICAL CENTER | Age: 49
End: 2024-03-08
Attending: PHYSICIAN ASSISTANT
Payer: OTHER GOVERNMENT

## 2024-03-08 DIAGNOSIS — R73.03 PREDIABETES: ICD-10-CM

## 2024-03-08 DIAGNOSIS — E78.2 MIXED HYPERLIPIDEMIA: ICD-10-CM

## 2024-03-08 DIAGNOSIS — R79.89 ELEVATED FERRITIN: ICD-10-CM

## 2024-03-08 LAB
EST. AVERAGE GLUCOSE BLD GHB EST-MCNC: 114 MG/DL
HBA1C MFR BLD: 5.6 % (ref 4–5.6)

## 2024-03-08 PROCEDURE — 83036 HEMOGLOBIN GLYCOSYLATED A1C: CPT

## 2024-03-08 PROCEDURE — 80061 LIPID PANEL: CPT

## 2024-03-08 PROCEDURE — 36415 COLL VENOUS BLD VENIPUNCTURE: CPT

## 2024-03-08 PROCEDURE — 82728 ASSAY OF FERRITIN: CPT

## 2024-03-09 LAB
CHOLEST SERPL-MCNC: 251 MG/DL (ref 100–199)
FASTING STATUS PATIENT QL REPORTED: NORMAL
FERRITIN SERPL-MCNC: 801 NG/ML (ref 22–322)
HDLC SERPL-MCNC: 37 MG/DL
LDLC SERPL CALC-MCNC: 175 MG/DL
TRIGL SERPL-MCNC: 197 MG/DL (ref 0–149)

## 2024-03-13 ENCOUNTER — HOSPITAL ENCOUNTER (OUTPATIENT)
Dept: HEMATOLOGY ONCOLOGY | Facility: MEDICAL CENTER | Age: 49
End: 2024-03-13
Attending: INTERNAL MEDICINE
Payer: OTHER GOVERNMENT

## 2024-03-13 ENCOUNTER — HOSPITAL ENCOUNTER (OUTPATIENT)
Dept: LAB | Facility: MEDICAL CENTER | Age: 49
End: 2024-03-13
Attending: INTERNAL MEDICINE
Payer: OTHER GOVERNMENT

## 2024-03-13 VITALS
WEIGHT: 213.85 LBS | BODY MASS INDEX: 28.34 KG/M2 | HEART RATE: 87 BPM | HEIGHT: 73 IN | OXYGEN SATURATION: 97 % | TEMPERATURE: 97.4 F | RESPIRATION RATE: 13 BRPM | DIASTOLIC BLOOD PRESSURE: 78 MMHG | SYSTOLIC BLOOD PRESSURE: 132 MMHG

## 2024-03-13 DIAGNOSIS — R71.8 MICROCYTOSIS: ICD-10-CM

## 2024-03-13 DIAGNOSIS — E83.110 HEREDITARY HEMOCHROMATOSIS (HCC): ICD-10-CM

## 2024-03-13 DIAGNOSIS — R79.89 ELEVATED FERRITIN: ICD-10-CM

## 2024-03-13 LAB
HGB RETIC QN AUTO: 28.1 PG/CELL (ref 29–35)
IMM RETICS NFR: 11.6 % (ref 2.6–16.1)
RETICS # AUTO: 0.02 M/UL (ref 0.04–0.12)
RETICS/RBC NFR: 1.5 % (ref 0.8–2.6)

## 2024-03-13 PROCEDURE — 83021 HEMOGLOBIN CHROMOTOGRAPHY: CPT

## 2024-03-13 PROCEDURE — 83615 LACTATE (LD) (LDH) ENZYME: CPT

## 2024-03-13 PROCEDURE — 99212 OFFICE O/P EST SF 10 MIN: CPT | Performed by: INTERNAL MEDICINE

## 2024-03-13 PROCEDURE — 99204 OFFICE O/P NEW MOD 45 MIN: CPT | Performed by: INTERNAL MEDICINE

## 2024-03-13 PROCEDURE — 85046 RETICYTE/HGB CONCENTRATE: CPT

## 2024-03-13 PROCEDURE — 83020 HEMOGLOBIN ELECTROPHORESIS: CPT

## 2024-03-13 PROCEDURE — 36415 COLL VENOUS BLD VENIPUNCTURE: CPT

## 2024-03-13 RX ORDER — ATROPINE SULFATE 1 MG/ML
0.5 INJECTION, SOLUTION INTRAVENOUS PRN
Status: CANCELLED | OUTPATIENT
Start: 2024-03-15

## 2024-03-13 RX ORDER — SODIUM CHLORIDE 9 MG/ML
500 INJECTION, SOLUTION INTRAVENOUS ONCE
Status: CANCELLED | OUTPATIENT
Start: 2024-03-15 | End: 2024-03-15

## 2024-03-13 ASSESSMENT — PAIN SCALES - GENERAL: PAINLEVEL: NO PAIN

## 2024-03-13 ASSESSMENT — FIBROSIS 4 INDEX: FIB4 SCORE: 0.63

## 2024-03-14 LAB — LDH SERPL L TO P-CCNC: 228 U/L (ref 107–266)

## 2024-03-15 ENCOUNTER — TELEMEDICINE (OUTPATIENT)
Dept: MEDICAL GROUP | Facility: MEDICAL CENTER | Age: 49
End: 2024-03-15
Payer: OTHER GOVERNMENT

## 2024-03-15 VITALS — HEIGHT: 73 IN | BODY MASS INDEX: 27.83 KG/M2 | WEIGHT: 210 LBS

## 2024-03-15 DIAGNOSIS — E83.110 HEREDITARY HEMOCHROMATOSIS (HCC): ICD-10-CM

## 2024-03-15 DIAGNOSIS — E78.2 MIXED HYPERLIPIDEMIA: ICD-10-CM

## 2024-03-15 PROBLEM — R73.03 PREDIABETES: Status: RESOLVED | Noted: 2019-03-22 | Resolved: 2024-03-15

## 2024-03-15 PROCEDURE — 99214 OFFICE O/P EST MOD 30 MIN: CPT | Mod: 95 | Performed by: PHYSICIAN ASSISTANT

## 2024-03-15 ASSESSMENT — FIBROSIS 4 INDEX: FIB4 SCORE: 0.63

## 2024-03-15 ASSESSMENT — PATIENT HEALTH QUESTIONNAIRE - PHQ9: CLINICAL INTERPRETATION OF PHQ2 SCORE: 0

## 2024-03-15 NOTE — ASSESSMENT & PLAN NOTE
Recent cholesterol levels were not controlled.  Turns out he has not been taking his cholesterol medication for quite some time.  He is on Lipitor 40 mg.  Other lab markers were good.

## 2024-03-15 NOTE — ASSESSMENT & PLAN NOTE
This is a pleasant 49-year-old male here today to follow-up on his health and recent labs.  Recently seen by Dr. Almaguer.  Diagnosed with hereditary hemochromatosis.  Hopefully soon will be scheduled for monthly phlebotomies.

## 2024-03-15 NOTE — PROGRESS NOTES
Subjective:   Mike Rios is a 49 y.o. male here today for hemochromatosis and hyperlipidemia.    This evaluation was conducted via Zoom using secure and encrypted videoconferencing technology. The patient was in their home in the [facility].    The patient's identity was confirmed and verbal consent was obtained for this virtual visit.      Hereditary hemochromatosis (HCC)  This is a pleasant 49-year-old male here today to follow-up on his health and recent labs.  Recently seen by Dr. Almaguer.  Diagnosed with hereditary hemochromatosis.  Hopefully soon will be scheduled for monthly phlebotomies.     Mixed hyperlipidemia  Recent cholesterol levels were not controlled.  Turns out he has not been taking his cholesterol medication for quite some time.  He is on Lipitor 40 mg.  Other lab markers were good.       Current medicines (including changes today)  Current Outpatient Medications   Medication Sig Dispense Refill    finasteride (PROPECIA) 1 MG tablet Take 1 Tablet by mouth every day. 90 Tablet 2    buPROPion (WELLBUTRIN XL) 300 MG XL tablet Take 1 Tablet by mouth every morning. 90 Tablet 2    prazosin (MINIPRESS) 2 MG Cap Take 2 Capsules by mouth every evening. 180 Capsule 2    propranolol (INDERAL) 20 MG Tab TAKE 1 TABLET BY MOUTH TWICE DAILY AS NEEDED FOR ANXIETY Strength: 20 mg 180 Tablet 2    sertraline (ZOLOFT) 100 MG Tab Take 2 Tablets by mouth every day. 180 Tablet 2    LORazepam (ATIVAN) 0.5 MG Tab Take 1 Tablet by mouth 1 time a day as needed for Anxiety for up to 30 days. (Avoid daily use) 30 Tablet 0    zolpidem (AMBIEN) 10 MG Tab Take 1 Tablet by mouth at bedtime as needed for Sleep for up to 30 days. 30 Tablet 5    finasteride (PROSCAR) 5 MG Tab TAKE 1 TABLET BY MOUTH EVERY DAY 90 Tablet 3    sumatriptan (IMITREX) 100 MG tablet TAKE 1 TABLET BY MOUTH 1 TIME AS NEEDED FOR MIGRAINE. MAY TAKE SECOND TABLET 2 HOURS AFTER IF STILL AT ONSET OF HEADACHE 9 Tablet 11    atorvastatin (LIPITOR) 40 MG  "Tab Take 1 Tablet by mouth every day for 360 days. 90 Tablet 3    omeprazole (PRILOSEC) 20 MG delayed-release capsule TAKE 1 CAPSULE BY MOUTH TWICE DAILY. 30 MINS PRIOR TO SAME MEAL 180 Capsule 2    QUEtiapine (SEROQUEL) 25 MG Tab TAKE 1 TABLET BY MOUTH EVERY DAY 90 Tablet 2    meclizine (ANTIVERT) 25 MG Tab TAKE 1 TABLET BY MOUTH THREE TIMES DAILY AS NEEDED FOR VERTIGO 90 Tablet 1    sildenafil citrate (VIAGRA) 100 MG tablet TAKE 1 TABLET BY MOUTH EVERY DAY 30 MINUTES BEFORE ACTIVITY 9 Tablet 11    indomethacin (INDOCIN) 25 MG Cap TAKE 1 CAPSULE BY MOUTH THREE TIMES DAILY FOR 3 DAYS THEN 2 CAPSULES THREE TIMES DAILY FOR 3 DAYS THEN 3 CAPSULES THREE TIMES DAILY FOR 3 DAYS 54 Capsule 0    Hypromell-Glycerin-Naphazoline (CLEAR EYES FOR DRY EYES PLUS) 0.8-0.25-0.012 % Solution Administer 1 Drop into affected eye(s) every four hours as needed (Bilateral). 30 mL 11    DYMISTA 137-50 MCG/ACT Suspension Administer 2 Sprays into affected nostril(S) every day. 23 g 5    albuterol 108 (90 Base) MCG/ACT Aero Soln inhalation aerosol Inhale 2 Puffs every four hours as needed. 18 g 11    naproxen (NAPROSYN) 500 MG Tab TAKE 1 TABLET BY MOUTH TWICE DAILY WITH MEALS 60 Tablet 0    cyclobenzaprine (FLEXERIL) 10 mg Tab Take 1 Tablet by mouth 3 times a day as needed. 60 Tablet 5     No current facility-administered medications for this visit.     He  has a past medical history of Back pain, Central sleep apnea, Depression, and FLIP (obstructive sleep apnea).    He has no past medical history of ASTHMA, Diabetes, Seizure (HCC), or Ulcer.    Social History and Family History were reviewed and updated.    ROS   No chest pain, no shortness of breath, no abdominal pain and all other systems were reviewed and are negative.       Objective:     Ht 1.854 m (6' 1\")   Wt 95.3 kg (210 lb)  Body mass index is 27.71 kg/m².   Physical Exam:  Constitutional: Alert, no distress.  Skin: Warm, dry, good turgor, no rashes in visible areas.  Eye: Equal, " round and reactive, conjunctiva clear, lids normal.  ENMT: Lips without lesions, good dentition, oropharynx clear.  Neck: Trachea midline, no masses.   Psych: Alert and oriented x3, normal affect and mood.        Assessment and Plan:   The following treatment plan was discussed    1. Hereditary hemochromatosis (HCC)  New condition noted in chart but chronic.  Continue to follow with Dr. Almaguer.  Hopefully monthly phlebotomies will be ordered soon.  Recent ferritin level did drop.  - Comp Metabolic Panel; Future    2. Mixed hyperlipidemia  Chronic condition.  Uncontrolled.  He has restarted Lipitor at 40 mg.  Check his labs and add a CMP for 6 weeks.  Fast 8 hours.  - Lipid Profile; Future     Appears to be doing much better with his history of anxiety and depression.  Reviewed his medications list.  Continue to follow with psychiatry.    Followup: Return in about 6 months (around 9/15/2024), or if symptoms worsen or fail to improve.    Please note that this dictation was created using voice recognition software. I have made every reasonable attempt to correct obvious errors, but I expect that there are errors of grammar and possibly content that I did not discover before finalizing the note.

## 2024-03-18 LAB
HGB A MFR BLD ELPH: 71.9 % (ref 94.3–98.5)
HGB A1 MFR BLD: NORMAL % (ref 95–97.9)
HGB A2 MFR BLD ELPH: 3.3 % (ref 1.5–3.7)
HGB A2 MFR BLD: NORMAL % (ref 2–3.5)
HGB C MFR BLD ELPH: 0 % (ref 0–0)
HGB C MFR BLD: NORMAL % (ref 0–0)
HGB E MFR BLD: 24.8 % (ref 0–0)
HGB E MFR BLD: NORMAL % (ref 0–0)
HGB F MFR BLD ELPH: 0 % (ref 0–2)
HGB F MFR BLD: NORMAL % (ref 0–2.1)
HGB FRACT BLD CE-IMP: ABNORMAL
HGB FRACT BLD ELPH-IMP: NORMAL
HGB OTHER MFR BLD ELPH: 0 % (ref 0–0)
HGB OTHER MFR BLD: NORMAL % (ref 0–0)
HGB S BLD QL SOLY: NORMAL
HGB S MFR BLD ELPH: 0 % (ref 0–0)
HGB S MFR BLD: NORMAL % (ref 0–0)
PATH INTERP BLD-IMP: NORMAL

## 2024-03-31 ENCOUNTER — OUTPATIENT INFUSION SERVICES (OUTPATIENT)
Dept: ONCOLOGY | Facility: MEDICAL CENTER | Age: 49
End: 2024-03-31
Attending: INTERNAL MEDICINE
Payer: OTHER GOVERNMENT

## 2024-03-31 VITALS
DIASTOLIC BLOOD PRESSURE: 79 MMHG | OXYGEN SATURATION: 97 % | RESPIRATION RATE: 18 BRPM | TEMPERATURE: 96.7 F | BODY MASS INDEX: 29 KG/M2 | HEART RATE: 74 BPM | SYSTOLIC BLOOD PRESSURE: 118 MMHG | WEIGHT: 219.8 LBS

## 2024-03-31 DIAGNOSIS — E83.110 HEREDITARY HEMOCHROMATOSIS (HCC): ICD-10-CM

## 2024-03-31 LAB
FERRITIN SERPL-MCNC: 782 NG/ML (ref 22–322)
HCT VFR BLD AUTO: 43.8 % (ref 42–52)
HGB BLD-MCNC: 15.3 G/DL (ref 14–18)

## 2024-03-31 PROCEDURE — 306780 HCHG STAT FOR TRANSFUSION PER CASE

## 2024-03-31 RX ORDER — ATROPINE SULFATE 1 MG/ML
0.5 INJECTION, SOLUTION INTRAVENOUS PRN
OUTPATIENT
Start: 2024-03-31

## 2024-03-31 RX ORDER — SODIUM CHLORIDE 9 MG/ML
500 INJECTION, SOLUTION INTRAVENOUS ONCE
OUTPATIENT
Start: 2024-03-31 | End: 2024-03-31

## 2024-03-31 ASSESSMENT — FIBROSIS 4 INDEX: FIB4 SCORE: 0.63

## 2024-03-31 NOTE — PROGRESS NOTES
Mike came into infusion services today for therapeutic phlebotomy. No complaints. First time education provided and answered any questions pt had. PIV started in the left AC, +blood return, labs drawn, flushed well. Hgb 15.3, Hct 43.8. Pt meets parameters for phlebotomy. 500ml of blood drawn from pt. Tolerated well. Orthostatic vitals stable, see flowsheets. PIV removed, covered with gauze and coban. Pt has future appointments. Discharged to self care.

## 2024-04-28 ENCOUNTER — OUTPATIENT INFUSION SERVICES (OUTPATIENT)
Dept: ONCOLOGY | Facility: MEDICAL CENTER | Age: 49
End: 2024-04-28
Attending: INTERNAL MEDICINE
Payer: OTHER GOVERNMENT

## 2024-04-28 VITALS
HEIGHT: 73 IN | SYSTOLIC BLOOD PRESSURE: 119 MMHG | WEIGHT: 218.26 LBS | DIASTOLIC BLOOD PRESSURE: 81 MMHG | RESPIRATION RATE: 18 BRPM | OXYGEN SATURATION: 96 % | BODY MASS INDEX: 28.93 KG/M2 | HEART RATE: 81 BPM | TEMPERATURE: 97 F

## 2024-04-28 DIAGNOSIS — K21.9 GASTROESOPHAGEAL REFLUX DISEASE: ICD-10-CM

## 2024-04-28 DIAGNOSIS — E83.110 HEREDITARY HEMOCHROMATOSIS (HCC): ICD-10-CM

## 2024-04-28 DIAGNOSIS — F33.1 MAJOR DEPRESSIVE DISORDER, RECURRENT EPISODE, MODERATE (HCC): ICD-10-CM

## 2024-04-28 LAB
FERRITIN SERPL-MCNC: 636 NG/ML (ref 22–322)
HCT VFR BLD AUTO: 44.3 % (ref 42–52)
HGB BLD-MCNC: 14.9 G/DL (ref 14–18)

## 2024-04-28 PROCEDURE — 36000 PLACE NEEDLE IN VEIN: CPT

## 2024-04-28 PROCEDURE — 85018 HEMOGLOBIN: CPT

## 2024-04-28 PROCEDURE — 85014 HEMATOCRIT: CPT

## 2024-04-28 PROCEDURE — 99195 PHLEBOTOMY: CPT

## 2024-04-28 PROCEDURE — 82728 ASSAY OF FERRITIN: CPT

## 2024-04-28 RX ORDER — ATROPINE SULFATE 1 MG/ML
0.5 INJECTION, SOLUTION INTRAVENOUS PRN
OUTPATIENT
Start: 2024-04-28

## 2024-04-28 RX ORDER — SODIUM CHLORIDE 9 MG/ML
500 INJECTION, SOLUTION INTRAVENOUS ONCE
OUTPATIENT
Start: 2024-04-28 | End: 2024-04-28

## 2024-04-28 ASSESSMENT — FIBROSIS 4 INDEX: FIB4 SCORE: 0.63

## 2024-04-28 NOTE — PROGRESS NOTES
Mike arrived to [facility] for monthly TP. PIV established in left AC and labs drawn. Hgb 14.9. 500 ml whole blood removed. Pt tolerated well. VSS, see flowsheets. Pt left on foot in NAD and message sent to scheduling for next appointment.

## 2024-04-29 RX ORDER — QUETIAPINE FUMARATE 25 MG/1
25 TABLET, FILM COATED ORAL DAILY
Qty: 90 TABLET | Refills: 2 | Status: SHIPPED | OUTPATIENT
Start: 2024-04-29

## 2024-04-29 RX ORDER — OMEPRAZOLE 20 MG/1
CAPSULE, DELAYED RELEASE ORAL
Qty: 180 CAPSULE | Refills: 2 | Status: SHIPPED | OUTPATIENT
Start: 2024-04-29

## 2024-05-07 DIAGNOSIS — R42 VERTIGO: ICD-10-CM

## 2024-05-07 DIAGNOSIS — F41.1 GAD (GENERALIZED ANXIETY DISORDER): ICD-10-CM

## 2024-05-07 DIAGNOSIS — N52.2 DRUG-INDUCED ERECTILE DYSFUNCTION: ICD-10-CM

## 2024-05-07 DIAGNOSIS — Z79.899 CHRONIC PRESCRIPTION BENZODIAZEPINE USE: ICD-10-CM

## 2024-05-07 RX ORDER — MECLIZINE HYDROCHLORIDE 25 MG/1
25 TABLET ORAL 3 TIMES DAILY PRN
Qty: 90 TABLET | Refills: 1 | Status: SHIPPED | OUTPATIENT
Start: 2024-05-07 | End: 2024-08-05

## 2024-05-07 RX ORDER — SILDENAFIL 100 MG/1
TABLET, FILM COATED ORAL
Qty: 9 TABLET | Refills: 11 | Status: SHIPPED | OUTPATIENT
Start: 2024-05-07

## 2024-05-07 RX ORDER — LORAZEPAM 0.5 MG/1
TABLET ORAL
Qty: 30 TABLET | Refills: 0 | Status: SHIPPED | OUTPATIENT
Start: 2024-05-07 | End: 2024-06-06

## 2024-05-07 NOTE — TELEPHONE ENCOUNTER
Received request via: Pharmacy    Was the patient seen in the last year in this department? Yes    Does the patient have an active prescription (recently filled or refills available) for medication(s) requested? No    [facility]     Does the patient have long term Plus and need 100 day supply (blood pressure, diabetes and cholesterol meds only)? Patient does not have SCP

## 2024-05-26 ENCOUNTER — OUTPATIENT INFUSION SERVICES (OUTPATIENT)
Dept: ONCOLOGY | Facility: MEDICAL CENTER | Age: 49
End: 2024-05-26
Attending: INTERNAL MEDICINE
Payer: OTHER GOVERNMENT

## 2024-05-26 VITALS
OXYGEN SATURATION: 100 % | HEART RATE: 78 BPM | DIASTOLIC BLOOD PRESSURE: 93 MMHG | HEIGHT: 72 IN | SYSTOLIC BLOOD PRESSURE: 129 MMHG | RESPIRATION RATE: 18 BRPM | BODY MASS INDEX: 29.95 KG/M2 | WEIGHT: 221.12 LBS

## 2024-05-26 DIAGNOSIS — E83.110 HEREDITARY HEMOCHROMATOSIS (HCC): ICD-10-CM

## 2024-05-26 LAB
FERRITIN SERPL-MCNC: 485 NG/ML (ref 22–322)
HCT VFR BLD AUTO: 43.9 % (ref 42–52)
HGB BLD-MCNC: 14.5 G/DL (ref 14–18)

## 2024-05-26 RX ORDER — SODIUM CHLORIDE 9 MG/ML
500 INJECTION, SOLUTION INTRAVENOUS ONCE
OUTPATIENT
Start: 2024-05-26 | End: 2024-05-26

## 2024-05-26 RX ORDER — ATROPINE SULFATE 1 MG/ML
0.5 INJECTION, SOLUTION INTRAVENOUS PRN
OUTPATIENT
Start: 2024-05-26

## 2024-05-26 ASSESSMENT — FIBROSIS 4 INDEX: FIB4 SCORE: 0.63

## 2024-05-26 NOTE — PROGRESS NOTES
Mike arrives to [facility] for q 4 week therapeutic phlebotomy for hereditary hemochromatosis. Patient denies acute health concerns. Reports tolerating previous procedures well. 20g PIV placed to LAC, which flushes easily and has brisk blood return. Labs drawn from PIV as ordered. Hgb 14.5; Hct 43.9%, which meets established parameters to proceed with treatment. 500 cc whole blood removed without adverse s/s. Patient denied CP, palpitations, dyspnea, dizziness/lightheadedness during and after procedure. Post-TP VSS. PIV flushed and removed with tip intact. Patient has his next appt. Discharged home to self care in no apparent distress.

## 2024-06-04 NOTE — PROGRESS NOTES
06/17/24    Subjective    Chief Complaint:  Follow up hemochromatosis    HPI:  49 male with heterozygous H63D hereditary hemochromatosis. He has been on monthly therapeutic phlebotomies. Ferritin has been coming down. H/H holding up. He is microcytic due to Hgb E.     ROS:    Constitutional: No weight loss  Skin: No rash or jaundice  HENT: No change in eyesight or hearing  Cardiovascular:No chest pain or arrythmia  Respiratory:No cough or SOB  GI:No nausea, vomiting, diarrhea, constipation  :No dysuria or frequency  Musculoskeletal:No bone or joint pain  Neuro:No sx's of neuropathy  Psych: No complaints    PMH:      Allergies   Allergen Reactions    Pcn [Penicillins] Anaphylaxis       Past Medical History:   Diagnosis Date    Back pain     Central sleep apnea     Depression     FLIP (obstructive sleep apnea)         Past Surgical History:   Procedure Laterality Date    HERNIA REPAIR          Medications:    Current Outpatient Medications on File Prior to Encounter   Medication Sig Dispense Refill    sildenafil citrate (VIAGRA) 100 MG tablet TAKE 1 TABLET BY MOUTH EVERY DAY 30 MINUTES BEFORE ACTIVITY 9 Tablet 11    buPROPion (WELLBUTRIN XL) 300 MG XL tablet Take 1 Tablet by mouth every morning. 90 Tablet 3    meclizine (ANTIVERT) 25 MG Tab Take 1 Tablet by mouth 3 times a day as needed for Vertigo for up to 90 days. 90 Tablet 1    QUEtiapine (SEROQUEL) 25 MG Tab TAKE 1 TABLET BY MOUTH EVERY DAY 90 Tablet 2    omeprazole (PRILOSEC) 20 MG delayed-release capsule TAKE 1 CAPSULE BY MOUTH TWICE DAILY 30 MINUTES PRIOR TO SAME MEAL 180 Capsule 2    finasteride (PROPECIA) 1 MG tablet Take 1 Tablet by mouth every day. 90 Tablet 2    prazosin (MINIPRESS) 2 MG Cap Take 2 Capsules by mouth every evening. 180 Capsule 2    propranolol (INDERAL) 20 MG Tab TAKE 1 TABLET BY MOUTH TWICE DAILY AS NEEDED FOR ANXIETY Strength: 20 mg 180 Tablet 2    sertraline (ZOLOFT) 100 MG Tab Take 2 Tablets by mouth every day. 180 Tablet 2     finasteride (PROSCAR) 5 MG Tab TAKE 1 TABLET BY MOUTH EVERY DAY 90 Tablet 3    sumatriptan (IMITREX) 100 MG tablet TAKE 1 TABLET BY MOUTH 1 TIME AS NEEDED FOR MIGRAINE. MAY TAKE SECOND TABLET 2 HOURS AFTER IF STILL AT ONSET OF HEADACHE 9 Tablet 11    atorvastatin (LIPITOR) 40 MG Tab Take 1 Tablet by mouth every day for 360 days. 90 Tablet 3    indomethacin (INDOCIN) 25 MG Cap TAKE 1 CAPSULE BY MOUTH THREE TIMES DAILY FOR 3 DAYS THEN 2 CAPSULES THREE TIMES DAILY FOR 3 DAYS THEN 3 CAPSULES THREE TIMES DAILY FOR 3 DAYS 54 Capsule 0    Hypromell-Glycerin-Naphazoline (CLEAR EYES FOR DRY EYES PLUS) 0.8-0.25-0.012 % Solution Administer 1 Drop into affected eye(s) every four hours as needed (Bilateral). 30 mL 11    DYMISTA 137-50 MCG/ACT Suspension Administer 2 Sprays into affected nostril(S) every day. 23 g 5    albuterol 108 (90 Base) MCG/ACT Aero Soln inhalation aerosol Inhale 2 Puffs every four hours as needed. 18 g 11    naproxen (NAPROSYN) 500 MG Tab TAKE 1 TABLET BY MOUTH TWICE DAILY WITH MEALS 60 Tablet 0    cyclobenzaprine (FLEXERIL) 10 mg Tab Take 1 Tablet by mouth 3 times a day as needed. 60 Tablet 5     No current facility-administered medications on file prior to encounter.       Social History     Tobacco Use    Smoking status: Never    Smokeless tobacco: Never   Substance Use Topics    Alcohol use: Yes     Alcohol/week: 0.0 oz     Comment: Rare        Family History   Problem Relation Age of Onset    Heart Attack Other     Hypertension Father     Heart Disease Father     Hyperlipidemia Father     Cancer Mother         Lung CA    Psychiatric Illness Neg Hx         Objective    Vitals:    /84 (BP Location: Right arm, Patient Position: Sitting, BP Cuff Size: Adult)   Pulse 63   Temp 36.3 °C (97.3 °F) (Temporal)   Resp 16   Ht 1.829 m (6')   Wt 99.6 kg (219 lb 9.3 oz)   SpO2 98%   BMI 29.78 kg/m²     Physical Exam:    Appears well-developed and well-nourished. No distress.    Head -  Normocephalic .    Eyes - Pupils are equal. Conjunctivae normal. No scleral icterus.   Ears - normal hearing  Neurological -   Alert and oriented.  Skin - Skin is warm and dry. No rash noted. Not diaphoretic. No erythema. No pallor. No jaundice   Psychiatric -  Normal mood and affect.    Labs:     Latest Reference Range & Units 08/28/23 11:12 03/08/24 10:52 03/31/24 07:30 04/28/24 11:05 05/26/24 10:20   Ferritin 22.0 - 322.0 ng/mL 1108.0 (H) 801.0 (H) 782.0 (H) 636.0 (H) 485.0 (H)        Latest Reference Range & Units 03/31/24 07:30 04/28/24 11:05 05/26/24 10:20   Hemoglobin 14.0 - 18.0 g/dL 15.3 14.9 14.5   Hematocrit 42.0 - 52.0 % 43.8 44.3 43.9     Assessment  Improving  Imp:    Visit Diagnosis:    1. Hereditary hemochromatosis (HCC)        2. Microcytosis            Plan:  Continue monthly for now  Return in 3 months    Valente Almaguer M.D.

## 2024-06-17 ENCOUNTER — HOSPITAL ENCOUNTER (OUTPATIENT)
Dept: HEMATOLOGY ONCOLOGY | Facility: MEDICAL CENTER | Age: 49
End: 2024-06-17
Attending: INTERNAL MEDICINE
Payer: OTHER GOVERNMENT

## 2024-06-17 VITALS
HEIGHT: 72 IN | HEART RATE: 63 BPM | WEIGHT: 219.58 LBS | RESPIRATION RATE: 16 BRPM | SYSTOLIC BLOOD PRESSURE: 126 MMHG | DIASTOLIC BLOOD PRESSURE: 84 MMHG | OXYGEN SATURATION: 98 % | BODY MASS INDEX: 29.74 KG/M2 | TEMPERATURE: 97.3 F

## 2024-06-17 DIAGNOSIS — R71.8 MICROCYTOSIS: ICD-10-CM

## 2024-06-17 DIAGNOSIS — E83.110 HEREDITARY HEMOCHROMATOSIS (HCC): ICD-10-CM

## 2024-06-17 PROCEDURE — 99212 OFFICE O/P EST SF 10 MIN: CPT | Performed by: INTERNAL MEDICINE

## 2024-06-17 PROCEDURE — 99213 OFFICE O/P EST LOW 20 MIN: CPT | Performed by: INTERNAL MEDICINE

## 2024-06-17 ASSESSMENT — PAIN SCALES - GENERAL: PAINLEVEL: NO PAIN

## 2024-06-17 ASSESSMENT — FIBROSIS 4 INDEX: FIB4 SCORE: 0.63

## 2024-06-23 ENCOUNTER — OUTPATIENT INFUSION SERVICES (OUTPATIENT)
Dept: ONCOLOGY | Facility: MEDICAL CENTER | Age: 49
End: 2024-06-23
Attending: INTERNAL MEDICINE
Payer: OTHER GOVERNMENT

## 2024-06-23 VITALS
SYSTOLIC BLOOD PRESSURE: 128 MMHG | TEMPERATURE: 97.2 F | RESPIRATION RATE: 18 BRPM | DIASTOLIC BLOOD PRESSURE: 82 MMHG | BODY MASS INDEX: 29.86 KG/M2 | HEIGHT: 72 IN | HEART RATE: 67 BPM | WEIGHT: 220.46 LBS | OXYGEN SATURATION: 96 %

## 2024-06-23 DIAGNOSIS — E83.110 HEREDITARY HEMOCHROMATOSIS (HCC): ICD-10-CM

## 2024-06-23 LAB
FERRITIN SERPL-MCNC: 339 NG/ML (ref 22–322)
HCT VFR BLD AUTO: 44.9 % (ref 42–52)
HGB BLD-MCNC: 14.6 G/DL (ref 14–18)

## 2024-06-23 PROCEDURE — 82728 ASSAY OF FERRITIN: CPT

## 2024-06-23 PROCEDURE — 85018 HEMOGLOBIN: CPT

## 2024-06-23 PROCEDURE — 85014 HEMATOCRIT: CPT

## 2024-06-23 RX ORDER — SODIUM CHLORIDE 9 MG/ML
500 INJECTION, SOLUTION INTRAVENOUS ONCE
OUTPATIENT
Start: 2024-06-23 | End: 2024-06-23

## 2024-06-23 ASSESSMENT — FIBROSIS 4 INDEX: FIB4 SCORE: 0.63

## 2024-06-23 NOTE — PROGRESS NOTES
Patient presents for therapeutic phlebotomy. PIV established and labs drawn as ordered. Hemoglobin 14.6 today. 500 mL whole blood phlebotomized per MD orders. Patient observed for fifteen minutes after phlebotomy. Patient remains stable with stable vitals.  PIV removed cathter tip intact. Compression dressing to site. Patient scheduled for his next appointment and released in no acute distress.  
24.1

## 2024-07-21 ENCOUNTER — OUTPATIENT INFUSION SERVICES (OUTPATIENT)
Dept: ONCOLOGY | Facility: MEDICAL CENTER | Age: 49
End: 2024-07-21
Attending: INTERNAL MEDICINE
Payer: OTHER GOVERNMENT

## 2024-07-21 VITALS
DIASTOLIC BLOOD PRESSURE: 98 MMHG | RESPIRATION RATE: 18 BRPM | HEART RATE: 81 BPM | WEIGHT: 220.9 LBS | HEIGHT: 72 IN | BODY MASS INDEX: 29.92 KG/M2 | OXYGEN SATURATION: 96 % | SYSTOLIC BLOOD PRESSURE: 128 MMHG | TEMPERATURE: 97.3 F

## 2024-07-21 DIAGNOSIS — E83.110 HEREDITARY HEMOCHROMATOSIS (HCC): ICD-10-CM

## 2024-07-21 LAB
FERRITIN SERPL-MCNC: 230 NG/ML (ref 22–322)
HCT VFR BLD AUTO: 43.8 % (ref 42–52)
HGB BLD-MCNC: 14.9 G/DL (ref 14–18)

## 2024-07-21 PROCEDURE — 85014 HEMATOCRIT: CPT

## 2024-07-21 PROCEDURE — 85018 HEMOGLOBIN: CPT

## 2024-07-21 PROCEDURE — 99195 PHLEBOTOMY: CPT

## 2024-07-21 PROCEDURE — 82728 ASSAY OF FERRITIN: CPT

## 2024-07-21 RX ORDER — SODIUM CHLORIDE 9 MG/ML
500 INJECTION, SOLUTION INTRAVENOUS ONCE
OUTPATIENT
Start: 2024-07-21 | End: 2024-07-21

## 2024-07-21 ASSESSMENT — FIBROSIS 4 INDEX: FIB4 SCORE: 0.63

## 2024-08-01 ENCOUNTER — TELEMEDICINE (OUTPATIENT)
Dept: BEHAVIORAL HEALTH | Facility: CLINIC | Age: 49
End: 2024-08-01
Payer: OTHER GOVERNMENT

## 2024-08-01 DIAGNOSIS — F43.12 CHRONIC POST-TRAUMATIC STRESS DISORDER (PTSD): ICD-10-CM

## 2024-08-01 DIAGNOSIS — F33.42 RECURRENT MAJOR DEPRESSIVE DISORDER, IN FULL REMISSION (HCC): ICD-10-CM

## 2024-08-01 DIAGNOSIS — F41.0 PANIC ATTACKS: ICD-10-CM

## 2024-08-01 DIAGNOSIS — F41.1 GAD (GENERALIZED ANXIETY DISORDER): ICD-10-CM

## 2024-08-01 DIAGNOSIS — Z79.899 CHRONIC PRESCRIPTION BENZODIAZEPINE USE: ICD-10-CM

## 2024-08-01 DIAGNOSIS — F33.2 SEVERE EPISODE OF RECURRENT MAJOR DEPRESSIVE DISORDER, WITHOUT PSYCHOTIC FEATURES (HCC): ICD-10-CM

## 2024-08-01 DIAGNOSIS — G47.09 OTHER INSOMNIA: ICD-10-CM

## 2024-08-01 PROCEDURE — 99214 OFFICE O/P EST MOD 30 MIN: CPT | Mod: GT | Performed by: PSYCHIATRY & NEUROLOGY

## 2024-08-01 RX ORDER — SERTRALINE HYDROCHLORIDE 100 MG/1
200 TABLET, FILM COATED ORAL DAILY
Qty: 180 TABLET | Refills: 2 | Status: SHIPPED | OUTPATIENT
Start: 2024-08-01

## 2024-08-01 RX ORDER — LORAZEPAM 0.5 MG/1
0.5 TABLET ORAL
Qty: 30 TABLET | Refills: 5 | Status: SHIPPED | OUTPATIENT
Start: 2024-08-01 | End: 2024-08-31

## 2024-08-01 RX ORDER — PRAZOSIN HYDROCHLORIDE 2 MG/1
4 CAPSULE ORAL NIGHTLY
Qty: 180 CAPSULE | Refills: 2 | Status: SHIPPED | OUTPATIENT
Start: 2024-08-01

## 2024-08-01 RX ORDER — PROPRANOLOL HYDROCHLORIDE 20 MG/1
TABLET ORAL
Qty: 180 TABLET | Refills: 2 | Status: SHIPPED | OUTPATIENT
Start: 2024-08-01

## 2024-08-01 RX ORDER — ZOLPIDEM TARTRATE 10 MG/1
10 TABLET ORAL NIGHTLY PRN
Qty: 30 TABLET | Refills: 5 | Status: SHIPPED | OUTPATIENT
Start: 2024-08-01 | End: 2024-08-31

## 2024-08-01 RX ORDER — BUPROPION HYDROCHLORIDE 300 MG/1
300 TABLET ORAL EVERY MORNING
Qty: 90 TABLET | Refills: 3 | Status: SHIPPED | OUTPATIENT
Start: 2024-08-01

## 2024-08-01 NOTE — PROGRESS NOTES
This evaluation was conducted via Teams using secure and encrypted videoconferencing technology. The patient was in their home in the [facility].    The patient's identity was confirmed and verbal consent was obtained for this virtual visit.      PSYCHIATRY FOLLOW-UP NOTE      Name: Mike Rios  MRN: [id number]  : 1975  Age: 49 y.o.  Date of assessment: 2024  PCP: Aamir Shepherd P.A.-C.  Persons in attendance: Patient      REASON FOR VISIT/CHIEF COMPLAINT (as stated by Patient):  Mike Rios is a 49 y.o.,   White male, attending follow-up appointment for mood and anxiety management.      HISTORY OF PRESENT ILLNESS:  Mike Rios is a 49 y.o. old male with MDD, LAKHWINDER, PTSD and panic attacks comes in today for follow up. Patient was last seen 6 months ago, and following treatment planning recommendations were done:  Continue Sertraline 200 mg daily for depression, anxiety and PTSD management.  Continue Wellbutrin  mg daily for depression augmentation.  Continue Prazosin 4 mg at bedtime for PTSD nightmares management.  Continue propanolol 20 mg BID PRN for morning anxiety. Do not use this medication within 6 hours of prazosin use.  Continue Ambien 10 mg at bedtime as needed for sleep.    Continue Ativan 0.5 mg as needed for severe anxiety or panic attacks only.  Controlled medication treatment agreement sent to patient's my chart today.  Patient currently not interested in psychotherapy.    History of Present Illness    The patient reports overall well-being, attributing this improvement to his current medication regimen, specifically lorazepam, which he finds beneficial.   His current medication regimen includes sertraline 200 mg daily for depression, Wellbutrin 300 mg, prazosin 4 mg at bedtime, and propranolol 40 mg PRN for anxiety management, and Ambien 4 mg at bedtime.   He typically takes Ativan 30 minutes prior to any meeting or presentations to manage his anxiety.    He utilizes a CPAP machine nightly for sleep, averaging 6 to 7 hours per night.   He reports no adverse effects from either of these medications.   His nightmares are well-managed with prazosin.   He is currently working from home, which he believes is contributing to his overall well-being.        CURRENT MEDICATIONS:  Current Outpatient Medications   Medication Sig Dispense Refill    sildenafil citrate (VIAGRA) 100 MG tablet TAKE 1 TABLET BY MOUTH EVERY DAY 30 MINUTES BEFORE ACTIVITY 9 Tablet 11    meclizine (ANTIVERT) 25 MG Tab Take 1 Tablet by mouth 3 times a day as needed for Vertigo for up to 90 days. 90 Tablet 1    buPROPion (WELLBUTRIN XL) 300 MG XL tablet Take 1 Tablet by mouth every morning. 90 Tablet 3    QUEtiapine (SEROQUEL) 25 MG Tab TAKE 1 TABLET BY MOUTH EVERY DAY 90 Tablet 2    omeprazole (PRILOSEC) 20 MG delayed-release capsule TAKE 1 CAPSULE BY MOUTH TWICE DAILY 30 MINUTES PRIOR TO SAME MEAL 180 Capsule 2    finasteride (PROPECIA) 1 MG tablet Take 1 Tablet by mouth every day. 90 Tablet 2    prazosin (MINIPRESS) 2 MG Cap Take 2 Capsules by mouth every evening. 180 Capsule 2    propranolol (INDERAL) 20 MG Tab TAKE 1 TABLET BY MOUTH TWICE DAILY AS NEEDED FOR ANXIETY Strength: 20 mg 180 Tablet 2    sertraline (ZOLOFT) 100 MG Tab Take 2 Tablets by mouth every day. 180 Tablet 2    finasteride (PROSCAR) 5 MG Tab TAKE 1 TABLET BY MOUTH EVERY DAY 90 Tablet 3    sumatriptan (IMITREX) 100 MG tablet TAKE 1 TABLET BY MOUTH 1 TIME AS NEEDED FOR MIGRAINE. MAY TAKE SECOND TABLET 2 HOURS AFTER IF STILL AT ONSET OF HEADACHE 9 Tablet 11    atorvastatin (LIPITOR) 40 MG Tab Take 1 Tablet by mouth every day for 360 days. 90 Tablet 3    indomethacin (INDOCIN) 25 MG Cap TAKE 1 CAPSULE BY MOUTH THREE TIMES DAILY FOR 3 DAYS THEN 2 CAPSULES THREE TIMES DAILY FOR 3 DAYS THEN 3 CAPSULES THREE TIMES DAILY FOR 3 DAYS 54 Capsule 0    Hypromell-Glycerin-Naphazoline (CLEAR EYES FOR DRY EYES PLUS) 0.8-0.25-0.012 % Solution  Administer 1 Drop into affected eye(s) every four hours as needed (Bilateral). 30 mL 11    DYMISTA 137-50 MCG/ACT Suspension Administer 2 Sprays into affected nostril(S) every day. 23 g 5    albuterol 108 (90 Base) MCG/ACT Aero Soln inhalation aerosol Inhale 2 Puffs every four hours as needed. 18 g 11    naproxen (NAPROSYN) 500 MG Tab TAKE 1 TABLET BY MOUTH TWICE DAILY WITH MEALS 60 Tablet 0    cyclobenzaprine (FLEXERIL) 10 mg Tab Take 1 Tablet by mouth 3 times a day as needed. 60 Tablet 5     No current facility-administered medications for this visit.       MEDICAL HISTORY  Past Medical History:   Diagnosis Date    Back pain     Central sleep apnea     Depression     FLIP (obstructive sleep apnea)      Past Surgical History:   Procedure Laterality Date    HERNIA REPAIR         PAST PSYCHIATRIC MEDICATIONS  Sertraline  Bupropion  Prazosin  Quetiapine  Ativan  Ambien      REVIEW OF SYSTEMS:        Constitutional negative   Eyes negative   Ears/Nose/Mouth/Throat  Sleep Apnea on CPAP   Cardiovascular  Hyperlipidemia   Respiratory negative   Gastrointestinal  GERD   Genitourinary negative   Muscular negative   Integumentary negative   Neurological  migraines   Endocrine negative   Hematologic/Lymphatic negative     PHYSICAL EXAMINAION:  Vital signs: There were no vitals taken for this visit.  Musculoskeletal: Normal gait.   Abnormal movements: none      MENTAL STATUS EXAMINATION      General:   - Grooming and hygiene: Casual,   - Apparent distress: none,   - Behavior: Calm  - Eye Contact:  Good,   - no psychomotor agitation or retardation    - Participation: Active verbal participation  Orientation: Alert and Fully Oriented to person, place and time  Mood: Euthymic  Affect: Flexible and Full range,  Thought Process: Logical and Goal-directed  Thought Content: Denies suicidal or homicidal ideations, intent or plan   Perception: Denies auditory or visual hallucinations. No delusions noted   Attention span and  concentration: Intact   Speech:Rate within normal limits and Volume within normal limits  Language: Appropriate   Insight: Good  Judgment: Good  Recent and remote memory: No gross evidence of memory deficits        DEPRESSION SCREENIN/7/2022     3:30 PM 2023     1:36 PM 3/15/2024    10:20 AM   Depression Screen (PHQ-2/PHQ-9)   PHQ-2 Total Score  2    PHQ-2 Total Score 4  0   PHQ-9 Total Score  15    PHQ-9 Total Score 19         Interpretation of PHQ-9 Total Score   Score Severity   1-4 No Depression   5-9 Mild Depression   10-14 Moderate Depression   15-19 Moderately Severe Depression   20-27 Severe Depression    CURRENT RISK:       Suicidal: Low       Homicidal: Low       Self-Harm: Low       Relapse: Low       Crisis Safety Plan Reviewed Not Indicated       If evidence of imminent risk is present, intervention/plan:      MEDICAL RECORDS/LABS/DIAGNOSTIC TESTS REVIEWED:  No new lab since last visit   Results        [place] [facility] records -   Reviewed     ASSESSMENT & PLAN:  Assessment & Plan      PLAN:  (1) MDD; (2) LAKHWINDER; (3) PTSD; (4) Panic attacks   improving  Continue Zoloft 200 mg daily for depression, anxiety and PTSD management.  Continue Wellbutrin  mg daily for depression augmentation.  Continue Prazosin 4 mg at bedtime for PTSD nightmares management.  Continue Propanolol 20 mg BID PRN for morning anxiety. Do not use this medication within 6 hours of prazosin use.  Continue Ambien 10 mg HS PRN for sleep.    Continue Ativan 0.5 mg HS PRN for severe anxiety or panic attacks only.  Controlled medication treatment agreement sent to signed in 2024.  Patient currently not interested in psychotherapy.  Medication options, alternatives (including no medications) and medication risks/benefits/side effects were discussed in detail.  The patient was advised to call, message provider on [facility], or come in to the clinic if symptoms worsen or if any future questions/issues regarding their medications  arise; the patient verbalized understanding and agreement.    The patient was educated to call 911, call the suicide hotline, or go to local ER if having thoughts of suicide or homicide; verbalized understanding.      Billing Coding based on:    Return to clinic in 3-6 months or sooner if symptoms worsen.  Next Appointment: instruction provided on how to make the next appointment.     The proposed treatment plan was discussed with the patient who was provided the opportunity to ask questions and make suggestions regarding alternative treatment. Patient verbalized understanding and expressed agreement with the plan.       Paul Lin M.D.  08/01/24    This note was created using voice recognition software (Dragon). The accuracy of the dictation is limited by the abilities of the software. I have reviewed the note prior to signing, however some errors in grammar and context are still possible. If you have any questions related to this note please do not hesitate to contact our office.

## 2024-08-20 ENCOUNTER — APPOINTMENT (OUTPATIENT)
Dept: MEDICAL GROUP | Facility: MEDICAL CENTER | Age: 49
End: 2024-08-20
Payer: OTHER GOVERNMENT

## 2024-08-25 ENCOUNTER — OUTPATIENT INFUSION SERVICES (OUTPATIENT)
Dept: ONCOLOGY | Facility: MEDICAL CENTER | Age: 49
End: 2024-08-25
Attending: INTERNAL MEDICINE
Payer: OTHER GOVERNMENT

## 2024-08-25 VITALS
HEART RATE: 87 BPM | WEIGHT: 220.46 LBS | RESPIRATION RATE: 18 BRPM | DIASTOLIC BLOOD PRESSURE: 90 MMHG | BODY MASS INDEX: 29.86 KG/M2 | OXYGEN SATURATION: 98 % | SYSTOLIC BLOOD PRESSURE: 123 MMHG | HEIGHT: 72 IN | TEMPERATURE: 96.9 F

## 2024-08-25 DIAGNOSIS — E83.110 HEREDITARY HEMOCHROMATOSIS (HCC): ICD-10-CM

## 2024-08-25 LAB
FERRITIN SERPL-MCNC: 146 NG/ML (ref 22–322)
HCT VFR BLD AUTO: 43.7 % (ref 42–52)
HGB BLD-MCNC: 15 G/DL (ref 14–18)

## 2024-08-25 PROCEDURE — 85014 HEMATOCRIT: CPT

## 2024-08-25 PROCEDURE — 85018 HEMOGLOBIN: CPT

## 2024-08-25 PROCEDURE — 82728 ASSAY OF FERRITIN: CPT

## 2024-08-25 PROCEDURE — 99195 PHLEBOTOMY: CPT

## 2024-08-25 RX ORDER — SODIUM CHLORIDE 9 MG/ML
500 INJECTION, SOLUTION INTRAVENOUS ONCE
OUTPATIENT
Start: 2024-08-25 | End: 2024-08-25

## 2024-08-25 ASSESSMENT — FIBROSIS 4 INDEX: FIB4 SCORE: 0.63

## 2024-08-25 NOTE — PROGRESS NOTES
[facility] phlebotomy for hereditary hemochromatosis. Patient denies acute health concerns. 20g PIV placed to LAC, which flushes easily and has brisk blood return. Labs drawn from PIV as ordered. Hgb 15; Hct 43.7%, which meets established parameters to proceed with treatment. 500 cc whole blood removed without adverse s/s. Patient denied CP, palpitations, dyspnea, dizziness/lightheadedness during and after procedure. Post-TP VSS. PIV flushed and removed with tip intact. Patient has his next appt. Discharged home to self care in no apparent distress.

## 2024-09-03 DIAGNOSIS — E83.110 HEREDITARY HEMOCHROMATOSIS (HCC): ICD-10-CM

## 2024-09-13 DIAGNOSIS — E78.2 MIXED HYPERLIPIDEMIA: ICD-10-CM

## 2024-09-16 RX ORDER — ATORVASTATIN CALCIUM 40 MG/1
40 TABLET, FILM COATED ORAL
Qty: 90 TABLET | Refills: 3 | Status: SHIPPED | OUTPATIENT
Start: 2024-09-16

## 2024-09-16 RX ORDER — SUMATRIPTAN 100 MG/1
TABLET, FILM COATED ORAL
Qty: 9 TABLET | Refills: 11 | Status: SHIPPED | OUTPATIENT
Start: 2024-09-16

## 2024-09-16 NOTE — TELEPHONE ENCOUNTER
Received request via: Pharmacy    Was the patient seen in the last year in this department? Yes    Does the patient have an active prescription (recently filled or refills available) for medication(s) requested? No    [facility]    Does the patient have skilled nursing Plus and need 100-day supply? (This applies to ALL medications) Patient does not have SCP

## 2024-09-22 ENCOUNTER — OUTPATIENT INFUSION SERVICES (OUTPATIENT)
Dept: ONCOLOGY | Facility: MEDICAL CENTER | Age: 49
End: 2024-09-22
Attending: INTERNAL MEDICINE
Payer: OTHER GOVERNMENT

## 2024-09-22 VITALS
HEART RATE: 105 BPM | WEIGHT: 217.37 LBS | SYSTOLIC BLOOD PRESSURE: 129 MMHG | BODY MASS INDEX: 29.44 KG/M2 | DIASTOLIC BLOOD PRESSURE: 90 MMHG | OXYGEN SATURATION: 96 % | HEIGHT: 72 IN | TEMPERATURE: 97.1 F | RESPIRATION RATE: 18 BRPM

## 2024-09-22 DIAGNOSIS — E83.110 HEREDITARY HEMOCHROMATOSIS (HCC): ICD-10-CM

## 2024-09-22 LAB
FERRITIN SERPL-MCNC: 111 NG/ML (ref 22–322)
HCT VFR BLD AUTO: 45.7 % (ref 42–52)
HGB BLD-MCNC: 15.4 G/DL (ref 14–18)

## 2024-09-22 PROCEDURE — 85018 HEMOGLOBIN: CPT

## 2024-09-22 PROCEDURE — 82728 ASSAY OF FERRITIN: CPT

## 2024-09-22 PROCEDURE — 99195 PHLEBOTOMY: CPT

## 2024-09-22 PROCEDURE — 85014 HEMATOCRIT: CPT

## 2024-09-22 RX ORDER — SODIUM CHLORIDE 9 MG/ML
500 INJECTION, SOLUTION INTRAVENOUS ONCE
OUTPATIENT
Start: 2024-09-22 | End: 2024-09-22

## 2024-09-22 ASSESSMENT — FIBROSIS 4 INDEX: FIB4 SCORE: 0.63

## 2024-09-22 NOTE — PROGRESS NOTES
Mike came into infusion services today for therapeutic phlebotomy. No complaints. PIV started in the left AC, +blood return, labs drawn, flushed well. Pt within parameters for phlebotomy. 500ml of blood drawn from pt, tolerated well. Orthostatic vitals stable, see flowsheets. PIV removed, covered with gauze and coban. Pt has future appointments. Discharged to self care.

## 2024-09-27 ENCOUNTER — APPOINTMENT (OUTPATIENT)
Dept: MEDICAL GROUP | Facility: MEDICAL CENTER | Age: 49
End: 2024-09-27
Payer: OTHER GOVERNMENT

## 2024-09-27 VITALS
WEIGHT: 218.2 LBS | SYSTOLIC BLOOD PRESSURE: 120 MMHG | HEART RATE: 93 BPM | BODY MASS INDEX: 29.55 KG/M2 | TEMPERATURE: 96.9 F | DIASTOLIC BLOOD PRESSURE: 72 MMHG | OXYGEN SATURATION: 95 % | HEIGHT: 72 IN

## 2024-09-27 DIAGNOSIS — M72.2 PLANTAR FASCIITIS, BILATERAL: ICD-10-CM

## 2024-09-27 DIAGNOSIS — R42 VERTIGO: ICD-10-CM

## 2024-09-27 DIAGNOSIS — Z12.5 SCREENING PSA (PROSTATE SPECIFIC ANTIGEN): ICD-10-CM

## 2024-09-27 DIAGNOSIS — Z12.83 SKIN CANCER SCREENING: ICD-10-CM

## 2024-09-27 DIAGNOSIS — F33.42 RECURRENT MAJOR DEPRESSIVE DISORDER, IN FULL REMISSION (HCC): ICD-10-CM

## 2024-09-27 DIAGNOSIS — Z00.00 PREVENTATIVE HEALTH CARE: ICD-10-CM

## 2024-09-27 DIAGNOSIS — L98.9 SKIN LESION OF LEFT ARM: ICD-10-CM

## 2024-09-27 PROCEDURE — 99214 OFFICE O/P EST MOD 30 MIN: CPT | Performed by: PHYSICIAN ASSISTANT

## 2024-09-27 PROCEDURE — 3078F DIAST BP <80 MM HG: CPT | Performed by: PHYSICIAN ASSISTANT

## 2024-09-27 PROCEDURE — 3074F SYST BP LT 130 MM HG: CPT | Performed by: PHYSICIAN ASSISTANT

## 2024-09-27 RX ORDER — LORAZEPAM 0.5 MG/1
TABLET ORAL
COMMUNITY
Start: 2024-05-07

## 2024-09-27 RX ORDER — ZOLPIDEM TARTRATE 10 MG/1
10 TABLET ORAL
COMMUNITY
Start: 2024-09-13

## 2024-09-27 ASSESSMENT — PATIENT HEALTH QUESTIONNAIRE - PHQ9
8. MOVING OR SPEAKING SO SLOWLY THAT OTHER PEOPLE COULD HAVE NOTICED. OR THE OPPOSITE, BEING SO FIGETY OR RESTLESS THAT YOU HAVE BEEN MOVING AROUND A LOT MORE THAN USUAL: NOT AT ALL
9. THOUGHTS THAT YOU WOULD BE BETTER OFF DEAD, OR OF HURTING YOURSELF: NOT AT ALL
SUM OF ALL RESPONSES TO PHQ9 QUESTIONS 1 AND 2: 2
1. LITTLE INTEREST OR PLEASURE IN DOING THINGS: SEVERAL DAYS
6. FEELING BAD ABOUT YOURSELF - OR THAT YOU ARE A FAILURE OR HAVE LET YOURSELF OR YOUR FAMILY DOWN: NOT AL ALL
7. TROUBLE CONCENTRATING ON THINGS, SUCH AS READING THE NEWSPAPER OR WATCHING TELEVISION: MORE THAN HALF THE DAYS
5. POOR APPETITE OR OVEREATING: MORE THAN HALF THE DAYS
2. FEELING DOWN, DEPRESSED, IRRITABLE, OR HOPELESS: SEVERAL DAYS
SUM OF ALL RESPONSES TO PHQ QUESTIONS 1-9: 10
3. TROUBLE FALLING OR STAYING ASLEEP OR SLEEPING TOO MUCH: MORE THAN HALF THE DAYS
4. FEELING TIRED OR HAVING LITTLE ENERGY: MORE THAN HALF THE DAYS

## 2024-09-27 ASSESSMENT — FIBROSIS 4 INDEX: FIB4 SCORE: 0.63

## 2024-09-27 NOTE — PROGRESS NOTES
Subjective:     History of Present Illness  The patient presents for evaluation of multiple medical concerns.    He is currently on medication for depression, which he reports as being stable. He continues to see a psychiatrist for this condition.    He experiences occasional vertigo, particularly when sitting down.    He continues to experience pain from plantar fasciitis. Despite seeing an podiatrist, no significant intervention was made. Surgery and injections were considered, but he opted for medication instead.    He has noticed some spots on his left arm that cause discomfort when leaning on a desk. These have been present for over 3 months. He expresses concern about the possibility of these spots being cancerous and is interested in having them removed. H    He has not yet completed his blood work due to a busy schedule.    He is still using the dry eye medication, Dymista, Prazosin, Zyrtec, Zolpidem, and lorazepam.      Current medicines (including changes today)  Current Outpatient Medications   Medication Sig Dispense Refill    zolpidem (AMBIEN) 10 MG Tab Take 10 mg by mouth at bedtime as needed for Sleep.      LORazepam (ATIVAN) 0.5 MG Tab       atorvastatin (LIPITOR) 40 MG Tab TAKE 1 TABLET BY MOUTH EVERY DAY 90 Tablet 3    sumatriptan (IMITREX) 100 MG tablet TAKE 1 TABLET BY MOUTH 1 TIME AS NEEDED FOR MIGRAINE. MAY TAKE SECOND TABLET 2 HOURS AFTER IF STILL AT ONSET OF HEADACHE 9 Tablet 11    buPROPion (WELLBUTRIN XL) 300 MG XL tablet Take 1 Tablet by mouth every morning. 90 Tablet 3    sertraline (ZOLOFT) 100 MG Tab Take 2 Tablets by mouth every day. 180 Tablet 2    propranolol (INDERAL) 20 MG Tab TAKE 1 TABLET BY MOUTH TWICE DAILY AS NEEDED FOR ANXIETY Strength: 20 mg 180 Tablet 2    prazosin (MINIPRESS) 2 MG Cap Take 2 Capsules by mouth every evening. 180 Capsule 2    sildenafil citrate (VIAGRA) 100 MG tablet TAKE 1 TABLET BY MOUTH EVERY DAY 30 MINUTES BEFORE ACTIVITY 9 Tablet 11    omeprazole  "(PRILOSEC) 20 MG delayed-release capsule TAKE 1 CAPSULE BY MOUTH TWICE DAILY 30 MINUTES PRIOR TO SAME MEAL 180 Capsule 2    finasteride (PROPECIA) 1 MG tablet Take 1 Tablet by mouth every day. 90 Tablet 2    finasteride (PROSCAR) 5 MG Tab TAKE 1 TABLET BY MOUTH EVERY DAY 90 Tablet 3    indomethacin (INDOCIN) 25 MG Cap TAKE 1 CAPSULE BY MOUTH THREE TIMES DAILY FOR 3 DAYS THEN 2 CAPSULES THREE TIMES DAILY FOR 3 DAYS THEN 3 CAPSULES THREE TIMES DAILY FOR 3 DAYS 54 Capsule 0    Hypromell-Glycerin-Naphazoline (CLEAR EYES FOR DRY EYES PLUS) 0.8-0.25-0.012 % Solution Administer 1 Drop into affected eye(s) every four hours as needed (Bilateral). 30 mL 11    DYMISTA 137-50 MCG/ACT Suspension Administer 2 Sprays into affected nostril(S) every day. 23 g 5    albuterol 108 (90 Base) MCG/ACT Aero Soln inhalation aerosol Inhale 2 Puffs every four hours as needed. 18 g 11    naproxen (NAPROSYN) 500 MG Tab TAKE 1 TABLET BY MOUTH TWICE DAILY WITH MEALS 60 Tablet 0    cyclobenzaprine (FLEXERIL) 10 mg Tab Take 1 Tablet by mouth 3 times a day as needed. 60 Tablet 5     No current facility-administered medications for this visit.     He  has a past medical history of Back pain, Central sleep apnea, Depression, and FLIP (obstructive sleep apnea).    He has no past medical history of ASTHMA, Diabetes, Seizure (HCC), or Ulcer.    ROS   No chest pain, no shortness of breath, no abdominal pain  Positive ROS as per HPI.  All other systems reviewed and are negative.     Objective:     /72 (BP Location: Left arm, Patient Position: Sitting, BP Cuff Size: Adult)   Pulse 93   Temp 36.1 °C (96.9 °F) (Temporal)   Ht 1.83 m (6' 0.05\")   Wt 99 kg (218 lb 3.2 oz)   SpO2 95%  Body mass index is 29.55 kg/m².   Physical Exam    Constitutional: Alert, no distress.  Skin: Warm, dry, good turgor, no rashes in visible areas.  Eye: Equal, round and reactive, conjunctiva clear, lids normal.  ENMT: Lips without lesions, good dentition, oropharynx " clear.  Neck: Trachea midline, no masses, no thyromegaly.  Psych: Alert and oriented x3, normal affect and mood.      Results  Laboratory Studies  Ferritin level is at 146.    Testing  PHQ-9 score is 10.        Assessment and Plan:   The following treatment plan was discussed    Assessment & Plan  1. Depression.  This is a chronic condition. He scored 10 on PHQ-9, indicating uncontrolled depression. He is currently taking medication and follows with psychiatry. No homicidal or suicidal ideations are reported. He is advised to continue his current medication regimen and follow up with psychiatry as planned.    2. Vertigo.  This is a chronic condition but intermittent. He experiences vertigo occasionally, especially when sitting down, but not daily. The condition will continue to be monitored.    3. Plantar fasciitis.  He reports ongoing pain. He has seen podiatry, which discussed surgery and injections, but he has not received any injections. He is advised that steroid injections may be beneficial and could potentially cure the condition long-term if combined with wearing supportive shoes, stretching, and taking ibuprofen. He will notify if a referral to podiatry is needed.    4. Skin lesions on the left arm.  He has painful skin lesions that have been present for more than three months. He is referred to dermatology for evaluation and removal of the lesions. Additionally, a referral for skin cancer screening is made.    5. Preventative healthcare.  Ordered labs including thyroid level, complete blood count, liver and kidney function tests, diabetes A1c, cholesterol, and PSA. He is instructed to fast for 8 hours before the tests and drink 4 ounces of water. He will be contacted with the results.    Follow-up  Return in 3 months for follow up.      ORDERS:  1. Recurrent major depressive disorder, in full remission (HCC)      2. Vertigo      3. Plantar fasciitis, bilateral      4. Skin lesion of left arm    - Referral  to Dermatology    5. Skin cancer screening    - Referral to Dermatology    6. Preventative health care    - PROSTATE SPECIFIC AG SCREENING; Future  - Lipid Profile; Future  - HEMOGLOBIN A1C; Future  - Comp Metabolic Panel; Future  - CBC WITH DIFFERENTIAL; Future  - TSH WITH REFLEX TO FT4; Future    7. Screening PSA (prostate specific antigen)    - PROSTATE SPECIFIC AG SCREENING; Future        Please note that this dictation was created using voice recognition software. I have made every reasonable attempt to correct obvious errors, but I expect that there are errors of grammar and possibly content that I did not discover before finalizing the note.      Attestation      Verbal consent was acquired by the patient to use [email or web address] ambient listening note generation during this visit Yes

## 2024-10-18 ENCOUNTER — HOSPITAL ENCOUNTER (OUTPATIENT)
Dept: HEMATOLOGY ONCOLOGY | Facility: MEDICAL CENTER | Age: 49
End: 2024-10-18
Attending: INTERNAL MEDICINE
Payer: OTHER GOVERNMENT

## 2024-10-18 VITALS
RESPIRATION RATE: 14 BRPM | SYSTOLIC BLOOD PRESSURE: 100 MMHG | WEIGHT: 218.26 LBS | DIASTOLIC BLOOD PRESSURE: 84 MMHG | HEART RATE: 54 BPM | BODY MASS INDEX: 29.56 KG/M2 | TEMPERATURE: 97.2 F | OXYGEN SATURATION: 95 % | HEIGHT: 72 IN

## 2024-10-18 DIAGNOSIS — D50.0 IRON DEFICIENCY ANEMIA DUE TO CHRONIC BLOOD LOSS: ICD-10-CM

## 2024-10-18 DIAGNOSIS — R79.89 ELEVATED FERRITIN: ICD-10-CM

## 2024-10-18 PROCEDURE — 99214 OFFICE O/P EST MOD 30 MIN: CPT | Performed by: INTERNAL MEDICINE

## 2024-10-18 PROCEDURE — 99212 OFFICE O/P EST SF 10 MIN: CPT | Performed by: INTERNAL MEDICINE

## 2024-10-18 ASSESSMENT — PAIN SCALES - GENERAL: PAINLEVEL: NO PAIN

## 2024-10-18 ASSESSMENT — FIBROSIS 4 INDEX: FIB4 SCORE: 0.63

## 2024-10-20 ENCOUNTER — APPOINTMENT (OUTPATIENT)
Dept: ONCOLOGY | Facility: MEDICAL CENTER | Age: 49
End: 2024-10-20
Attending: INTERNAL MEDICINE
Payer: OTHER GOVERNMENT

## 2024-10-23 ENCOUNTER — HOSPITAL ENCOUNTER (OUTPATIENT)
Dept: LAB | Facility: MEDICAL CENTER | Age: 49
End: 2024-10-23
Attending: PHYSICIAN ASSISTANT
Payer: OTHER GOVERNMENT

## 2024-10-23 DIAGNOSIS — Z00.00 PREVENTATIVE HEALTH CARE: ICD-10-CM

## 2024-10-23 DIAGNOSIS — Z12.5 SCREENING PSA (PROSTATE SPECIFIC ANTIGEN): ICD-10-CM

## 2024-10-23 LAB
ALBUMIN SERPL BCP-MCNC: 4.1 G/DL (ref 3.2–4.9)
ALBUMIN/GLOB SERPL: 1.4 G/DL
ALP SERPL-CCNC: 86 U/L (ref 30–99)
ALT SERPL-CCNC: 32 U/L (ref 2–50)
ANION GAP SERPL CALC-SCNC: 12 MMOL/L (ref 7–16)
AST SERPL-CCNC: 21 U/L (ref 12–45)
BASOPHILS # BLD AUTO: 0.9 % (ref 0–1.8)
BASOPHILS # BLD: 0.05 K/UL (ref 0–0.12)
BILIRUB SERPL-MCNC: 0.5 MG/DL (ref 0.1–1.5)
BUN SERPL-MCNC: 17 MG/DL (ref 8–22)
CALCIUM ALBUM COR SERPL-MCNC: 9.4 MG/DL (ref 8.5–10.5)
CALCIUM SERPL-MCNC: 9.5 MG/DL (ref 8.5–10.5)
CHLORIDE SERPL-SCNC: 104 MMOL/L (ref 96–112)
CHOLEST SERPL-MCNC: 171 MG/DL (ref 100–199)
CO2 SERPL-SCNC: 24 MMOL/L (ref 20–33)
CREAT SERPL-MCNC: 1.23 MG/DL (ref 0.5–1.4)
EOSINOPHIL # BLD AUTO: 0.19 K/UL (ref 0–0.51)
EOSINOPHIL NFR BLD: 3.3 % (ref 0–6.9)
ERYTHROCYTE [DISTWIDTH] IN BLOOD BY AUTOMATED COUNT: 37.2 FL (ref 35.9–50)
EST. AVERAGE GLUCOSE BLD GHB EST-MCNC: 128 MG/DL
GFR SERPLBLD CREATININE-BSD FMLA CKD-EPI: 71 ML/MIN/1.73 M 2
GLOBULIN SER CALC-MCNC: 3 G/DL (ref 1.9–3.5)
GLUCOSE SERPL-MCNC: 107 MG/DL (ref 65–99)
HBA1C MFR BLD: 6.1 % (ref 4–5.6)
HCT VFR BLD AUTO: 45.9 % (ref 42–52)
HDLC SERPL-MCNC: 43 MG/DL
HGB BLD-MCNC: 14.9 G/DL (ref 14–18)
IMM GRANULOCYTES # BLD AUTO: 0.01 K/UL (ref 0–0.11)
IMM GRANULOCYTES NFR BLD AUTO: 0.2 % (ref 0–0.9)
LDLC SERPL CALC-MCNC: 106 MG/DL
LYMPHOCYTES # BLD AUTO: 1.87 K/UL (ref 1–4.8)
LYMPHOCYTES NFR BLD: 32.2 % (ref 22–41)
MCH RBC QN AUTO: 24.5 PG (ref 27–33)
MCHC RBC AUTO-ENTMCNC: 32.5 G/DL (ref 32.3–36.5)
MCV RBC AUTO: 75.6 FL (ref 81.4–97.8)
MONOCYTES # BLD AUTO: 0.67 K/UL (ref 0–0.85)
MONOCYTES NFR BLD AUTO: 11.5 % (ref 0–13.4)
NEUTROPHILS # BLD AUTO: 3.02 K/UL (ref 1.82–7.42)
NEUTROPHILS NFR BLD: 51.9 % (ref 44–72)
NRBC # BLD AUTO: 0 K/UL
NRBC BLD-RTO: 0 /100 WBC (ref 0–0.2)
PLATELET # BLD AUTO: 392 K/UL (ref 164–446)
PMV BLD AUTO: 9.3 FL (ref 9–12.9)
POTASSIUM SERPL-SCNC: 4.5 MMOL/L (ref 3.6–5.5)
PROT SERPL-MCNC: 7.1 G/DL (ref 6–8.2)
PSA SERPL-MCNC: 0.43 NG/ML (ref 0–4)
RBC # BLD AUTO: 6.07 M/UL (ref 4.7–6.1)
SODIUM SERPL-SCNC: 140 MMOL/L (ref 135–145)
TRIGL SERPL-MCNC: 111 MG/DL (ref 0–149)
TSH SERPL DL<=0.005 MIU/L-ACNC: 2.76 UIU/ML (ref 0.38–5.33)
WBC # BLD AUTO: 5.8 K/UL (ref 4.8–10.8)

## 2024-10-23 PROCEDURE — 84443 ASSAY THYROID STIM HORMONE: CPT

## 2024-10-23 PROCEDURE — 85025 COMPLETE CBC W/AUTO DIFF WBC: CPT

## 2024-10-23 PROCEDURE — 84153 ASSAY OF PSA TOTAL: CPT

## 2024-10-23 PROCEDURE — 83036 HEMOGLOBIN GLYCOSYLATED A1C: CPT

## 2024-10-23 PROCEDURE — 80061 LIPID PANEL: CPT

## 2024-10-23 PROCEDURE — 36415 COLL VENOUS BLD VENIPUNCTURE: CPT

## 2024-10-23 PROCEDURE — 80053 COMPREHEN METABOLIC PANEL: CPT

## 2024-11-02 ENCOUNTER — HOSPITAL ENCOUNTER (OUTPATIENT)
Facility: MEDICAL CENTER | Age: 49
End: 2024-11-02
Payer: OTHER GOVERNMENT

## 2024-11-02 ENCOUNTER — OFFICE VISIT (OUTPATIENT)
Dept: URGENT CARE | Facility: PHYSICIAN GROUP | Age: 49
End: 2024-11-02
Payer: OTHER GOVERNMENT

## 2024-11-02 VITALS
RESPIRATION RATE: 16 BRPM | HEIGHT: 73 IN | BODY MASS INDEX: 27.83 KG/M2 | WEIGHT: 210 LBS | SYSTOLIC BLOOD PRESSURE: 110 MMHG | OXYGEN SATURATION: 94 % | TEMPERATURE: 97.9 F | DIASTOLIC BLOOD PRESSURE: 80 MMHG | HEART RATE: 120 BPM

## 2024-11-02 DIAGNOSIS — N30.01 ACUTE CYSTITIS WITH HEMATURIA: ICD-10-CM

## 2024-11-02 LAB
APPEARANCE UR: CLEAR
BILIRUB UR STRIP-MCNC: NORMAL MG/DL
COLOR UR AUTO: YELLOW
GLUCOSE UR STRIP.AUTO-MCNC: NORMAL MG/DL
KETONES UR STRIP.AUTO-MCNC: NORMAL MG/DL
LEUKOCYTE ESTERASE UR QL STRIP.AUTO: NORMAL
NITRITE UR QL STRIP.AUTO: NORMAL
PH UR STRIP.AUTO: 6.5 [PH] (ref 5–8)
PROT UR QL STRIP: NORMAL MG/DL
RBC UR QL AUTO: NORMAL
SP GR UR STRIP.AUTO: 1
UROBILINOGEN UR STRIP-MCNC: 0.2 MG/DL

## 2024-11-02 PROCEDURE — 3074F SYST BP LT 130 MM HG: CPT

## 2024-11-02 PROCEDURE — 99213 OFFICE O/P EST LOW 20 MIN: CPT

## 2024-11-02 PROCEDURE — 87077 CULTURE AEROBIC IDENTIFY: CPT

## 2024-11-02 PROCEDURE — 81002 URINALYSIS NONAUTO W/O SCOPE: CPT

## 2024-11-02 PROCEDURE — 87186 SC STD MICRODIL/AGAR DIL: CPT

## 2024-11-02 PROCEDURE — 87086 URINE CULTURE/COLONY COUNT: CPT

## 2024-11-02 PROCEDURE — 1126F AMNT PAIN NOTED NONE PRSNT: CPT

## 2024-11-02 PROCEDURE — 3079F DIAST BP 80-89 MM HG: CPT

## 2024-11-02 RX ORDER — SULFAMETHOXAZOLE AND TRIMETHOPRIM 800; 160 MG/1; MG/1
1 TABLET ORAL 2 TIMES DAILY
Qty: 14 TABLET | Refills: 0 | Status: SHIPPED | OUTPATIENT
Start: 2024-11-02 | End: 2024-11-09

## 2024-11-02 ASSESSMENT — ENCOUNTER SYMPTOMS
MYALGIAS: 1
FEVER: 1
CHILLS: 1

## 2024-11-02 ASSESSMENT — FIBROSIS 4 INDEX: FIB4 SCORE: 0.47

## 2024-11-02 ASSESSMENT — PAIN SCALES - GENERAL: PAINLEVEL: NO PAIN

## 2024-11-02 NOTE — PROGRESS NOTES
Verbal consent was acquired by the patient to use [employer] ambient listening note generation during this visit   Subjective:   Mike Rios is a 49 y.o. male who presents for Fever (Chills , body aches,fatigue, weakness   started last night/Urine frequency  )      HPI:  History of Present Illness  The patient is a 49-year-old male who presents for evaluation of frequent urination.    He reports experiencing frequent urination throughout the previous night and this morning, accompanied by fatigue, weakness, fever, and body aches. These symptoms began last night. He describes the urination as irritating. He has no history of urinary tract infections. His fever peaked at 99 degrees. He has taken NyQuil for symptom relief. He has no history of diabetes and denies any changes in vision, although he does report increased thirst.       Review of Systems   Constitutional:  Positive for chills and fever.   Genitourinary:  Positive for dysuria and frequency.   Musculoskeletal:  Positive for myalgias.       Medications:    Current Outpatient Medications on File Prior to Visit   Medication Sig Dispense Refill    zolpidem (AMBIEN) 10 MG Tab Take 10 mg by mouth at bedtime as needed for Sleep.      LORazepam (ATIVAN) 0.5 MG Tab       atorvastatin (LIPITOR) 40 MG Tab TAKE 1 TABLET BY MOUTH EVERY DAY 90 Tablet 3    sumatriptan (IMITREX) 100 MG tablet TAKE 1 TABLET BY MOUTH 1 TIME AS NEEDED FOR MIGRAINE. MAY TAKE SECOND TABLET 2 HOURS AFTER IF STILL AT ONSET OF HEADACHE 9 Tablet 11    buPROPion (WELLBUTRIN XL) 300 MG XL tablet Take 1 Tablet by mouth every morning. 90 Tablet 3    sertraline (ZOLOFT) 100 MG Tab Take 2 Tablets by mouth every day. 180 Tablet 2    propranolol (INDERAL) 20 MG Tab TAKE 1 TABLET BY MOUTH TWICE DAILY AS NEEDED FOR ANXIETY Strength: 20 mg 180 Tablet 2    prazosin (MINIPRESS) 2 MG Cap Take 2 Capsules by mouth every evening. 180 Capsule 2    sildenafil citrate (VIAGRA) 100 MG tablet TAKE 1 TABLET BY  MOUTH EVERY DAY 30 MINUTES BEFORE ACTIVITY 9 Tablet 11    omeprazole (PRILOSEC) 20 MG delayed-release capsule TAKE 1 CAPSULE BY MOUTH TWICE DAILY 30 MINUTES PRIOR TO SAME MEAL 180 Capsule 2    finasteride (PROPECIA) 1 MG tablet Take 1 Tablet by mouth every day. 90 Tablet 2    finasteride (PROSCAR) 5 MG Tab TAKE 1 TABLET BY MOUTH EVERY DAY 90 Tablet 3    indomethacin (INDOCIN) 25 MG Cap TAKE 1 CAPSULE BY MOUTH THREE TIMES DAILY FOR 3 DAYS THEN 2 CAPSULES THREE TIMES DAILY FOR 3 DAYS THEN 3 CAPSULES THREE TIMES DAILY FOR 3 DAYS 54 Capsule 0    Hypromell-Glycerin-Naphazoline (CLEAR EYES FOR DRY EYES PLUS) 0.8-0.25-0.012 % Solution Administer 1 Drop into affected eye(s) every four hours as needed (Bilateral). 30 mL 11    DYMISTA 137-50 MCG/ACT Suspension Administer 2 Sprays into affected nostril(S) every day. 23 g 5    albuterol 108 (90 Base) MCG/ACT Aero Soln inhalation aerosol Inhale 2 Puffs every four hours as needed. 18 g 11    naproxen (NAPROSYN) 500 MG Tab TAKE 1 TABLET BY MOUTH TWICE DAILY WITH MEALS 60 Tablet 0    cyclobenzaprine (FLEXERIL) 10 mg Tab Take 1 Tablet by mouth 3 times a day as needed. 60 Tablet 5     No current facility-administered medications on file prior to visit.        Allergies:   Pcn [penicillins]    Problem List:   Patient Active Problem List   Diagnosis    Recurrent major depressive disorder, in full remission (HCC)    Obstructive sleep apnea    Gastroesophageal reflux disease    LAKHWINDER (generalized anxiety disorder)    Other insomnia    Patellofemoral pain syndrome of both knees    Chronic left-sided low back pain with left-sided sciatica    Chronic left shoulder pain    Chronic non-seasonal allergic rhinitis    Intractable migraine with aura with status migrainosus    Drug-induced erectile dysfunction    Allergic reaction    Hair loss    Plantar fasciitis, bilateral    Vertigo    Mixed hyperlipidemia    TMJ (dislocation of temporomandibular joint), initial encounter    Chronic right  "shoulder pain    Pain in both lower legs    Carpal tunnel syndrome, bilateral    Chronic post-traumatic stress disorder (PTSD)    Bilateral dry eyes    Tinnitus of both ears    Lateral epicondylitis of both elbows    Bilateral bunions    Panic attacks    Chronic prescription benzodiazepine use    Neuropathy of left upper extremity    Microcytosis    Agatston coronary artery calcium score less than 100    Thumb pain, right    Elevated ferritin    Hereditary hemochromatosis (HCC)    Skin lesion of left arm        Surgical History:  Past Surgical History:   Procedure Laterality Date    HERNIA REPAIR         Past Social Hx:   Social History     Tobacco Use    Smoking status: Never    Smokeless tobacco: Never   Vaping Use    Vaping status: Never Used   Substance Use Topics    Alcohol use: Yes     Alcohol/week: 0.0 oz     Comment: Rare    Drug use: No          Problem list, medications, and allergies reviewed by myself today in Epic.     Objective:     /80 (BP Location: Right arm, Patient Position: Sitting, BP Cuff Size: Adult)   Pulse (!) 120   Temp 36.6 °C (97.9 °F) (Temporal)   Resp 16   Ht 1.854 m (6' 1\")   Wt 95.3 kg (210 lb)   SpO2 94%   BMI 27.71 kg/m²     Physical Exam  Vitals and nursing note reviewed.   Constitutional:       General: He is not in acute distress.     Appearance: Normal appearance. He is normal weight. He is not ill-appearing, toxic-appearing or diaphoretic.   HENT:      Head: Normocephalic and atraumatic.      Nose: Nose normal.      Mouth/Throat:      Mouth: Mucous membranes are moist.      Pharynx: Oropharynx is clear. No oropharyngeal exudate or posterior oropharyngeal erythema.   Cardiovascular:      Rate and Rhythm: Regular rhythm. Tachycardia present.      Pulses: Normal pulses.      Heart sounds: Normal heart sounds. No murmur heard.     No friction rub. No gallop.   Pulmonary:      Effort: Pulmonary effort is normal. No respiratory distress.      Breath sounds: Normal breath " sounds. No stridor. No wheezing, rhonchi or rales.   Chest:      Chest wall: No tenderness.   Abdominal:      Tenderness: There is no right CVA tenderness or left CVA tenderness.   Skin:     General: Skin is warm and dry.      Capillary Refill: Capillary refill takes less than 2 seconds.   Neurological:      General: No focal deficit present.      Mental Status: He is alert and oriented to person, place, and time. Mental status is at baseline.      Gait: Gait normal.   Psychiatric:         Mood and Affect: Mood normal.         Behavior: Behavior normal.         Thought Content: Thought content normal.         Judgment: Judgment normal.         Assessment/Plan:     Diagnosis and associated orders:   1. Acute cystitis with hematuria  - sulfamethoxazole-trimethoprim (BACTRIM DS) 800-160 MG tablet; Take 1 Tablet by mouth 2 times a day for 7 days.  Dispense: 14 Tablet; Refill: 0  - URINE CULTURE(NEW); Future  - POCT Urinalysis         Comments/MDM:   Pt is clinically stable at today's acute urgent care visit.  No acute distress noted. Appropriate for outpatient management at this time.     Assessment & Plan  The patient presents today for frequent urination, fevers, chills, body aches, and pain with urination.  POC UA is positive for blood and leukocytes.  There is high suspicion for acute cystitis.  The patient will be prescribed a course of Bactrim for 7 days.  Urine will be sent for culture, will follow.  Advised patient begin taking the antibiotic as prescribed and stay well-hydrated. Patient is to return to  or go to ER for any new or worsening signs or symptoms, and follow with with PCP for recheck. Patient is agreeable with plan of care and verbalizes good understanding.             Discussed DDx, management options (risks,benefits, and alternatives to planned treatment), natural progression and supportive care.  Expressed understanding and the treatment plan was agreed upon. Questions were encouraged and  answered   Return to urgent care prn if new or worsening sx or if there is no improvement in condition prn.    Educated in Red flags and indications to immediately call 911 or present to the Emergency Department.   Advised the patient to follow-up with the primary care physician for recheck, reevaluation, and consideration of further management.    I personally reviewed prior external notes and test results pertinent to today's visit.  I have independently reviewed and interpreted all diagnostics ordered during this urgent care acute visit.       Please note that this dictation was created using voice recognition software. I have made a reasonable attempt to correct obvious errors, but I expect that there are errors of grammar and possibly content that I did not discover before finalizing the note.    This note was electronically signed by MAJO Wilcox

## 2024-11-04 LAB
BACTERIA UR CULT: ABNORMAL
BACTERIA UR CULT: ABNORMAL
SIGNIFICANT IND 70042: ABNORMAL
SITE SITE: ABNORMAL
SOURCE SOURCE: ABNORMAL

## 2024-11-12 ENCOUNTER — PATIENT MESSAGE (OUTPATIENT)
Dept: MEDICAL GROUP | Facility: MEDICAL CENTER | Age: 49
End: 2024-11-12
Payer: OTHER GOVERNMENT

## 2024-11-13 ENCOUNTER — TELEMEDICINE (OUTPATIENT)
Dept: MEDICAL GROUP | Facility: MEDICAL CENTER | Age: 49
End: 2024-11-13
Payer: OTHER GOVERNMENT

## 2024-11-13 VITALS — HEIGHT: 73 IN | BODY MASS INDEX: 27.83 KG/M2 | WEIGHT: 210 LBS

## 2024-11-13 DIAGNOSIS — F33.2 SEVERE EPISODE OF RECURRENT MAJOR DEPRESSIVE DISORDER, WITHOUT PSYCHOTIC FEATURES (HCC): ICD-10-CM

## 2024-11-13 DIAGNOSIS — F41.1 GAD (GENERALIZED ANXIETY DISORDER): ICD-10-CM

## 2024-11-13 DIAGNOSIS — R35.1 NOCTURIA: ICD-10-CM

## 2024-11-13 DIAGNOSIS — R42 VERTIGO: ICD-10-CM

## 2024-11-13 DIAGNOSIS — F43.12 CHRONIC POST-TRAUMATIC STRESS DISORDER (PTSD): ICD-10-CM

## 2024-11-13 DIAGNOSIS — M62.838 TRAPEZIUS MUSCLE SPASM: ICD-10-CM

## 2024-11-13 DIAGNOSIS — F33.42 RECURRENT MAJOR DEPRESSIVE DISORDER, IN FULL REMISSION (HCC): ICD-10-CM

## 2024-11-13 DIAGNOSIS — L65.9 HAIR LOSS: ICD-10-CM

## 2024-11-13 PROCEDURE — 99214 OFFICE O/P EST MOD 30 MIN: CPT | Mod: 95 | Performed by: PHYSICIAN ASSISTANT

## 2024-11-13 RX ORDER — MECLIZINE HYDROCHLORIDE 25 MG/1
25 TABLET ORAL 3 TIMES DAILY PRN
COMMUNITY
Start: 2024-10-30 | End: 2024-11-13 | Stop reason: SDUPTHER

## 2024-11-13 RX ORDER — SERTRALINE HYDROCHLORIDE 100 MG/1
200 TABLET, FILM COATED ORAL DAILY
Qty: 180 TABLET | Refills: 2 | Status: SHIPPED | OUTPATIENT
Start: 2024-11-13

## 2024-11-13 RX ORDER — CYCLOBENZAPRINE HCL 10 MG
10 TABLET ORAL 3 TIMES DAILY PRN
Qty: 60 TABLET | Refills: 5 | Status: SHIPPED | OUTPATIENT
Start: 2024-11-13

## 2024-11-13 RX ORDER — MECLIZINE HYDROCHLORIDE 25 MG/1
25 TABLET ORAL
Qty: 90 TABLET | Refills: 3 | Status: SHIPPED | OUTPATIENT
Start: 2024-11-13 | End: 2025-02-11

## 2024-11-13 RX ORDER — FINASTERIDE 5 MG/1
5 TABLET, FILM COATED ORAL DAILY
Qty: 90 TABLET | Refills: 3 | Status: SHIPPED | OUTPATIENT
Start: 2024-11-13 | End: 2025-11-08

## 2024-11-13 RX ORDER — PROPRANOLOL HCL 20 MG
TABLET ORAL
Qty: 180 TABLET | Refills: 2 | Status: SHIPPED | OUTPATIENT
Start: 2024-11-13

## 2024-11-13 RX ORDER — BUPROPION HYDROCHLORIDE 300 MG/1
300 TABLET ORAL EVERY MORNING
Qty: 90 TABLET | Refills: 3 | Status: SHIPPED | OUTPATIENT
Start: 2024-11-13

## 2024-11-13 RX ORDER — PRAZOSIN HYDROCHLORIDE 2 MG/1
4 CAPSULE ORAL NIGHTLY
Qty: 180 CAPSULE | Refills: 2 | Status: SHIPPED | OUTPATIENT
Start: 2024-11-13

## 2024-11-13 ASSESSMENT — FIBROSIS 4 INDEX: FIB4 SCORE: 0.47

## 2024-11-13 NOTE — PROGRESS NOTES
Subjective:     History of Present Illness  The patient presents via virtual visit for evaluation of multiple medical concerns.    This evaluation was conducted via Teams using secure and encrypted videoconferencing technology. The patient was in their home in the [facility].    The patient's identity was confirmed and verbal consent was obtained for this virtual visit.      He has been working remotely for the past 3 years and is seeking to have this arrangement officially documented in his file.    He is requesting a refill of his meclizine 25 mg medication, which he takes when he plans to drive or go out. Additionally, he is requesting a refill of his finasteride 5 mg medication, which he takes daily for hair loss.    He reports that his cholesterol levels are within the normal range.    He recently experienced a urinary tract infection (UTI) caused by E. coli, which was successfully treated with Bactrim. He believes the UTI may have been due to not urinating after intercourse and holding his urine at night.    He also reports experiencing back pain but cannot recall the name of the medication he was previously prescribed for this issue.    He recently had two benign dermatofibromas removed and was informed that no further follow-up was necessary.      Current medicines (including changes today)  Current Outpatient Medications   Medication Sig Dispense Refill    meclizine (ANTIVERT) 25 MG Tab Take 1 Tablet by mouth 1 time a day as needed for Vertigo for up to 90 days. 90 Tablet 3    finasteride (PROSCAR) 5 MG Tab Take 1 Tablet by mouth every day for 360 days. 90 Tablet 3    buPROPion (WELLBUTRIN XL) 300 MG XL tablet Take 1 Tablet by mouth every morning. 90 Tablet 3    cyclobenzaprine (FLEXERIL) 10 mg Tab Take 1 Tablet by mouth 3 times a day as needed for Muscle Spasms. 60 Tablet 5    prazosin (MINIPRESS) 2 MG Cap TAKE 2 CAPSULES BY MOUTH EVERY EVENING 180 Capsule 2    propranolol (INDERAL) 20 MG Tab TAKE 1  TABLET BY MOUTH TWICE DAILY AS NEEDED FOR ANXIETY 180 Tablet 2    sertraline (ZOLOFT) 100 MG Tab TAKE 2 TABLETS BY MOUTH EVERY  Tablet 2    zolpidem (AMBIEN) 10 MG Tab Take 10 mg by mouth at bedtime as needed for Sleep.      LORazepam (ATIVAN) 0.5 MG Tab       atorvastatin (LIPITOR) 40 MG Tab TAKE 1 TABLET BY MOUTH EVERY DAY 90 Tablet 3    sumatriptan (IMITREX) 100 MG tablet TAKE 1 TABLET BY MOUTH 1 TIME AS NEEDED FOR MIGRAINE. MAY TAKE SECOND TABLET 2 HOURS AFTER IF STILL AT ONSET OF HEADACHE 9 Tablet 11    sildenafil citrate (VIAGRA) 100 MG tablet TAKE 1 TABLET BY MOUTH EVERY DAY 30 MINUTES BEFORE ACTIVITY 9 Tablet 11    omeprazole (PRILOSEC) 20 MG delayed-release capsule TAKE 1 CAPSULE BY MOUTH TWICE DAILY 30 MINUTES PRIOR TO SAME MEAL 180 Capsule 2    finasteride (PROSCAR) 5 MG Tab TAKE 1 TABLET BY MOUTH EVERY DAY 90 Tablet 3    indomethacin (INDOCIN) 25 MG Cap TAKE 1 CAPSULE BY MOUTH THREE TIMES DAILY FOR 3 DAYS THEN 2 CAPSULES THREE TIMES DAILY FOR 3 DAYS THEN 3 CAPSULES THREE TIMES DAILY FOR 3 DAYS 54 Capsule 0    Hypromell-Glycerin-Naphazoline (CLEAR EYES FOR DRY EYES PLUS) 0.8-0.25-0.012 % Solution Administer 1 Drop into affected eye(s) every four hours as needed (Bilateral). 30 mL 11    DYMISTA 137-50 MCG/ACT Suspension Administer 2 Sprays into affected nostril(S) every day. 23 g 5    albuterol 108 (90 Base) MCG/ACT Aero Soln inhalation aerosol Inhale 2 Puffs every four hours as needed. 18 g 11    naproxen (NAPROSYN) 500 MG Tab TAKE 1 TABLET BY MOUTH TWICE DAILY WITH MEALS 60 Tablet 0     No current facility-administered medications for this visit.     He  has a past medical history of Back pain, Central sleep apnea, Depression, and FLIP (obstructive sleep apnea).    He has no past medical history of ASTHMA, Diabetes, Seizure (HCC), or Ulcer.    ROS   No chest pain, no shortness of breath, no abdominal pain  Positive ROS as per HPI.  All other systems reviewed and are negative.     Objective:     Tabatha  "1.854 m (6' 1\")   Wt 95.3 kg (210 lb)  Body mass index is 27.71 kg/m².   Physical Exam    Constitutional: Alert, no distress.  Skin: Warm, dry, good turgor, no rashes in visible areas.  Eye: Equal, round and reactive, conjunctiva clear, lids normal.  ENMT: Lips without lesions, good dentition, oropharynx clear.  Psych: Alert and oriented x3, normal affect and mood.      Results          Assessment and Plan:   The following treatment plan was discussed    Assessment & Plan  1. Medication Management.  A 90-day supply of meclizine 25 mg with 3 refills will be provided. A 90-day supply of finasteride 5 mg will be provided. A 90-day supply of Wellbutrin 300 mg with 3 refills will be provided. A prescription for Flexeril 10 mg will be sent to the pharmacy.     2.  Status post urinary Tract Infection.  The patient had a UTI caused by E. coli and was treated with Bactrim, which resolved the symptoms. He is advised to avoid holding urine and to urinate after intercourse to prevent future infections.    3. Form completion encounter  Form was completed for him to continue to work from home.  He has several chronic medical conditions and the 1 which affects him's is most is likely depression anxiety which is well-controlled all he is able to work from home.  The form will be uploaded on [employer] for him to provide to his employer.        ORDERS:  1. Hair loss    - finasteride (PROSCAR) 5 MG Tab; Take 1 Tablet by mouth every day for 360 days.  Dispense: 90 Tablet; Refill: 3    2. Vertigo    - meclizine (ANTIVERT) 25 MG Tab; Take 1 Tablet by mouth 1 time a day as needed for Vertigo for up to 90 days.  Dispense: 90 Tablet; Refill: 3    3. Recurrent major depressive disorder, in full remission (HCC)    - buPROPion (WELLBUTRIN XL) 300 MG XL tablet; Take 1 Tablet by mouth every morning.  Dispense: 90 Tablet; Refill: 3    4. Trapezius muscle spasm    - cyclobenzaprine (FLEXERIL) 10 mg Tab; Take 1 Tablet by mouth 3 times a day as " needed for Muscle Spasms.  Dispense: 60 Tablet; Refill: 5        Please note that this dictation was created using voice recognition software. I have made every reasonable attempt to correct obvious errors, but I expect that there are errors of grammar and possibly content that I did not discover before finalizing the note.      Attestation      Verbal consent was acquired by the patient to use [employer] ambient listening note generation during this visit Yes

## 2025-01-26 DIAGNOSIS — K21.9 GASTROESOPHAGEAL REFLUX DISEASE: ICD-10-CM

## 2025-02-06 ENCOUNTER — TELEMEDICINE (OUTPATIENT)
Dept: BEHAVIORAL HEALTH | Facility: CLINIC | Age: 50
End: 2025-02-06
Payer: OTHER GOVERNMENT

## 2025-02-06 DIAGNOSIS — F41.1 GAD (GENERALIZED ANXIETY DISORDER): ICD-10-CM

## 2025-02-06 DIAGNOSIS — F33.42 RECURRENT MAJOR DEPRESSIVE DISORDER, IN FULL REMISSION (HCC): ICD-10-CM

## 2025-02-06 DIAGNOSIS — F43.12 CHRONIC POST-TRAUMATIC STRESS DISORDER (PTSD): ICD-10-CM

## 2025-02-06 PROCEDURE — 99214 OFFICE O/P EST MOD 30 MIN: CPT | Performed by: PSYCHIATRY & NEUROLOGY

## 2025-02-06 NOTE — LETTER
February 6, 2025    To Whom It May Concern:     I am writing this letter as the treating psychiatrist for Mike Rios, who has been under my care for the management of Major Depressive Disorder, Generalized Anxiety Disorder & PTSD.     He is following with me for the management of the following symptoms:   Severe anxiety, persistent worry, and fear of job loss.   Chronic insomnia, racing thoughts, and inability to focus.   Emotional exhaustion, stress intolerance, and depressive episodes.   Difficulty handling in-person interactions, high-stress environments, and workplace pressure.     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 Due to the severity of his condition, Mr. Rios was previously granted a Reasonable Accommodation (RA) for remote work, which has allowed him to effectively perform his job duties while managing his mental health.     If this RA is revoked, or if Mr. Rios is required to return to an in-person work setting, it can result in destabilization of his underlying mental health condition.   Loss of structured support that allows him to manage his condition.   Severe anxiety due to job loss and inability to find a comparable position with the necessary accommodations.   Inability to perform job duties without the necessary remote work accommodation    As his treating psychiatrist, I will recommend that Mr. Rios continue working in a remote environment to prevent further deterioration of his mental health condition.    Without remote work, Mr. Rios is unable to maintain job performance in a standard office environment. If no comparable federal position with similar accommodations is available, he should be considered for FERS Disability nursing home.      Sincerely  Paul Lin M.D.

## 2025-02-06 NOTE — PROGRESS NOTES
This evaluation was conducted via Teams using secure and encrypted videoconferencing technology. The patient was in their home in the [facility].    The patient's identity was confirmed and verbal consent was obtained for this virtual visit.      PSYCHIATRY FOLLOW-UP NOTE      Name: Mike Rios  MRN: [id number]  : 1975  Age: 49 y.o.  Date of assessment: 2025  PCP: Aamir Shepherd P.A.-C.  Persons in attendance: Patient      REASON FOR VISIT/CHIEF COMPLAINT (as stated by Patient):  Mike Rios is a 49 y.o.,   White male, attending follow-up appointment for mood and anxiety management.      HISTORY OF PRESENT ILLNESS:  Mike Rios is a 49 y.o. old male with MDD, LAKHWINDER, PTSD and panic attacks comes in today for follow up. Patient was last seen 6 months ago, and following treatment planning recommendations were done:  Continue Zoloft 200 mg daily for depression, anxiety and PTSD management.  Continue Wellbutrin  mg daily for depression augmentation.  Continue Prazosin 4 mg at bedtime for PTSD nightmares management.  Continue Propanolol 20 mg BID PRN for morning anxiety. Do not use this medication within 6 hours of prazosin use.  Continue Ambien 10 mg HS PRN for sleep.    Continue Ativan 0.5 mg HS PRN for severe anxiety or panic attacks only.  Controlled medication treatment agreement sent to signed in 2024.  Patient currently not interested in psychotherapy.    History of Present Illness    He reports experiencing heightened anxiety due to the recent executive order to get him to work and canceling the work from home situation. This anxiety has disrupted his sleep pattern, leading to insomnia. He expresses concern about potential job loss and the possibility of his reasonable accommodation being revoked. He notes that his colleagues are also experiencing similar stressors.   He finds propranolol particularly effective in managing his anxiety, but alternates between  this and lorazepam due to the latter's sedative effects.   He is not experiencing any adverse effects from these medications.   Agreed with plan of keeping medications at same dose and getting a letter stating how him working from home will be beneficial for his mental health stability.        CURRENT MEDICATIONS:  Current Outpatient Medications   Medication Sig Dispense Refill    omeprazole (PRILOSEC) 20 MG delayed-release capsule TAKE ONE CAPSULE BY MOUTH TWICE DAILY 30 MINUTES PRIOR TO SAME MEAL 180 Capsule 2    prazosin (MINIPRESS) 2 MG Cap TAKE 2 CAPSULES BY MOUTH EVERY EVENING 180 Capsule 2    propranolol (INDERAL) 20 MG Tab TAKE 1 TABLET BY MOUTH TWICE DAILY AS NEEDED FOR ANXIETY 180 Tablet 2    sertraline (ZOLOFT) 100 MG Tab TAKE 2 TABLETS BY MOUTH EVERY  Tablet 2    meclizine (ANTIVERT) 25 MG Tab Take 1 Tablet by mouth 1 time a day as needed for Vertigo for up to 90 days. 90 Tablet 3    finasteride (PROSCAR) 5 MG Tab Take 1 Tablet by mouth every day for 360 days. 90 Tablet 3    buPROPion (WELLBUTRIN XL) 300 MG XL tablet Take 1 Tablet by mouth every morning. 90 Tablet 3    cyclobenzaprine (FLEXERIL) 10 mg Tab Take 1 Tablet by mouth 3 times a day as needed for Muscle Spasms. 60 Tablet 5    zolpidem (AMBIEN) 10 MG Tab Take 10 mg by mouth at bedtime as needed for Sleep.      LORazepam (ATIVAN) 0.5 MG Tab       atorvastatin (LIPITOR) 40 MG Tab TAKE 1 TABLET BY MOUTH EVERY DAY 90 Tablet 3    sumatriptan (IMITREX) 100 MG tablet TAKE 1 TABLET BY MOUTH 1 TIME AS NEEDED FOR MIGRAINE. MAY TAKE SECOND TABLET 2 HOURS AFTER IF STILL AT ONSET OF HEADACHE 9 Tablet 11    sildenafil citrate (VIAGRA) 100 MG tablet TAKE 1 TABLET BY MOUTH EVERY DAY 30 MINUTES BEFORE ACTIVITY 9 Tablet 11    finasteride (PROSCAR) 5 MG Tab TAKE 1 TABLET BY MOUTH EVERY DAY 90 Tablet 3    indomethacin (INDOCIN) 25 MG Cap TAKE 1 CAPSULE BY MOUTH THREE TIMES DAILY FOR 3 DAYS THEN 2 CAPSULES THREE TIMES DAILY FOR 3 DAYS THEN 3 CAPSULES THREE  TIMES DAILY FOR 3 DAYS 54 Capsule 0    Hypromell-Glycerin-Naphazoline (CLEAR EYES FOR DRY EYES PLUS) 0.8-0.25-0.012 % Solution Administer 1 Drop into affected eye(s) every four hours as needed (Bilateral). 30 mL 11    DYMISTA 137-50 MCG/ACT Suspension Administer 2 Sprays into affected nostril(S) every day. 23 g 5    albuterol 108 (90 Base) MCG/ACT Aero Soln inhalation aerosol Inhale 2 Puffs every four hours as needed. 18 g 11    naproxen (NAPROSYN) 500 MG Tab TAKE 1 TABLET BY MOUTH TWICE DAILY WITH MEALS 60 Tablet 0     No current facility-administered medications for this visit.       MEDICAL HISTORY  Past Medical History:   Diagnosis Date    Back pain     Central sleep apnea     Depression     FLIP (obstructive sleep apnea)      Past Surgical History:   Procedure Laterality Date    HERNIA REPAIR           PAST PSYCHIATRIC MEDICATIONS  Sertraline  Bupropion  Prazosin  Quetiapine  Ativan  Ambien      REVIEW OF SYSTEMS:        Constitutional negative   Eyes negative   Ears/Nose/Mouth/Throat  Sleep Apnea on CPAP   Cardiovascular  Hyperlipidemia   Respiratory negative   Gastrointestinal  GERD   Genitourinary negative   Muscular negative   Integumentary negative   Neurological  migraines   Endocrine negative   Hematologic/Lymphatic negative     PHYSICAL EXAMINAION:  Vital signs: There were no vitals taken for this visit.  Musculoskeletal: Normal gait.   Abnormal movements: none      MENTAL STATUS EXAMINATION      General:   - Grooming and hygiene: Casual,   - Apparent distress: tense,   - Behavior: Calm  - Eye Contact:  Good,   - no psychomotor agitation or retardation    - Participation: Active verbal participation  Orientation: Alert and Fully Oriented to person, place and time  Mood: Anxious  Affect: Flexible and Full range,  Thought Process: Logical and Goal-directed  Thought Content: Denies suicidal or homicidal ideations, intent or plan   Perception: Denies auditory or visual hallucinations. No delusions noted    Attention span and concentration: Intact   Speech:Rate within normal limits and Volume within normal limits  Language: Appropriate   Insight: Good  Judgment: Good  Recent and remote memory: No gross evidence of memory deficits        DEPRESSION SCREENIN/12/2023     1:36 PM 3/15/2024    10:20 AM 2024    11:24 AM   Depression Screen (PHQ-2/PHQ-9)   PHQ-2 Total Score 2  2   PHQ-2 Total Score  0    PHQ-9 Total Score 15  10       Interpretation of PHQ-9 Total Score   Score Severity   1-4 No Depression   5-9 Mild Depression   10-14 Moderate Depression   15-19 Moderately Severe Depression   20-27 Severe Depression    CURRENT RISK:       Suicidal: Low       Homicidal: Low       Self-Harm: Low       Relapse: Low       Crisis Safety Plan Reviewed Not Indicated       If evidence of imminent risk is present, intervention/plan:      MEDICAL RECORDS/LABS/DIAGNOSTIC TESTS REVIEWED:  No new lab since last visit     [place]  records -   Reviewed     PLAN:  (1) MDD; (2) LAKHWINDER; (3) PTSD; (4) Panic attacks   Recent increase in anxiety (due to executive order of returning to work)  Continue Zoloft 200 mg daily for depression, anxiety and PTSD management.  Continue Wellbutrin  mg daily for depression augmentation.  Continue Prazosin 4 mg at bedtime for PTSD nightmares management.  Continue Propanolol 20 mg BID PRN for morning anxiety. Do not use this medication within 6 hours of prazosin use.  Continue Ambien 10 mg HS PRN for sleep.    Continue Ativan 0.5 mg HS PRN for severe anxiety or panic attacks only.  Controlled medication treatment agreement sent to signed in 2024.  Patient currently not interested in psychotherapy.  Medication options, alternatives (including no medications) and medication risks/benefits/side effects were discussed in detail.  The patient was advised to call, message provider on [facility], or come in to the clinic if symptoms worsen or if any future questions/issues regarding their medications  arise; the patient verbalized understanding and agreement.    The patient was educated to call 911, call the suicide hotline, or go to local ER if having thoughts of suicide or homicide; verbalized understanding.      Billing Coding based on:  44129 based on MDM    Return to clinic in 3-6 months or sooner if symptoms worsen.  Next Appointment: instruction provided on how to make the next appointment.     The proposed treatment plan was discussed with the patient who was provided the opportunity to ask questions and make suggestions regarding alternative treatment. Patient verbalized understanding and expressed agreement with the plan.       Paul Lin M.D.  02/06/25    This note was created using voice recognition software (Dragon). The accuracy of the dictation is limited by the abilities of the software. I have reviewed the note prior to signing, however some errors in grammar and context are still possible. If you have any questions related to this note please do not hesitate to contact our office.

## 2025-02-28 ENCOUNTER — TELEMEDICINE (OUTPATIENT)
Dept: MEDICAL GROUP | Facility: MEDICAL CENTER | Age: 50
End: 2025-02-28
Payer: OTHER GOVERNMENT

## 2025-02-28 VITALS — WEIGHT: 210 LBS | HEIGHT: 73 IN | BODY MASS INDEX: 27.83 KG/M2

## 2025-02-28 DIAGNOSIS — G43.111 INTRACTABLE MIGRAINE WITH AURA WITH STATUS MIGRAINOSUS: ICD-10-CM

## 2025-02-28 DIAGNOSIS — G47.09 OTHER INSOMNIA: ICD-10-CM

## 2025-02-28 RX ORDER — MIRTAZAPINE 7.5 MG/1
7.5 TABLET, FILM COATED ORAL NIGHTLY
Qty: 30 TABLET | Refills: 3 | Status: SHIPPED | OUTPATIENT
Start: 2025-02-28 | End: 2025-06-28

## 2025-02-28 ASSESSMENT — FIBROSIS 4 INDEX: FIB4 SCORE: 0.47

## 2025-02-28 NOTE — LETTER
February 28, 2025         Patient: Mike Rios   YOB: 1975   Date of Visit: 2/28/2025           To Whom it May Concern:    I am writing on behalf of my patient, Mike Rios, who has been under my medical care for the management of multiple chronic medical conditions, including:  Major Depressive Disorder with Anxious Distress  Generalized Anxiety Disorder & PTSD  Obstructive Sleep Apnea  Carpal Tunnel Syndrome (Bilateral)  Chronic Back Pain & Shoulder Strain  Temporomandibular Joint Disorder (TMD)  Patellofemoral Pain Syndrome (Bilateral Knees)  Benign Paroxysmal Positional Vertigo (BPPV)  Plantar Fasciitis (Bilateral)  Tinnitus  Migraine     Due to worsening symptoms related to these conditions, Mr. Rios has been experiencing severe anxiety, persistent fatigue, chronic pain, frequent headaches, and difficulty concentrating- all of which have significantly impacted his ability to sustain work-related functions.     These symptoms have been exacerbated by ongoing threats and uncertainty stemming from the [email or web address] ([employer]), including potential job termination regardless of work performance. The continuous stress surrounding workforce reductions (RIF), job instability, and policy changes imposed by [employer] has directly contributed to his declining mental and physical health.     To address his worsening condition, I am recommending that Mr. Rios take a medical leave of absence for 1 week, March 10 through March 14 to allow for necessary treatment adjustments, evaluations, and recovery.     It is important to note that Mr. Rios was previously granted a Reasonable Accommodation (RA) for remote work, which has enabled him to continue working despite his medical limitations. However, if his RA is revoked, or if he is forced to relocate or return to an in-office setting, it will likely result in a severe deterioration of his condition.     Mr. Rios’s conditions are  chronic and expected to persist for at least 12 months or longer. If he is subject to a Reduction in Force (RIF), job separation, or the removal of his accommodations, he will be unable to perform in-person work due to the severity of his conditions. 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   If you require any further medical documentation, please feel free to contact my office at [phone]. Thank you for your understanding and support of Mr. Rios’s medical needs.        Sincerely,           Aamir Shepherd P.A.-C.  Electronically Signed

## 2025-02-28 NOTE — PROGRESS NOTES
Subjective:     History of Present Illness  The patient presents via virtual visit for evaluation of sleep issues.    This evaluation was conducted via Teams using secure and encrypted videoconferencing technology. The patient was in their home in the [facility].    The patient's identity was confirmed and verbal consent was obtained for this virtual visit.      He is experiencing significant sleep disturbances, with an average of only 3 to 4 hours of sleep per night. He has been prescribed zolpidem but is unable to take it daily due to its ineffectiveness. He has also tried NyQuil, which provided some relief. He is seeking a daily medication to manage his sleep issues. He has consulted his psychiatrist regarding his sleep issues and has a follow-up appointment scheduled for next month. He is planning to take a medical leave next month to alleviate his current stress levels.    He has been diagnosed with PTSD by his psychiatrist, a condition he was previously unaware of. This diagnosis was not recognized as a disability by the [facility] and was not included in his claim application. He is currently under the care of a psychiatrist, with whom he has monthly appointments. He reports experiencing persistent headaches, which he attributes to stress rather than migraines. He describes a sensation of carrying a heavy burden, leading to daily fatigue. He is requesting a medical leave letter to facilitate his time off from work.    MEDICATIONS  Current: zolpidem, NyQuil      Current medicines (including changes today)  Current Outpatient Medications   Medication Sig Dispense Refill    mirtazapine (REMERON) 7.5 MG tablet Take 1 Tablet by mouth every evening for 120 days. 30 Tablet 3    omeprazole (PRILOSEC) 20 MG delayed-release capsule TAKE ONE CAPSULE BY MOUTH TWICE DAILY 30 MINUTES PRIOR TO SAME MEAL 180 Capsule 2    prazosin (MINIPRESS) 2 MG Cap TAKE 2 CAPSULES BY MOUTH EVERY EVENING 180 Capsule 2    propranolol (INDERAL)  20 MG Tab TAKE 1 TABLET BY MOUTH TWICE DAILY AS NEEDED FOR ANXIETY 180 Tablet 2    sertraline (ZOLOFT) 100 MG Tab TAKE 2 TABLETS BY MOUTH EVERY  Tablet 2    finasteride (PROSCAR) 5 MG Tab Take 1 Tablet by mouth every day for 360 days. 90 Tablet 3    buPROPion (WELLBUTRIN XL) 300 MG XL tablet Take 1 Tablet by mouth every morning. 90 Tablet 3    cyclobenzaprine (FLEXERIL) 10 mg Tab Take 1 Tablet by mouth 3 times a day as needed for Muscle Spasms. 60 Tablet 5    zolpidem (AMBIEN) 10 MG Tab Take 10 mg by mouth at bedtime as needed for Sleep.      LORazepam (ATIVAN) 0.5 MG Tab       atorvastatin (LIPITOR) 40 MG Tab TAKE 1 TABLET BY MOUTH EVERY DAY 90 Tablet 3    sumatriptan (IMITREX) 100 MG tablet TAKE 1 TABLET BY MOUTH 1 TIME AS NEEDED FOR MIGRAINE. MAY TAKE SECOND TABLET 2 HOURS AFTER IF STILL AT ONSET OF HEADACHE 9 Tablet 11    sildenafil citrate (VIAGRA) 100 MG tablet TAKE 1 TABLET BY MOUTH EVERY DAY 30 MINUTES BEFORE ACTIVITY 9 Tablet 11    finasteride (PROSCAR) 5 MG Tab TAKE 1 TABLET BY MOUTH EVERY DAY 90 Tablet 3    indomethacin (INDOCIN) 25 MG Cap TAKE 1 CAPSULE BY MOUTH THREE TIMES DAILY FOR 3 DAYS THEN 2 CAPSULES THREE TIMES DAILY FOR 3 DAYS THEN 3 CAPSULES THREE TIMES DAILY FOR 3 DAYS 54 Capsule 0    Hypromell-Glycerin-Naphazoline (CLEAR EYES FOR DRY EYES PLUS) 0.8-0.25-0.012 % Solution Administer 1 Drop into affected eye(s) every four hours as needed (Bilateral). 30 mL 11    DYMISTA 137-50 MCG/ACT Suspension Administer 2 Sprays into affected nostril(S) every day. 23 g 5    albuterol 108 (90 Base) MCG/ACT Aero Soln inhalation aerosol Inhale 2 Puffs every four hours as needed. 18 g 11    naproxen (NAPROSYN) 500 MG Tab TAKE 1 TABLET BY MOUTH TWICE DAILY WITH MEALS 60 Tablet 0     No current facility-administered medications for this visit.     He  has a past medical history of Back pain, Central sleep apnea, Depression, and FLIP (obstructive sleep apnea).    He has no past medical history of ASTHMA,  "Diabetes, Seizure (HCC), or Ulcer.    ROS   No chest pain, no shortness of breath, no abdominal pain  Positive ROS as per HPI.  All other systems reviewed and are negative.     Objective:     Ht 1.854 m (6' 1\")   Wt 95.3 kg (210 lb)  Body mass index is 27.71 kg/m².   Physical Exam    Constitutional: Alert, no distress.  Skin: Warm, dry, good turgor, no rashes in visible areas.  Eye: Equal, round and reactive, conjunctiva clear, lids normal.  ENMT: Lips without lesions, good dentition, oropharynx clear.  Neck: Trachea midline, no masses, no thyromegaly.   Psych: Alert and oriented x3, normal affect and mood.      Results          Assessment and Plan:   The following treatment plan was discussed    Assessment & Plan  1. Insomnia.  He reports difficulty sleeping, only getting 3 to 4 hours of sleep per night. He has been using zolpidem 10 mg but finds it ineffective and is concerned about taking it daily. A prescription for mirtazapine 7.5 mg has been issued, with instructions to take one tablet at bedtime. If there is no improvement, he may increase the dosage to two tablets. A total of 30 tablets with three refills will be provided. He is advised not to combine mirtazapine with any other sleep aids. A communication has been sent to Dr. Lin regarding the initiation of mirtazapine to ensure it aligns with his treatment plan. Repeatedly mentioned to him not to take Ambien with mirtazapine. If mirtazapine is not effective he may try over-the-counter ZzzQuil. Follow-up with psychiatry on the 12th.    2. Post-traumatic stress disorder (PTSD).  He mentions that his psychiatrist diagnosed him with PTSD, which was not previously recognized by the [facility] as a disability. He is currently seeing his psychiatrist monthly and has an upcoming appointment on March 12 or 13. He is advised to continue with his psychiatric appointments and follow the treatment plan as directed by his psychiatrist.    3. Medical leave.  A medical leave " letter has been prepared for him, covering the period from March 10 to March 14, 2025. He has the option to extend this leave to 2 or 3 weeks if necessary.      ORDERS:  1. Other insomnia    - mirtazapine (REMERON) 7.5 MG tablet; Take 1 Tablet by mouth every evening for 120 days.  Dispense: 30 Tablet; Refill: 3    2. Intractable migraine with aura with status migrainosus          Please note that this dictation was created using voice recognition software. I have made every reasonable attempt to correct obvious errors, but I expect that there are errors of grammar and possibly content that I did not discover before finalizing the note.      Attestation      Verbal consent was acquired by the patient to use [employer] ambient listening note generation during this visit: No

## 2025-03-12 ENCOUNTER — TELEMEDICINE (OUTPATIENT)
Dept: BEHAVIORAL HEALTH | Facility: CLINIC | Age: 50
End: 2025-03-12
Payer: OTHER GOVERNMENT

## 2025-03-12 DIAGNOSIS — F33.42 RECURRENT MAJOR DEPRESSIVE DISORDER, IN FULL REMISSION (HCC): ICD-10-CM

## 2025-03-12 DIAGNOSIS — F43.12 CHRONIC POST-TRAUMATIC STRESS DISORDER (PTSD): ICD-10-CM

## 2025-03-12 DIAGNOSIS — F41.1 GAD (GENERALIZED ANXIETY DISORDER): ICD-10-CM

## 2025-03-12 DIAGNOSIS — G47.09 OTHER INSOMNIA: ICD-10-CM

## 2025-03-12 PROCEDURE — 99214 OFFICE O/P EST MOD 30 MIN: CPT | Mod: 95 | Performed by: PSYCHIATRY & NEUROLOGY

## 2025-03-12 RX ORDER — BUSPIRONE HYDROCHLORIDE 10 MG/1
TABLET ORAL
Qty: 45 TABLET | Refills: 0 | Status: SHIPPED | OUTPATIENT
Start: 2025-03-12 | End: 2025-04-11

## 2025-03-12 RX ORDER — RAMELTEON 8 MG/1
8 TABLET ORAL NIGHTLY
Qty: 30 TABLET | Refills: 6 | Status: SHIPPED | OUTPATIENT
Start: 2025-03-12

## 2025-03-12 NOTE — LETTER
March 12, 2025    To Whom It May Concern:         This is confirmation that Mike Rios attended his scheduled appointment with Paul Lin M.D. on 3/12/25 for management of Major Depressive Disorder, Generalized Anxiety Disorder, and Post-Traumatic Stress Disorder (PTSD). Over the past several weeks, his condition has significantly worsened due to work-related stress, particularly due to recent job uncertainty and agency policy changes that have caused severe psychological distress.     Should the patient's mental health further deteriorate due to work-related stress, I would strongly advise that he be allowed to work remotely from home.    Sincerely,    Paul Lin M.D.  [phone]

## 2025-03-12 NOTE — PROGRESS NOTES
This evaluation was conducted via Teams using secure and encrypted videoconferencing technology. The patient was in their home in the [facility].    The patient's identity was confirmed and verbal consent was obtained for this virtual visit.      PSYCHIATRY FOLLOW-UP NOTE      Name: Mike Rios  MRN: [id number]  : 1975  Age: 49 y.o.  Date of assessment: 3/12/2025  PCP: Aamir Shepherd P.A.-C.  Persons in attendance: Patient      REASON FOR VISIT/CHIEF COMPLAINT (as stated by Patient):  Mike Rios is a 49 y.o.,   White male, attending follow-up appointment for mood and anxiety management.      HISTORY OF PRESENT ILLNESS:  Mike Rios is a 49 y.o. old male with MDD, LAKHWINDER, PTSD and panic attacks comes in today for follow up. Patient was last seen 1 month ago, and following treatment planning recommendations were done:  Continue Zoloft 200 mg daily for depression, anxiety and PTSD management.  Continue Wellbutrin  mg daily for depression augmentation.  Continue Prazosin 4 mg at bedtime for PTSD nightmares management.  Continue Propanolol 20 mg BID PRN for morning anxiety. Do not use this medication within 6 hours of prazosin use.  Continue Ambien 10 mg HS PRN for sleep.    Continue Ativan 0.5 mg HS PRN for severe anxiety or panic attacks only.  Controlled medication treatment agreement sent to signed in 2024.  Patient currently not interested in psychotherapy.    History of Present Illness      Patient was recently given mirtazapine by his primary care provider for sleep but patient reports feeling groggy and having increased craving for sweets.  Agreed with plan of stopping mirtazapine and considering ramelteon.  Patient is currently alternating Ambien 1 night followed by ZzzQuil followed by mirtazapine.  Patient have heightened emotional reactivity with anxiety and insomnia noted due to the recent request from work to return to work place and person.  This is causing  heightened reactivity and patient is requesting a letter from a physician, which was sent to his my chart today.  Patient is on multiple medications but these recent stressors are causing destabilization of his underlying mental health.  After reviewing risk and benefit agreed with short course of considering addition of ramelteon to Ambien for sleep and cautiously adding BuSpar to Zoloft with close monitoring for serotonin syndrome.      CURRENT MEDICATIONS:  Current Outpatient Medications   Medication Sig Dispense Refill    mirtazapine (REMERON) 7.5 MG tablet Take 1 Tablet by mouth every evening for 120 days. 30 Tablet 3    omeprazole (PRILOSEC) 20 MG delayed-release capsule TAKE ONE CAPSULE BY MOUTH TWICE DAILY 30 MINUTES PRIOR TO SAME MEAL 180 Capsule 2    prazosin (MINIPRESS) 2 MG Cap TAKE 2 CAPSULES BY MOUTH EVERY EVENING 180 Capsule 2    propranolol (INDERAL) 20 MG Tab TAKE 1 TABLET BY MOUTH TWICE DAILY AS NEEDED FOR ANXIETY 180 Tablet 2    sertraline (ZOLOFT) 100 MG Tab TAKE 2 TABLETS BY MOUTH EVERY  Tablet 2    finasteride (PROSCAR) 5 MG Tab Take 1 Tablet by mouth every day for 360 days. 90 Tablet 3    buPROPion (WELLBUTRIN XL) 300 MG XL tablet Take 1 Tablet by mouth every morning. 90 Tablet 3    cyclobenzaprine (FLEXERIL) 10 mg Tab Take 1 Tablet by mouth 3 times a day as needed for Muscle Spasms. 60 Tablet 5    zolpidem (AMBIEN) 10 MG Tab Take 10 mg by mouth at bedtime as needed for Sleep.      LORazepam (ATIVAN) 0.5 MG Tab       atorvastatin (LIPITOR) 40 MG Tab TAKE 1 TABLET BY MOUTH EVERY DAY 90 Tablet 3    sumatriptan (IMITREX) 100 MG tablet TAKE 1 TABLET BY MOUTH 1 TIME AS NEEDED FOR MIGRAINE. MAY TAKE SECOND TABLET 2 HOURS AFTER IF STILL AT ONSET OF HEADACHE 9 Tablet 11    sildenafil citrate (VIAGRA) 100 MG tablet TAKE 1 TABLET BY MOUTH EVERY DAY 30 MINUTES BEFORE ACTIVITY 9 Tablet 11    finasteride (PROSCAR) 5 MG Tab TAKE 1 TABLET BY MOUTH EVERY DAY 90 Tablet 3    indomethacin (INDOCIN) 25 MG  Cap TAKE 1 CAPSULE BY MOUTH THREE TIMES DAILY FOR 3 DAYS THEN 2 CAPSULES THREE TIMES DAILY FOR 3 DAYS THEN 3 CAPSULES THREE TIMES DAILY FOR 3 DAYS 54 Capsule 0    Hypromell-Glycerin-Naphazoline (CLEAR EYES FOR DRY EYES PLUS) 0.8-0.25-0.012 % Solution Administer 1 Drop into affected eye(s) every four hours as needed (Bilateral). 30 mL 11    DYMISTA 137-50 MCG/ACT Suspension Administer 2 Sprays into affected nostril(S) every day. 23 g 5    albuterol 108 (90 Base) MCG/ACT Aero Soln inhalation aerosol Inhale 2 Puffs every four hours as needed. 18 g 11    naproxen (NAPROSYN) 500 MG Tab TAKE 1 TABLET BY MOUTH TWICE DAILY WITH MEALS 60 Tablet 0     No current facility-administered medications for this visit.       MEDICAL HISTORY  Past Medical History:   Diagnosis Date    Back pain     Central sleep apnea     Depression     FLIP (obstructive sleep apnea)      Past Surgical History:   Procedure Laterality Date    HERNIA REPAIR           PAST PSYCHIATRIC MEDICATIONS  Sertraline  Bupropion  Prazosin  Quetiapine  Ativan  Ambien      REVIEW OF SYSTEMS:        Constitutional negative   Eyes negative   Ears/Nose/Mouth/Throat  Sleep Apnea on CPAP   Cardiovascular  Hyperlipidemia   Respiratory negative   Gastrointestinal  GERD   Genitourinary negative   Muscular negative   Integumentary negative   Neurological  migraines   Endocrine negative   Hematologic/Lymphatic negative       PHYSICAL EXAMINAION:  Vital signs: There were no vitals taken for this visit.  Musculoskeletal: Normal gait.   Abnormal movements: none      MENTAL STATUS EXAMINATION      General:   - Grooming and hygiene: Casual,   - Apparent distress: tense,   - Behavior: Tense  - Eye Contact:  Good,   - no psychomotor agitation or retardation   - Participation: Active verbal participation  Orientation: Alert and Fully Oriented to person, place and time  Mood: Anxious  Affect: Flexible,  Thought Process: Logical and Goal-directed  Thought Content: Denies suicidal or  homicidal ideations, intent or plan   Perception: Denies auditory or visual hallucinations. No delusions noted   Attention span and concentration: Intact   Speech:Rate within normal limits and Volume within normal limits  Language: Appropriate   Insight: Good  Judgment: Good  Recent and remote memory: No gross evidence of memory deficits        DEPRESSION SCREENIN/12/2023     1:36 PM 3/15/2024    10:20 AM 2024    11:24 AM   Depression Screen (PHQ-2/PHQ-9)   PHQ-2 Total Score 2  2   PHQ-2 Total Score  0    PHQ-9 Total Score 15  10       Interpretation of PHQ-9 Total Score   Score Severity   1-4 No Depression   5-9 Mild Depression   10-14 Moderate Depression   15-19 Moderately Severe Depression   20-27 Severe Depression    CURRENT RISK:       Suicidal: Low       Homicidal: Low       Self-Harm: Low       Relapse: Low       Crisis Safety Plan Reviewed Not Indicated       If evidence of imminent risk is present, intervention/plan:      MEDICAL RECORDS/LABS/DIAGNOSTIC TESTS REVIEWED:  No new lab since last visit     [place]  records -   Reviewed     PLAN:  (1) MDD; (2) LAKHWINDER; (3) PTSD; (4) Panic attacks   Recent increase in anxiety & insomnia (due to executive order of returning to work)  Continue Zoloft 200 mg daily for depression, anxiety and PTSD management.  Add Buspar 5 mg BID X 2 weeks --> 10  mg BID  Stop Mirtazapine  Add Ramelteon 8 mg HS PRN for sleep.  Continue Wellbutrin  mg daily for depression augmentation.  Continue Prazosin 4 mg at bedtime for PTSD nightmares management.  Continue Propanolol 20 mg BID PRN for morning anxiety. Do not use this medication within 6 hours of prazosin use.  Continue Ambien 10 mg HS PRN for sleep.    Continue Ativan 0.5 mg HS PRN for severe anxiety or panic attacks only.  Controlled medication treatment agreement sent to signed in 2024.  Patient currently not interested in psychotherapy.  Medication options, alternatives (including no medications) and  medication risks/benefits/side effects were discussed in detail.  The patient was advised to call, message provider on MyChart, or come in to the clinic if symptoms worsen or if any future questions/issues regarding their medications arise; the patient verbalized understanding and agreement.    The patient was educated to call 911, call the suicide hotline, or go to local ER if having thoughts of suicide or homicide; verbalized understanding.      Billing Coding based on:  28333 based on MDM    Return to clinic in 1 month or sooner if symptoms worsen.  Next Appointment: instruction provided on how to make the next appointment.     The proposed treatment plan was discussed with the patient who was provided the opportunity to ask questions and make suggestions regarding alternative treatment. Patient verbalized understanding and expressed agreement with the plan.       Paul Lin M.D.  03/12/25    This note was created using voice recognition software (Dragon). The accuracy of the dictation is limited by the abilities of the software. I have reviewed the note prior to signing, however some errors in grammar and context are still possible. If you have any questions related to this note please do not hesitate to contact our office.

## 2025-04-06 DIAGNOSIS — G47.09 OTHER INSOMNIA: ICD-10-CM

## 2025-04-07 RX ORDER — MIRTAZAPINE 7.5 MG/1
7.5 TABLET, FILM COATED ORAL EVERY EVENING
Qty: 90 TABLET | Refills: 3 | Status: SHIPPED | OUTPATIENT
Start: 2025-04-07 | End: 2025-04-15

## 2025-04-15 ENCOUNTER — TELEMEDICINE (OUTPATIENT)
Dept: BEHAVIORAL HEALTH | Facility: CLINIC | Age: 50
End: 2025-04-15
Payer: OTHER GOVERNMENT

## 2025-04-15 DIAGNOSIS — F33.2 SEVERE EPISODE OF RECURRENT MAJOR DEPRESSIVE DISORDER, WITHOUT PSYCHOTIC FEATURES (HCC): ICD-10-CM

## 2025-04-15 DIAGNOSIS — F41.1 GAD (GENERALIZED ANXIETY DISORDER): ICD-10-CM

## 2025-04-15 DIAGNOSIS — F43.12 CHRONIC POST-TRAUMATIC STRESS DISORDER (PTSD): ICD-10-CM

## 2025-04-15 DIAGNOSIS — G47.09 OTHER INSOMNIA: ICD-10-CM

## 2025-04-15 DIAGNOSIS — F33.42 RECURRENT MAJOR DEPRESSIVE DISORDER, IN FULL REMISSION (HCC): ICD-10-CM

## 2025-04-15 PROCEDURE — 99214 OFFICE O/P EST MOD 30 MIN: CPT | Mod: GT | Performed by: PSYCHIATRY & NEUROLOGY

## 2025-04-15 RX ORDER — RAMELTEON 8 MG/1
8 TABLET ORAL NIGHTLY
Qty: 30 TABLET | Refills: 6 | Status: SHIPPED | OUTPATIENT
Start: 2025-04-15

## 2025-04-15 RX ORDER — PROPRANOLOL HCL 20 MG
TABLET ORAL
Qty: 180 TABLET | Refills: 2 | Status: SHIPPED | OUTPATIENT
Start: 2025-04-15

## 2025-04-15 RX ORDER — BUPROPION HYDROCHLORIDE 300 MG/1
300 TABLET ORAL EVERY MORNING
Qty: 90 TABLET | Refills: 3 | Status: SHIPPED | OUTPATIENT
Start: 2025-04-15

## 2025-04-15 RX ORDER — ZOLPIDEM TARTRATE 10 MG/1
10 TABLET ORAL
Qty: 30 TABLET | Refills: 5 | Status: SHIPPED | OUTPATIENT
Start: 2025-04-15 | End: 2025-05-15

## 2025-04-15 RX ORDER — SERTRALINE HYDROCHLORIDE 100 MG/1
200 TABLET, FILM COATED ORAL DAILY
Qty: 180 TABLET | Refills: 2 | Status: SHIPPED | OUTPATIENT
Start: 2025-04-15

## 2025-04-15 RX ORDER — PRAZOSIN HYDROCHLORIDE 2 MG/1
4 CAPSULE ORAL NIGHTLY
Qty: 180 CAPSULE | Refills: 2 | Status: SHIPPED | OUTPATIENT
Start: 2025-04-15

## 2025-04-15 ASSESSMENT — PATIENT HEALTH QUESTIONNAIRE - PHQ9
3. TROUBLE FALLING OR STAYING ASLEEP OR SLEEPING TOO MUCH: 1
CLINICAL INTERPRETATION OF PHQ2 SCORE: 0
4. FEELING TIRED OR HAVING LITTLE ENERGY: MORE THAN HALF THE DAYS
9. THOUGHTS THAT YOU WOULD BE BETTER OFF DEAD, OR OF HURTING YOURSELF: SEVERAL DAYS
5. POOR APPETITE OR OVEREATING: 2
9. THOUGHTS THAT YOU WOULD BE BETTER OFF DEAD, OR OF HURTING YOURSELF: 1
8. MOVING OR SPEAKING SO SLOWLY THAT OTHER PEOPLE COULD HAVE NOTICED. OR THE OPPOSITE, BEING SO FIGETY OR RESTLESS THAT YOU HAVE BEEN MOVING AROUND A LOT MORE THAN USUAL: MORE THAN HALF THE DAYS
2. FEELING DOWN, DEPRESSED, IRRITABLE, OR HOPELESS: NEARLY EVERY DAY
7. TROUBLE CONCENTRATING ON THINGS, SUCH AS READING THE NEWSPAPER OR WATCHING TELEVISION: 2
6. FEELING BAD ABOUT YOURSELF - OR THAT YOU ARE A FAILURE OR HAVE LET YOURSELF OR YOUR FAMILY DOWN: 2
1. LITTLE INTEREST OR PLEASURE IN DOING THINGS: 2
5. POOR APPETITE OR OVEREATING: MORE THAN HALF THE DAYS
6. FEELING BAD ABOUT YOURSELF - OR THAT YOU ARE A FAILURE OR HAVE LET YOURSELF OR YOUR FAMILY DOWN: MORE THAN HALF THE DAYS
10. IF YOU CHECKED OFF ANY PROBLEMS, HOW DIFFICULT HAVE THESE PROBLEMS MADE IT FOR YOU TO DO YOUR WORK, TAKE CARE OF THINGS AT HOME, OR GET ALONG WITH OTHER PEOPLE: VERY DIFFICULT
4. FEELING TIRED OR HAVING LITTLE ENERGY: 2
3. TROUBLE FALLING OR STAYING ASLEEP OR SLEEPING TOO MUCH: SEVERAL DAYS
7. TROUBLE CONCENTRATING ON THINGS, SUCH AS READING THE NEWSPAPER OR WATCHING TELEVISION: MORE THAN HALF THE DAYS
1. LITTLE INTEREST OR PLEASURE IN DOING THINGS: MORE THAN HALF THE DAYS
5. POOR APPETITE OR OVEREATING: 2 - MORE THAN HALF THE DAYS
SUM OF ALL RESPONSES TO PHQ QUESTIONS 1-9: 17
8. MOVING OR SPEAKING SO SLOWLY THAT OTHER PEOPLE COULD HAVE NOTICED. OR THE OPPOSITE, BEING SO FIGETY OR RESTLESS THAT YOU HAVE BEEN MOVING AROUND A LOT MORE THAN USUAL: 2
2. FEELING DOWN, DEPRESSED, IRRITABLE, OR HOPELESS: 3

## 2025-04-16 ENCOUNTER — HOSPITAL ENCOUNTER (OUTPATIENT)
Dept: HEMATOLOGY ONCOLOGY | Facility: MEDICAL CENTER | Age: 50
End: 2025-04-16
Attending: INTERNAL MEDICINE
Payer: OTHER GOVERNMENT

## 2025-04-16 DIAGNOSIS — R79.89 HIGH SERUM FERRITIN: ICD-10-CM

## 2025-04-16 PROCEDURE — 99214 OFFICE O/P EST MOD 30 MIN: CPT | Mod: 95 | Performed by: INTERNAL MEDICINE

## 2025-04-16 NOTE — PROGRESS NOTES
PROGRESS NOTE: HEMATOLOGY/ONCOLOGY       Chief Complaint:  Follow up for high ferritin due to oral iron intake and history iron deficiency anemia of unknown cause.      HPI:      Mike Rios is a pleasant 49-year-old white male who has a history of iron deficiency anemia.  He was diagnosed with iron deficiency anemia with a ferritin level of 19 on March 21, 2019 which is diagnostic of iron deficiency anemia.  Patient never had a bleeding and the cause of his iron deficiency anemia on March 2019 is not totally clear. He took oral iron tablet supplementation for about 1 year at the time along with Barley grass which contains iron.  He presented with a ferritin level of 1108 on August 28, 2023 and had a hemochromatosis gene study which showed H63D D heterozygosity. He has been on monthly therapeutic phlebotomies and he had about 6-7 phlebotomies.  Follow-up ferritin level was 111 on September 22, 2024.  Therapeutic phlebotomy was discontinued on October 2024.    He also has a history of Hb E trait when he had a hemoglobin electrophoresis on March 18, 2024.  Hemoglobin electrophoresis showed hemoglobin A level was 71.9 and E level of 24.8.     He comes for routine follow-up today.     ROS:    Constitutional: No weight loss  Skin: No rash or jaundice  HENT: No change in eyesight or hearing  Cardiovascular:No chest pain or arrythmia  Respiratory:No cough or SOB  GI:No nausea, vomiting, diarrhea, constipation  :No dysuria or frequency  Musculoskeletal:No bone or joint pain  Neuro:No sx's of neuropathy  Psych: No complaints    PMH:      Allergies   Allergen Reactions    Pcn [Penicillins] Anaphylaxis       Past Medical History:   Diagnosis Date    Back pain     Central sleep apnea     Depression     FLIP (obstructive sleep apnea)         Past Surgical History:   Procedure Laterality Date    HERNIA REPAIR          Medications:    Current Outpatient Medications on File Prior to Encounter   Medication Sig Dispense Refill     propranolol (INDERAL) 20 MG Tab TAKE 1 TABLET BY MOUTH TWICE DAILY AS NEEDED FOR ANXIETY 180 Tablet 2    prazosin (MINIPRESS) 2 MG Cap Take 2 Capsules by mouth every evening. 180 Capsule 2    buPROPion (WELLBUTRIN XL) 300 MG XL tablet Take 1 Tablet by mouth every morning. 90 Tablet 3    ramelteon (ROZEREM) 8 MG tablet Take 1 Tablet by mouth every evening. 30 Tablet 6    zolpidem (AMBIEN) 10 MG Tab Take 1 Tablet by mouth at bedtime as needed for Sleep for up to 30 days. 30 Tablet 5    sertraline (ZOLOFT) 100 MG Tab Take 2 Tablets by mouth every day. 180 Tablet 2    omeprazole (PRILOSEC) 20 MG delayed-release capsule TAKE ONE CAPSULE BY MOUTH TWICE DAILY 30 MINUTES PRIOR TO SAME MEAL 180 Capsule 2    finasteride (PROSCAR) 5 MG Tab Take 1 Tablet by mouth every day for 360 days. 90 Tablet 3    cyclobenzaprine (FLEXERIL) 10 mg Tab Take 1 Tablet by mouth 3 times a day as needed for Muscle Spasms. 60 Tablet 5    LORazepam (ATIVAN) 0.5 MG Tab       atorvastatin (LIPITOR) 40 MG Tab TAKE 1 TABLET BY MOUTH EVERY DAY 90 Tablet 3    sumatriptan (IMITREX) 100 MG tablet TAKE 1 TABLET BY MOUTH 1 TIME AS NEEDED FOR MIGRAINE. MAY TAKE SECOND TABLET 2 HOURS AFTER IF STILL AT ONSET OF HEADACHE 9 Tablet 11    sildenafil citrate (VIAGRA) 100 MG tablet TAKE 1 TABLET BY MOUTH EVERY DAY 30 MINUTES BEFORE ACTIVITY 9 Tablet 11    finasteride (PROSCAR) 5 MG Tab TAKE 1 TABLET BY MOUTH EVERY DAY 90 Tablet 3    indomethacin (INDOCIN) 25 MG Cap TAKE 1 CAPSULE BY MOUTH THREE TIMES DAILY FOR 3 DAYS THEN 2 CAPSULES THREE TIMES DAILY FOR 3 DAYS THEN 3 CAPSULES THREE TIMES DAILY FOR 3 DAYS 54 Capsule 0    Hypromell-Glycerin-Naphazoline (CLEAR EYES FOR DRY EYES PLUS) 0.8-0.25-0.012 % Solution Administer 1 Drop into affected eye(s) every four hours as needed (Bilateral). 30 mL 11    DYMISTA 137-50 MCG/ACT Suspension Administer 2 Sprays into affected nostril(S) every day. 23 g 5    albuterol 108 (90 Base) MCG/ACT Aero Soln inhalation aerosol Inhale 2  Puffs every four hours as needed. 18 g 11    naproxen (NAPROSYN) 500 MG Tab TAKE 1 TABLET BY MOUTH TWICE DAILY WITH MEALS 60 Tablet 0     No current facility-administered medications on file prior to encounter.       Social History     Tobacco Use    Smoking status: Never    Smokeless tobacco: Never   Substance Use Topics    Alcohol use: Yes     Alcohol/week: 0.0 oz     Comment: Rare        Family History   Problem Relation Age of Onset    Heart Attack Other     Hypertension Father     Heart Disease Father     Hyperlipidemia Father     Cancer Mother         Lung CA    Psychiatric Illness Neg Hx         Objective    Vitals:    There were no vitals taken for this visit.    Physical Exam:    Appears well-developed and well-nourished. No distress.    Head -  Normocephalic .   Eyes - Pupils are equal. Conjunctivae normal. No scleral icterus.   Ears - normal hearing  Neurological -   Alert and oriented.  Skin - Skin is warm and dry. No rash noted. Not diaphoretic. No erythema. No pallor. No jaundice   Psychiatric -  Normal mood and affect.      Assessment and plan    #1.  History of iron deficiency anemia of unknown cause and high ferritin level due to oral iron supplementation.  He presented with iron deficiency anemia on March 21, 2019 when he had ferritin level of 19 which is diagnostic of iron deficiency anemia.  He never had bleeding and the cause of his iron deficiency anemia is not clear.  Regardless, he took oral iron supplementation for almost 1 year which caused his ferritin level goes up to 1108 on August 28, 2023.  He had a total 6-7 monthly phlebotomy and that his follow-up ferritin level is 111 on September 22, 2024 and phlebotomy was discontinued. Patient had a hemochromatosis gene study on August 30, 2023 which showed H63D heterozygous gene mutation which does not cause hemochromatosis.  The cause of his elevated ferritin level is unclear also.    I discussed with him regarding further management and care.   I will go ahead with a follow-up CBC, CMP, and a ferritin level today and I will arrange virtual visit in 1 week.  If his ferritin level is acceptable at that time, he will be discharged to primary care physician.    #2. Hb E trait  Patient had a hemoglobin electrophoresis which showed hemoglobin E level of 24.8 which is causing low MCV on March 18, 2024.  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    Thank you for allowing me to participate in your patient care.    Huseyin Rome M.D.

## 2025-04-19 ENCOUNTER — HOSPITAL ENCOUNTER (OUTPATIENT)
Dept: LAB | Facility: MEDICAL CENTER | Age: 50
End: 2025-04-19
Attending: INTERNAL MEDICINE
Payer: OTHER GOVERNMENT

## 2025-04-19 DIAGNOSIS — R79.89 HIGH SERUM FERRITIN: ICD-10-CM

## 2025-04-19 LAB
ALBUMIN SERPL BCP-MCNC: 4.1 G/DL (ref 3.2–4.9)
ALBUMIN/GLOB SERPL: 1.2 G/DL
ALP SERPL-CCNC: 81 U/L (ref 30–99)
ALT SERPL-CCNC: 39 U/L (ref 2–50)
ANION GAP SERPL CALC-SCNC: 13 MMOL/L (ref 7–16)
AST SERPL-CCNC: 28 U/L (ref 12–45)
BASOPHILS # BLD AUTO: 0.6 % (ref 0–1.8)
BASOPHILS # BLD: 0.04 K/UL (ref 0–0.12)
BILIRUB SERPL-MCNC: 0.6 MG/DL (ref 0.1–1.5)
BUN SERPL-MCNC: 21 MG/DL (ref 8–22)
CALCIUM ALBUM COR SERPL-MCNC: 9.2 MG/DL (ref 8.5–10.5)
CALCIUM SERPL-MCNC: 9.3 MG/DL (ref 8.5–10.5)
CHLORIDE SERPL-SCNC: 103 MMOL/L (ref 96–112)
CO2 SERPL-SCNC: 24 MMOL/L (ref 20–33)
CREAT SERPL-MCNC: 1.44 MG/DL (ref 0.5–1.4)
EOSINOPHIL # BLD AUTO: 0.17 K/UL (ref 0–0.51)
EOSINOPHIL NFR BLD: 2.7 % (ref 0–6.9)
ERYTHROCYTE [DISTWIDTH] IN BLOOD BY AUTOMATED COUNT: 38.2 FL (ref 35.9–50)
FERRITIN SERPL-MCNC: 246 NG/ML (ref 22–322)
GFR SERPLBLD CREATININE-BSD FMLA CKD-EPI: 59 ML/MIN/1.73 M 2
GLOBULIN SER CALC-MCNC: 3.4 G/DL (ref 1.9–3.5)
GLUCOSE SERPL-MCNC: 109 MG/DL (ref 65–99)
HCT VFR BLD AUTO: 46.6 % (ref 42–52)
HGB BLD-MCNC: 15.2 G/DL (ref 14–18)
IMM GRANULOCYTES # BLD AUTO: 0.01 K/UL (ref 0–0.11)
IMM GRANULOCYTES NFR BLD AUTO: 0.2 % (ref 0–0.9)
LYMPHOCYTES # BLD AUTO: 2.17 K/UL (ref 1–4.8)
LYMPHOCYTES NFR BLD: 34.5 % (ref 22–41)
MCH RBC QN AUTO: 24.9 PG (ref 27–33)
MCHC RBC AUTO-ENTMCNC: 32.6 G/DL (ref 32.3–36.5)
MCV RBC AUTO: 76.3 FL (ref 81.4–97.8)
MONOCYTES # BLD AUTO: 0.82 K/UL (ref 0–0.85)
MONOCYTES NFR BLD AUTO: 13 % (ref 0–13.4)
NEUTROPHILS # BLD AUTO: 3.08 K/UL (ref 1.82–7.42)
NEUTROPHILS NFR BLD: 49 % (ref 44–72)
NRBC # BLD AUTO: 0 K/UL
NRBC BLD-RTO: 0 /100 WBC (ref 0–0.2)
PLATELET # BLD AUTO: 377 K/UL (ref 164–446)
PMV BLD AUTO: 9.1 FL (ref 9–12.9)
POTASSIUM SERPL-SCNC: 4.1 MMOL/L (ref 3.6–5.5)
PROT SERPL-MCNC: 7.5 G/DL (ref 6–8.2)
RBC # BLD AUTO: 6.11 M/UL (ref 4.7–6.1)
SODIUM SERPL-SCNC: 140 MMOL/L (ref 135–145)
WBC # BLD AUTO: 6.3 K/UL (ref 4.8–10.8)

## 2025-04-19 PROCEDURE — 82728 ASSAY OF FERRITIN: CPT

## 2025-04-19 PROCEDURE — 85025 COMPLETE CBC W/AUTO DIFF WBC: CPT

## 2025-04-19 PROCEDURE — 36415 COLL VENOUS BLD VENIPUNCTURE: CPT

## 2025-04-19 PROCEDURE — 80053 COMPREHEN METABOLIC PANEL: CPT

## 2025-04-23 ENCOUNTER — HOSPITAL ENCOUNTER (OUTPATIENT)
Dept: HEMATOLOGY ONCOLOGY | Facility: MEDICAL CENTER | Age: 50
End: 2025-04-23
Attending: INTERNAL MEDICINE
Payer: OTHER GOVERNMENT

## 2025-04-23 DIAGNOSIS — R79.89 HIGH SERUM FERRITIN: ICD-10-CM

## 2025-04-23 PROCEDURE — 99214 OFFICE O/P EST MOD 30 MIN: CPT | Mod: 95 | Performed by: INTERNAL MEDICINE

## 2025-04-23 NOTE — PROGRESS NOTES
PROGRESS NOTE: HEMATOLOGY/ONCOLOGY       Chief Complaint:  Follow up for high ferritin due to oral iron intake and history iron deficiency anemia of unknown cause.      HPI:      Mike Rios is a pleasant 49-year-old white male who has a history of iron deficiency anemia.  He was diagnosed with iron deficiency anemia with a ferritin level of 19 on March 21, 2019 which is diagnostic of iron deficiency anemia.  Patient never had a bleeding and the cause of his iron deficiency anemia on March 2019 is not totally clear. He took oral iron tablet supplementation for about 1 year at the time along with Barley grass which contains iron.  He presented with a ferritin level of 1108 on August 28, 2023 and had a hemochromatosis gene study which showed H63D D heterozygosity. He has been on monthly therapeutic phlebotomies and he had about 6-7 phlebotomies.  Follow-up ferritin level was 111 on September 22, 2024.  Patient had a 7 times of therapeutic phlebotomy and therapeutic phlebotomy was discontinued on October 2024.    He also has a history of Hb E trait when he had a hemoglobin electrophoresis on March 18, 2024.  Hemoglobin electrophoresis showed hemoglobin A level was 71.9 and E level of 24.8.     He comes for routine follow-up today.     ROS:    Constitutional: No weight loss  Skin: No rash or jaundice  HENT: No change in eyesight or hearing  Cardiovascular:No chest pain or arrythmia  Respiratory:No cough or SOB  GI:No nausea, vomiting, diarrhea, constipation  :No dysuria or frequency  Musculoskeletal:No bone or joint pain  Neuro:No sx's of neuropathy  Psych: No complaints    PMH:      Allergies   Allergen Reactions    Pcn [Penicillins] Anaphylaxis       Past Medical History:   Diagnosis Date    Back pain     Central sleep apnea     Depression     FLIP (obstructive sleep apnea)         Past Surgical History:   Procedure Laterality Date    HERNIA REPAIR          Medications:    Current Outpatient Medications on File  Prior to Encounter   Medication Sig Dispense Refill    propranolol (INDERAL) 20 MG Tab TAKE 1 TABLET BY MOUTH TWICE DAILY AS NEEDED FOR ANXIETY 180 Tablet 2    prazosin (MINIPRESS) 2 MG Cap Take 2 Capsules by mouth every evening. 180 Capsule 2    buPROPion (WELLBUTRIN XL) 300 MG XL tablet Take 1 Tablet by mouth every morning. 90 Tablet 3    ramelteon (ROZEREM) 8 MG tablet Take 1 Tablet by mouth every evening. 30 Tablet 6    zolpidem (AMBIEN) 10 MG Tab Take 1 Tablet by mouth at bedtime as needed for Sleep for up to 30 days. 30 Tablet 5    sertraline (ZOLOFT) 100 MG Tab Take 2 Tablets by mouth every day. 180 Tablet 2    omeprazole (PRILOSEC) 20 MG delayed-release capsule TAKE ONE CAPSULE BY MOUTH TWICE DAILY 30 MINUTES PRIOR TO SAME MEAL 180 Capsule 2    finasteride (PROSCAR) 5 MG Tab Take 1 Tablet by mouth every day for 360 days. 90 Tablet 3    cyclobenzaprine (FLEXERIL) 10 mg Tab Take 1 Tablet by mouth 3 times a day as needed for Muscle Spasms. 60 Tablet 5    LORazepam (ATIVAN) 0.5 MG Tab       atorvastatin (LIPITOR) 40 MG Tab TAKE 1 TABLET BY MOUTH EVERY DAY 90 Tablet 3    sumatriptan (IMITREX) 100 MG tablet TAKE 1 TABLET BY MOUTH 1 TIME AS NEEDED FOR MIGRAINE. MAY TAKE SECOND TABLET 2 HOURS AFTER IF STILL AT ONSET OF HEADACHE 9 Tablet 11    sildenafil citrate (VIAGRA) 100 MG tablet TAKE 1 TABLET BY MOUTH EVERY DAY 30 MINUTES BEFORE ACTIVITY 9 Tablet 11    finasteride (PROSCAR) 5 MG Tab TAKE 1 TABLET BY MOUTH EVERY DAY 90 Tablet 3    indomethacin (INDOCIN) 25 MG Cap TAKE 1 CAPSULE BY MOUTH THREE TIMES DAILY FOR 3 DAYS THEN 2 CAPSULES THREE TIMES DAILY FOR 3 DAYS THEN 3 CAPSULES THREE TIMES DAILY FOR 3 DAYS 54 Capsule 0    Hypromell-Glycerin-Naphazoline (CLEAR EYES FOR DRY EYES PLUS) 0.8-0.25-0.012 % Solution Administer 1 Drop into affected eye(s) every four hours as needed (Bilateral). 30 mL 11    DYMISTA 137-50 MCG/ACT Suspension Administer 2 Sprays into affected nostril(S) every day. 23 g 5    albuterol 108 (90  Base) MCG/ACT Aero Soln inhalation aerosol Inhale 2 Puffs every four hours as needed. 18 g 11    naproxen (NAPROSYN) 500 MG Tab TAKE 1 TABLET BY MOUTH TWICE DAILY WITH MEALS 60 Tablet 0     No current facility-administered medications on file prior to encounter.       Social History     Tobacco Use    Smoking status: Never    Smokeless tobacco: Never   Substance Use Topics    Alcohol use: Yes     Alcohol/week: 0.0 oz     Comment: Rare        Family History   Problem Relation Age of Onset    Heart Attack Other     Hypertension Father     Heart Disease Father     Hyperlipidemia Father     Cancer Mother         Lung CA    Psychiatric Illness Neg Hx         Objective    Vitals:    There were no vitals taken for this visit.    Physical Exam:    Appears well-developed and well-nourished. No distress.    Head -  Normocephalic .   Eyes - Pupils are equal. Conjunctivae normal. No scleral icterus.   Ears - normal hearing  Neurological -   Alert and oriented.  Skin - Skin is warm and dry. No rash noted. Not diaphoretic. No erythema. No pallor. No jaundice   Psychiatric -  Normal mood and affect.      Assessment and plan    #1.  History of iron deficiency anemia of unknown cause and high ferritin level due to oral iron supplementation.  He presented with iron deficiency anemia on March 21, 2019 when he had ferritin level of 19. He never had bleeding and the cause of his iron deficiency anemia is not clear.  Regardless, he took oral iron supplementation for almost 1 year which caused his ferritin level goes up to 1108 on August 28, 2023.  He had a total 7 monthly phlebotomy and his follow-up ferritin level is 111 on September 22, 2024 and phlebotomy was discontinued. Patient had a hemochromatosis gene study on August 30, 2023 which showed H63D heterozygous gene mutation which does not cause hemochromatosis.  The cause of his elevated ferritin level is unclear also.    I discussed with him regarding further management and care.  His hemoglobin level is 15.2 and the ferritin level is 246 on April 19, 2025 which is a completely satisfactory.  I will discharge this patient to primary care physician.  I will recommend for the primary care physician to do a follow-up CBC and ferritin level in about 1 year.    #2. Hb E trait  Patient had a hemoglobin electrophoresis which showed hemoglobin E level of 24.8 which is causing low MCV on March 18, 2024.  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    Thank you for allowing me to participate in your patient care.    Huseyin Rome M.D.

## 2025-05-12 DIAGNOSIS — F33.42 RECURRENT MAJOR DEPRESSIVE DISORDER, IN FULL REMISSION (HCC): ICD-10-CM

## 2025-05-12 DIAGNOSIS — F41.1 GAD (GENERALIZED ANXIETY DISORDER): ICD-10-CM

## 2025-05-12 DIAGNOSIS — F43.12 CHRONIC POST-TRAUMATIC STRESS DISORDER (PTSD): ICD-10-CM

## 2025-05-12 RX ORDER — BUSPIRONE HYDROCHLORIDE 10 MG/1
10 TABLET ORAL 2 TIMES DAILY
Qty: 60 TABLET | Refills: 0 | Status: SHIPPED | OUTPATIENT
Start: 2025-05-12

## 2025-05-16 ENCOUNTER — TELEMEDICINE (OUTPATIENT)
Dept: BEHAVIORAL HEALTH | Facility: CLINIC | Age: 50
End: 2025-05-16
Payer: OTHER GOVERNMENT

## 2025-05-16 DIAGNOSIS — F41.1 GAD (GENERALIZED ANXIETY DISORDER): ICD-10-CM

## 2025-05-16 DIAGNOSIS — F43.12 CHRONIC POST-TRAUMATIC STRESS DISORDER (PTSD): ICD-10-CM

## 2025-05-16 DIAGNOSIS — G47.09 OTHER INSOMNIA: ICD-10-CM

## 2025-05-16 DIAGNOSIS — F33.42 RECURRENT MAJOR DEPRESSIVE DISORDER, IN FULL REMISSION (HCC): Primary | ICD-10-CM

## 2025-05-16 PROCEDURE — 90833 PSYTX W PT W E/M 30 MIN: CPT | Mod: 95 | Performed by: PSYCHIATRY & NEUROLOGY

## 2025-05-16 PROCEDURE — 99214 OFFICE O/P EST MOD 30 MIN: CPT | Mod: 95 | Performed by: PSYCHIATRY & NEUROLOGY

## 2025-05-16 NOTE — PROGRESS NOTES
This evaluation was conducted via Teams using secure and encrypted videoconferencing technology. The patient was in their home in the [facility].    The patient's identity was confirmed and verbal consent was obtained for this virtual visit.      PSYCHIATRY FOLLOW-UP NOTE      Name: Mike Rios  MRN: [id number]  : 1975  Age: 50 y.o.  Date of assessment: 2025  PCP: Aamir Shepherd P.A.-C.  Persons in attendance: Patient      REASON FOR VISIT/CHIEF COMPLAINT (as stated by Patient):  Mike Rios is a 50 y.o.,   White male, attending follow-up appointment for mood and anxiety management.      HISTORY OF PRESENT ILLNESS:  Mike Rios is a 50 y.o. old male with MDD, LAKHWINDER, panic attacks and PTSD comes in today for follow up. Patient was last seen 1 month ago, and following treatment planning recommendations were done:  Continue Zoloft 200 mg daily for depression, anxiety and PTSD management.  Continue Buspar 10 mg BID  Continue Wellbutrin  mg daily for depression augmentation.  Continue Prazosin 4 mg at bedtime for PTSD nightmares management.  Continue Propanolol 20 mg BID PRN for morning anxiety. Do not use this medication within 6 hours of prazosin use.  Continue Ambien 10 mg HS PRN for sleep.    Continue Ramelteon 8 mg HS PRN for sleep.  Continue Ativan 0.5 mg HS PRN for severe anxiety or panic attacks only.  Controlled medication treatment agreement sent to signed in 2024.  Patient currently not interested in psychotherapy.    History of Present Illness      He reports experiencing excessive sleepiness, predominantly at night, which he attributes to his medication regimen. He also notes a persistent feeling of fatigue post-work, accompanied by headaches upon awakening. He is currently on a regimen of Ambien and ramelteon 8 mg.  Agreed with plan of trying half tablet of ramelteon tonight and stopping the ramelteon tomorrow night to assess if Ambien alone will help  with sleep.  Overall doing well with the other medication and agreed with not titrating medications further.      PSYCHOTHERAPY ASPECT OF SESSION (16 MIN):  He recounts a recent incident at work involving a colleague with similar mental health issues, which led to a disagreement. He acknowledges his struggle with controlling his emotional responses during such conflicts. He has been dealing with these issues for a long time now. He tries to hold back and not respond. He had a phone call with his director today because they were just talking about that and he was surprised that he is more calm. He is on medication and that is why he is calm.  Session was dedicated to letting patient express his feelings related to recent changes and stressors.  Validation was provided for appropriate emotional responses.  Psychoeducation provided on understanding how specific meanings assigned can contribute to emotional responses and importance of staying mindful of identifying and changing these meanings if needed.  Importance of not discounting the positives was emphasized.  Later part of the session was dedicated to psychoeducation and active listening.      CURRENT MEDICATIONS:  Current Medications[1]    MEDICAL HISTORY  Past Medical History[2]  Past Surgical History[3]      PAST PSYCHIATRIC MEDICATIONS  Sertraline  Bupropion  Prazosin  Quetiapine  Ativan  Ambien      REVIEW OF SYSTEMS:        Constitutional negative   Eyes negative   Ears/Nose/Mouth/Throat  Sleep Apnea on CPAP   Cardiovascular  Hyperlipidemia   Respiratory negative   Gastrointestinal  GERD   Genitourinary negative   Muscular negative   Integumentary negative   Neurological  migraines   Endocrine negative   Hematologic/Lymphatic negative       PHYSICAL EXAMINAION:  Vital signs: There were no vitals taken for this visit.  Musculoskeletal: Normal gait.   Abnormal movements: none      MENTAL STATUS EXAMINATION      General:   - Grooming and hygiene: Casual,   -  Apparent distress: none,   - Behavior: Calm  - Eye Contact:  Good,   - no psychomotor agitation or retardation    - Participation: Active verbal participation  Orientation: Alert and Fully Oriented to person, place and time  Mood: Euthymic  Affect: Flexible,  Thought Process: Logical and Goal-directed  Thought Content: Denies suicidal or homicidal ideations, intent or plan   Perception: Denies auditory or visual hallucinations. No delusions noted   Attention span and concentration: Intact   Speech:Rate within normal limits and Volume within normal limits  Language: Appropriate   Insight: Good  Judgment: Good  Recent and remote memory: No gross evidence of memory deficits        DEPRESSION SCREENING:      3/15/2024    10:20 AM 9/27/2024    11:24 AM 4/15/2025     2:30 PM   Depression Screen (PHQ-2/PHQ-9)   PHQ-2 Total Score  2    PHQ-2 Total Score 0  0   PHQ-9 Total Score  10        Interpretation of PHQ-9 Total Score   Score Severity   1-4 No Depression   5-9 Mild Depression   10-14 Moderate Depression   15-19 Moderately Severe Depression   20-27 Severe Depression    CURRENT RISK:       Suicidal: Low       Homicidal: Low       Self-Harm: Low       Relapse: Low       Crisis Safety Plan Reviewed Not Indicated       If evidence of imminent risk is present, intervention/plan:      MEDICAL RECORDS/LABS/DIAGNOSTIC TESTS REVIEWED:  No new lab since last visit     [place]  records -   Reviewed     PLAN:  (1) MDD; (2) LAKHWINDER; (3) PTSD; (4) Panic attacks   Slow improvement  Continue Zoloft 200 mg daily for depression, anxiety and PTSD management.  Continue Buspar 10 mg BID  Continue Wellbutrin  mg daily for depression augmentation.  Continue Prazosin 4 mg at bedtime for PTSD nightmares management.  Continue Propanolol 20 mg BID PRN for morning anxiety. Do not use this medication within 6 hours of prazosin use.  Continue Ambien 10 mg HS PRN for sleep.    Reduce Ramelteon to 4 mg HS or stop PRN for sleep.  Continue Ativan 0.5  mg HS PRN for severe anxiety or panic attacks only.  Controlled medication treatment agreement sent to signed in Feb 2024.  Patient currently not interested in psychotherapy.  Medication options, alternatives (including no medications) and medication risks/benefits/side effects were discussed in detail.  The patient was advised to call, message provider on MyChart, or come in to the clinic if symptoms worsen or if any future questions/issues regarding their medications arise; the patient verbalized understanding and agreement.    The patient was educated to call 911, call the suicide hotline, or go to local ER if having thoughts of suicide or homicide; verbalized understanding.      Billing Coding based on:  43773 based on [facility]  25321: based on psychotherapy timing    Return to clinic in 1 month or sooner if symptoms worsen.  Next Appointment: instruction provided on how to make the next appointment.     The proposed treatment plan was discussed with the patient who was provided the opportunity to ask questions and make suggestions regarding alternative treatment. Patient verbalized understanding and expressed agreement with the plan.       Paul Lin M.D.  05/16/25    This note was created using voice recognition software (Dragon). The accuracy of the dictation is limited by the abilities of the software. I have reviewed the note prior to signing, however some errors in grammar and context are still possible. If you have any questions related to this note please do not hesitate to contact our office.            [1]   Current Outpatient Medications   Medication Sig Dispense Refill    busPIRone (BUSPAR) 10 MG Tab tablet Take 1 Tablet by mouth 2 times a day. 60 Tablet 0    propranolol (INDERAL) 20 MG Tab TAKE 1 TABLET BY MOUTH TWICE DAILY AS NEEDED FOR ANXIETY 180 Tablet 2    prazosin (MINIPRESS) 2 MG Cap Take 2 Capsules by mouth every evening. 180 Capsule 2    buPROPion (WELLBUTRIN XL) 300 MG XL tablet Take 1  Tablet by mouth every morning. 90 Tablet 3    ramelteon (ROZEREM) 8 MG tablet Take 1 Tablet by mouth every evening. 30 Tablet 6    sertraline (ZOLOFT) 100 MG Tab Take 2 Tablets by mouth every day. 180 Tablet 2    omeprazole (PRILOSEC) 20 MG delayed-release capsule TAKE ONE CAPSULE BY MOUTH TWICE DAILY 30 MINUTES PRIOR TO SAME MEAL 180 Capsule 2    finasteride (PROSCAR) 5 MG Tab Take 1 Tablet by mouth every day for 360 days. 90 Tablet 3    cyclobenzaprine (FLEXERIL) 10 mg Tab Take 1 Tablet by mouth 3 times a day as needed for Muscle Spasms. 60 Tablet 5    LORazepam (ATIVAN) 0.5 MG Tab       atorvastatin (LIPITOR) 40 MG Tab TAKE 1 TABLET BY MOUTH EVERY DAY 90 Tablet 3    sumatriptan (IMITREX) 100 MG tablet TAKE 1 TABLET BY MOUTH 1 TIME AS NEEDED FOR MIGRAINE. MAY TAKE SECOND TABLET 2 HOURS AFTER IF STILL AT ONSET OF HEADACHE 9 Tablet 11    sildenafil citrate (VIAGRA) 100 MG tablet TAKE 1 TABLET BY MOUTH EVERY DAY 30 MINUTES BEFORE ACTIVITY 9 Tablet 11    finasteride (PROSCAR) 5 MG Tab TAKE 1 TABLET BY MOUTH EVERY DAY 90 Tablet 3    indomethacin (INDOCIN) 25 MG Cap TAKE 1 CAPSULE BY MOUTH THREE TIMES DAILY FOR 3 DAYS THEN 2 CAPSULES THREE TIMES DAILY FOR 3 DAYS THEN 3 CAPSULES THREE TIMES DAILY FOR 3 DAYS 54 Capsule 0    Hypromell-Glycerin-Naphazoline (CLEAR EYES FOR DRY EYES PLUS) 0.8-0.25-0.012 % Solution Administer 1 Drop into affected eye(s) every four hours as needed (Bilateral). 30 mL 11    DYMISTA 137-50 MCG/ACT Suspension Administer 2 Sprays into affected nostril(S) every day. 23 g 5    albuterol 108 (90 Base) MCG/ACT Aero Soln inhalation aerosol Inhale 2 Puffs every four hours as needed. 18 g 11    naproxen (NAPROSYN) 500 MG Tab TAKE 1 TABLET BY MOUTH TWICE DAILY WITH MEALS 60 Tablet 0     No current facility-administered medications for this visit.   [2]   Past Medical History:  Diagnosis Date    Back pain     Central sleep apnea     Depression     FLIP (obstructive sleep apnea)    [3]   Past Surgical  History:  Procedure Laterality Date    HERNIA REPAIR

## 2025-06-12 DIAGNOSIS — F43.12 CHRONIC POST-TRAUMATIC STRESS DISORDER (PTSD): ICD-10-CM

## 2025-06-12 DIAGNOSIS — F41.1 GAD (GENERALIZED ANXIETY DISORDER): ICD-10-CM

## 2025-06-12 DIAGNOSIS — N52.2 DRUG-INDUCED ERECTILE DYSFUNCTION: ICD-10-CM

## 2025-06-12 DIAGNOSIS — F33.42 RECURRENT MAJOR DEPRESSIVE DISORDER, IN FULL REMISSION (HCC): ICD-10-CM

## 2025-06-12 DIAGNOSIS — F41.0 PANIC ATTACKS: Primary | ICD-10-CM

## 2025-06-12 RX ORDER — BUSPIRONE HYDROCHLORIDE 10 MG/1
10 TABLET ORAL 2 TIMES DAILY
Qty: 60 TABLET | Refills: 0 | Status: SHIPPED | OUTPATIENT
Start: 2025-06-12 | End: 2025-06-17 | Stop reason: SDUPTHER

## 2025-06-12 RX ORDER — LORAZEPAM 0.5 MG/1
TABLET ORAL
Qty: 15 TABLET | Refills: 0 | Status: SHIPPED | OUTPATIENT
Start: 2025-06-12 | End: 2025-06-17 | Stop reason: SDUPTHER

## 2025-06-13 RX ORDER — SILDENAFIL 100 MG/1
TABLET, FILM COATED ORAL
Qty: 9 TABLET | Refills: 11 | Status: SHIPPED | OUTPATIENT
Start: 2025-06-13 | End: 2025-06-18

## 2025-06-16 ENCOUNTER — APPOINTMENT (OUTPATIENT)
Dept: MEDICAL GROUP | Facility: MEDICAL CENTER | Age: 50
End: 2025-06-16
Payer: OTHER GOVERNMENT

## 2025-06-17 ENCOUNTER — TELEMEDICINE (OUTPATIENT)
Dept: BEHAVIORAL HEALTH | Facility: CLINIC | Age: 50
End: 2025-06-17
Payer: OTHER GOVERNMENT

## 2025-06-17 DIAGNOSIS — R41.840 INATTENTION: Primary | ICD-10-CM

## 2025-06-17 DIAGNOSIS — F41.0 PANIC ATTACKS: ICD-10-CM

## 2025-06-17 DIAGNOSIS — F43.12 CHRONIC POST-TRAUMATIC STRESS DISORDER (PTSD): ICD-10-CM

## 2025-06-17 DIAGNOSIS — F33.42 RECURRENT MAJOR DEPRESSIVE DISORDER, IN FULL REMISSION (HCC): ICD-10-CM

## 2025-06-17 DIAGNOSIS — F41.1 GAD (GENERALIZED ANXIETY DISORDER): ICD-10-CM

## 2025-06-17 PROCEDURE — 99214 OFFICE O/P EST MOD 30 MIN: CPT | Performed by: PSYCHIATRY & NEUROLOGY

## 2025-06-17 RX ORDER — BUSPIRONE HYDROCHLORIDE 10 MG/1
10 TABLET ORAL 2 TIMES DAILY
Qty: 180 TABLET | Refills: 3 | Status: SHIPPED | OUTPATIENT
Start: 2025-06-17

## 2025-06-17 RX ORDER — LORAZEPAM 0.5 MG/1
1 TABLET ORAL
Qty: 15 TABLET | Refills: 0 | Status: SHIPPED | OUTPATIENT
Start: 2025-06-28 | End: 2025-07-13

## 2025-06-17 ASSESSMENT — ANXIETY QUESTIONNAIRES
6. BECOMING EASILY ANNOYED OR IRRITABLE: SEVERAL DAYS
GAD7 TOTAL SCORE: 10
5. BEING SO RESTLESS THAT IT IS HARD TO SIT STILL: SEVERAL DAYS
IF YOU CHECKED OFF ANY PROBLEMS ON THIS QUESTIONNAIRE, HOW DIFFICULT HAVE THESE PROBLEMS MADE IT FOR YOU TO DO YOUR WORK, TAKE CARE OF THINGS AT HOME, OR GET ALONG WITH OTHER PEOPLE: VERY DIFFICULT
2. NOT BEING ABLE TO STOP OR CONTROL WORRYING: MORE THAN HALF THE DAYS
7. FEELING AFRAID AS IF SOMETHING AWFUL MIGHT HAPPEN: SEVERAL DAYS
4. TROUBLE RELAXING: SEVERAL DAYS
3. WORRYING TOO MUCH ABOUT DIFFERENT THINGS: MORE THAN HALF THE DAYS
7. FEELING AFRAID AS IF SOMETHING AWFUL MIGHT HAPPEN: SEVERAL DAYS
2. NOT BEING ABLE TO STOP OR CONTROL WORRYING: MORE THAN HALF THE DAYS
IF YOU CHECKED OFF ANY PROBLEMS ON THIS QUESTIONNAIRE, HOW DIFFICULT HAVE THESE PROBLEMS MADE IT FOR YOU TO DO YOUR WORK, TAKE CARE OF THINGS AT HOME, OR GET ALONG WITH OTHER PEOPLE: VERY DIFFICULT
1. FEELING NERVOUS, ANXIOUS, OR ON EDGE: MORE THAN HALF THE DAYS
5. BEING SO RESTLESS THAT IT IS HARD TO SIT STILL: SEVERAL DAYS
1. FEELING NERVOUS, ANXIOUS, OR ON EDGE: MORE THAN HALF THE DAYS
3. WORRYING TOO MUCH ABOUT DIFFERENT THINGS: MORE THAN HALF THE DAYS
4. TROUBLE RELAXING: SEVERAL DAYS
6. BECOMING EASILY ANNOYED OR IRRITABLE: SEVERAL DAYS

## 2025-06-17 ASSESSMENT — PATIENT HEALTH QUESTIONNAIRE - PHQ9
5. POOR APPETITE OR OVEREATING: 1
8. MOVING OR SPEAKING SO SLOWLY THAT OTHER PEOPLE COULD HAVE NOTICED. OR THE OPPOSITE, BEING SO FIGETY OR RESTLESS THAT YOU HAVE BEEN MOVING AROUND A LOT MORE THAN USUAL: 1
8. MOVING OR SPEAKING SO SLOWLY THAT OTHER PEOPLE COULD HAVE NOTICED. OR THE OPPOSITE, BEING SO FIGETY OR RESTLESS THAT YOU HAVE BEEN MOVING AROUND A LOT MORE THAN USUAL: SEVERAL DAYS
2. FEELING DOWN, DEPRESSED, IRRITABLE, OR HOPELESS: SEVERAL DAYS
9. THOUGHTS THAT YOU WOULD BE BETTER OFF DEAD, OR OF HURTING YOURSELF: NOT AT ALL
2. FEELING DOWN, DEPRESSED, IRRITABLE, OR HOPELESS: 1
4. FEELING TIRED OR HAVING LITTLE ENERGY: MORE THAN HALF THE DAYS
6. FEELING BAD ABOUT YOURSELF - OR THAT YOU ARE A FAILURE OR HAVE LET YOURSELF OR YOUR FAMILY DOWN: SEVERAL DAYS
7. TROUBLE CONCENTRATING ON THINGS, SUCH AS READING THE NEWSPAPER OR WATCHING TELEVISION: 3
6. FEELING BAD ABOUT YOURSELF - OR THAT YOU ARE A FAILURE OR HAVE LET YOURSELF OR YOUR FAMILY DOWN: 1
10. IF YOU CHECKED OFF ANY PROBLEMS, HOW DIFFICULT HAVE THESE PROBLEMS MADE IT FOR YOU TO DO YOUR WORK, TAKE CARE OF THINGS AT HOME, OR GET ALONG WITH OTHER PEOPLE: VERY DIFFICULT
CLINICAL INTERPRETATION OF PHQ2 SCORE: 0
5. POOR APPETITE OR OVEREATING: SEVERAL DAYS
5. POOR APPETITE OR OVEREATING: 1 - SEVERAL DAYS
1. LITTLE INTEREST OR PLEASURE IN DOING THINGS: SEVERAL DAYS
3. TROUBLE FALLING OR STAYING ASLEEP OR SLEEPING TOO MUCH: 1
7. TROUBLE CONCENTRATING ON THINGS, SUCH AS READING THE NEWSPAPER OR WATCHING TELEVISION: NEARLY EVERY DAY
SUM OF ALL RESPONSES TO PHQ QUESTIONS 1-9: 11
1. LITTLE INTEREST OR PLEASURE IN DOING THINGS: 1
3. TROUBLE FALLING OR STAYING ASLEEP OR SLEEPING TOO MUCH: SEVERAL DAYS
9. THOUGHTS THAT YOU WOULD BE BETTER OFF DEAD, OR OF HURTING YOURSELF: 0
4. FEELING TIRED OR HAVING LITTLE ENERGY: 2

## 2025-06-17 NOTE — PROGRESS NOTES
This evaluation was conducted via Teams using secure and encrypted videoconferencing technology. The patient was in their home in the [facility].    The patient's identity was confirmed and verbal consent was obtained for this virtual visit.      PSYCHIATRY FOLLOW-UP NOTE      Name: Mike Rios  MRN: [id number]  : 1975  Age: 50 y.o.  Date of assessment: 2025  PCP: Aamir Shepherd P.A.-C.  Persons in attendance: Patient      REASON FOR VISIT/CHIEF COMPLAINT (as stated by Patient):  Mike Rios is a 50 y.o.,   White male, attending follow-up appointment for mood and anxiety management.      HISTORY OF PRESENT ILLNESS:  Mike Rios is a 50 y.o. old male with MDD, LAKHWINDER, PTSD and panic attacks comes in today for follow up. Patient was last seen 1 month ago, and following treatment planning recommendations were done:  Continue Zoloft 200 mg daily for depression, anxiety and PTSD management.  Continue Buspar 10 mg BID  Continue Wellbutrin  mg daily for depression augmentation.  Continue Prazosin 4 mg at bedtime for PTSD nightmares management.  Continue Propanolol 20 mg BID PRN for morning anxiety. Do not use this medication within 6 hours of prazosin use.  Continue Ambien 10 mg HS PRN for sleep.    Reduce Ramelteon to 4 mg HS or stop PRN for sleep.  Continue Ativan 0.5 mg HS PRN for severe anxiety or panic attacks only.  Controlled medication treatment agreement sent to signed in 2024.  Patient currently not interested in psychotherapy.    Patient is compliant with addition of BuSpar with good response for anxiety and no side effect.  He has reduced ramelteon to half tablet and taking that with Ambien with good response and no morning grogginess.  Patient has The need for Ativan minimal withusing Xanax once a week if needed mainly to deal with social situations.  Overall he is doing well and agreed with plan of not titrating medications further.  Patient continues to  struggle with inattention and motivation with episodes of distractibility.  Discussed how underlying PTSD, depression and anxiety can contribute to the symptoms as well.  Patient is interested in getting psychological testing done for further evaluation of inattention to assess if he meets criteria for ADHD.        CURRENT MEDICATIONS:  Current Medications[1]    MEDICAL HISTORY  Past Medical History[2]  Past Surgical History[3]      PAST PSYCHIATRIC MEDICATIONS  Sertraline  Bupropion  Prazosin  Quetiapine  Ativan  Ambien      REVIEW OF SYSTEMS:        Constitutional negative   Eyes negative   Ears/Nose/Mouth/Throat  Sleep Apnea on CPAP   Cardiovascular  Hyperlipidemia   Respiratory negative   Gastrointestinal  GERD   Genitourinary negative   Muscular negative   Integumentary negative   Neurological  migraines   Endocrine negative   Hematologic/Lymphatic negative       PHYSICAL EXAMINAION:  Vital signs: There were no vitals taken for this visit.  Musculoskeletal: Normal gait.   Abnormal movements: none      MENTAL STATUS EXAMINATION      General:   - Grooming and hygiene: Casual,   - Apparent distress: none,   - Behavior: Calm  - Eye Contact:  Good,   - no psychomotor agitation or retardation    - Participation: Active verbal participation  Orientation: Alert and Fully Oriented to person, place and time  Mood: Anxious  Affect: Flexible and Full range,  Thought Process: Logical and Goal-directed  Thought Content: Denies suicidal or homicidal ideations, intent or plan   Perception: Denies auditory or visual hallucinations. No delusions noted   Attention span and concentration: Intact   Speech:Rate within normal limits and Volume within normal limits  Language: Appropriate   Insight: Good  Judgment: Good  Recent and remote memory: No gross evidence of memory deficits        DEPRESSION SCREENIN/27/2024    11:24 AM 4/15/2025     2:30 PM 2025     3:30 PM   Depression Screen (PHQ-2/PHQ-9)   PHQ-2 Total Score 2      PHQ-2 Total Score  0    PHQ-9 Total Score 10     PHQ-9 Total Score   11       Interpretation of PHQ-9 Total Score   Score Severity   1-4 No Depression   5-9 Mild Depression   10-14 Moderate Depression   15-19 Moderately Severe Depression   20-27 Severe Depression    CURRENT RISK:       Suicidal: Low       Homicidal: Low       Self-Harm: Low       Relapse: Low       Crisis Safety Plan Reviewed Not Indicated       If evidence of imminent risk is present, intervention/plan:      MEDICAL RECORDS/LABS/DIAGNOSTIC TESTS REVIEWED:  No new lab since last visit     [place]  records -   Reviewed     PLAN:  (1) MDD; (2) LAKHWINDER; (3) PTSD; (4) Panic attacks; (5) Inattention  Slow improvement. PHQ9: 11, GAD7: 10  Continue Zoloft 200 mg daily for depression, anxiety and PTSD management.  Continue Buspar 10 mg BID  Continue Wellbutrin  mg daily for depression augmentation.  Continue Prazosin 4 mg at bedtime for PTSD nightmares management.  Continue Propanolol 20 mg BID PRN for morning anxiety. Do not use this medication within 6 hours of prazosin use.  Continue Ambien 10 mg HS PRN for sleep.    Continue reduced Ramelteon 4 mg HS or stop PRN for sleep.  Continue Ativan 0.5 mg HS PRN for severe anxiety or panic attacks only.  Referral placed for psychological testing for inattention evaluation: ADHD vs PTSD/MDD/LAKHWINDER induced inattention  Controlled medication treatment agreement sent to signed in Feb 2024.  Patient currently not interested in psychotherapy.  Medication options, alternatives (including no medications) and medication risks/benefits/side effects were discussed in detail.  The patient was advised to call, message provider on [facility], or come in to the clinic if symptoms worsen or if any future questions/issues regarding their medications arise; the patient verbalized understanding and agreement.    The patient was educated to call 911, call the suicide hotline, or go to local ER if having thoughts of suicide or  homicide; verbalized understanding.      Billing Coding based on:  28610 based on MDM    Return to clinic in 1 month or sooner if symptoms worsen.  Next Appointment: instruction provided on how to make the next appointment.     The proposed treatment plan was discussed with the patient who was provided the opportunity to ask questions and make suggestions regarding alternative treatment. Patient verbalized understanding and expressed agreement with the plan.       Paul Lin M.D.  06/17/25    This note was created using voice recognition software (Dragon). The accuracy of the dictation is limited by the abilities of the software. I have reviewed the note prior to signing, however some errors in grammar and context are still possible. If you have any questions related to this note please do not hesitate to contact our office.            [1]   Current Outpatient Medications   Medication Sig Dispense Refill    sildenafil citrate (VIAGRA) 100 MG tablet TAKE 1 TABLET BY MOUTH EVERY DAY 30 MINUTES BEFORE ACTIVITY 9 Tablet 11    busPIRone (BUSPAR) 10 MG Tab tablet TAKE 1 TABLET BY MOUTH TWICE DAILY 60 Tablet 0    LORazepam (ATIVAN) 0.5 MG Tab TAKE 1 TABLET BY MOUTH DAILY AS NEEDED FOR PANIC ATTACKS 15 Tablet 0    propranolol (INDERAL) 20 MG Tab TAKE 1 TABLET BY MOUTH TWICE DAILY AS NEEDED FOR ANXIETY 180 Tablet 2    prazosin (MINIPRESS) 2 MG Cap Take 2 Capsules by mouth every evening. 180 Capsule 2    buPROPion (WELLBUTRIN XL) 300 MG XL tablet Take 1 Tablet by mouth every morning. 90 Tablet 3    ramelteon (ROZEREM) 8 MG tablet Take 1 Tablet by mouth every evening. 30 Tablet 6    sertraline (ZOLOFT) 100 MG Tab Take 2 Tablets by mouth every day. 180 Tablet 2    omeprazole (PRILOSEC) 20 MG delayed-release capsule TAKE ONE CAPSULE BY MOUTH TWICE DAILY 30 MINUTES PRIOR TO SAME MEAL 180 Capsule 2    finasteride (PROSCAR) 5 MG Tab Take 1 Tablet by mouth every day for 360 days. 90 Tablet 3    cyclobenzaprine (FLEXERIL) 10 mg  Tab Take 1 Tablet by mouth 3 times a day as needed for Muscle Spasms. 60 Tablet 5    atorvastatin (LIPITOR) 40 MG Tab TAKE 1 TABLET BY MOUTH EVERY DAY 90 Tablet 3    sumatriptan (IMITREX) 100 MG tablet TAKE 1 TABLET BY MOUTH 1 TIME AS NEEDED FOR MIGRAINE. MAY TAKE SECOND TABLET 2 HOURS AFTER IF STILL AT ONSET OF HEADACHE 9 Tablet 11    finasteride (PROSCAR) 5 MG Tab TAKE 1 TABLET BY MOUTH EVERY DAY 90 Tablet 3    indomethacin (INDOCIN) 25 MG Cap TAKE 1 CAPSULE BY MOUTH THREE TIMES DAILY FOR 3 DAYS THEN 2 CAPSULES THREE TIMES DAILY FOR 3 DAYS THEN 3 CAPSULES THREE TIMES DAILY FOR 3 DAYS 54 Capsule 0    Hypromell-Glycerin-Naphazoline (CLEAR EYES FOR DRY EYES PLUS) 0.8-0.25-0.012 % Solution Administer 1 Drop into affected eye(s) every four hours as needed (Bilateral). 30 mL 11    DYMISTA 137-50 MCG/ACT Suspension Administer 2 Sprays into affected nostril(S) every day. 23 g 5    albuterol 108 (90 Base) MCG/ACT Aero Soln inhalation aerosol Inhale 2 Puffs every four hours as needed. 18 g 11    naproxen (NAPROSYN) 500 MG Tab TAKE 1 TABLET BY MOUTH TWICE DAILY WITH MEALS 60 Tablet 0     No current facility-administered medications for this visit.   [2]   Past Medical History:  Diagnosis Date    Back pain     Central sleep apnea     Depression     FLIP (obstructive sleep apnea)    [3]   Past Surgical History:  Procedure Laterality Date    HERNIA REPAIR

## 2025-06-18 ENCOUNTER — TELEMEDICINE (OUTPATIENT)
Dept: MEDICAL GROUP | Facility: MEDICAL CENTER | Age: 50
End: 2025-06-18
Payer: OTHER GOVERNMENT

## 2025-06-18 VITALS — HEIGHT: 73 IN | BODY MASS INDEX: 27.17 KG/M2 | WEIGHT: 205 LBS

## 2025-06-18 DIAGNOSIS — R79.89 HIGH SERUM FERRITIN: ICD-10-CM

## 2025-06-18 DIAGNOSIS — Z00.00 PREVENTATIVE HEALTH CARE: ICD-10-CM

## 2025-06-18 DIAGNOSIS — L65.9 HAIR LOSS: ICD-10-CM

## 2025-06-18 DIAGNOSIS — J30.89 CHRONIC NON-SEASONAL ALLERGIC RHINITIS: ICD-10-CM

## 2025-06-18 DIAGNOSIS — F33.42 RECURRENT MAJOR DEPRESSIVE DISORDER, IN FULL REMISSION (HCC): ICD-10-CM

## 2025-06-18 DIAGNOSIS — N52.2 DRUG-INDUCED ERECTILE DYSFUNCTION: ICD-10-CM

## 2025-06-18 DIAGNOSIS — F41.1 GAD (GENERALIZED ANXIETY DISORDER): ICD-10-CM

## 2025-06-18 DIAGNOSIS — E78.2 MIXED HYPERLIPIDEMIA: ICD-10-CM

## 2025-06-18 PROBLEM — E83.110 HEREDITARY HEMOCHROMATOSIS (HCC): Status: RESOLVED | Noted: 2024-03-13 | Resolved: 2025-06-18

## 2025-06-18 PROCEDURE — 99214 OFFICE O/P EST MOD 30 MIN: CPT | Mod: 95 | Performed by: PHYSICIAN ASSISTANT

## 2025-06-18 RX ORDER — SILDENAFIL 100 MG/1
100 TABLET, FILM COATED ORAL
Qty: 10 TABLET | Refills: 11 | Status: SHIPPED | OUTPATIENT
Start: 2025-06-18 | End: 2025-07-18

## 2025-06-18 RX ORDER — FLUTICASONE PROPIONATE 50 MCG
1 SPRAY, SUSPENSION (ML) NASAL 2 TIMES DAILY
Qty: 48 G | Refills: 3 | Status: SHIPPED | OUTPATIENT
Start: 2025-06-18 | End: 2025-06-18

## 2025-06-18 RX ORDER — FINASTERIDE 5 MG/1
5 TABLET, FILM COATED ORAL DAILY
Qty: 90 TABLET | Refills: 3 | Status: SHIPPED | OUTPATIENT
Start: 2025-06-18 | End: 2026-06-13

## 2025-06-18 ASSESSMENT — FIBROSIS 4 INDEX: FIB4 SCORE: 0.61

## 2025-06-18 NOTE — PROGRESS NOTES
Subjective:     History of Present Illness  The patient presents via virtual visit for evaluation of anxiety, depression, allergies, erectile dysfunction, hair loss, and elevated ferritin level.    This evaluation was conducted via Teams using secure and encrypted videoconferencing technology. The patient was in their home in the [facility].    The patient's identity was confirmed and verbal consent was obtained for this virtual visit.      He reports that his current medication regimen has been effective in managing his anxiety and depression. He has been on BuSpar 10 mg twice daily for the past 3 weeks, with no reported side effects. He had a consultation with Dr. Lin yesterday, during which they discussed his medications. He is currently taking ramelteon 4 mg at night, which he finds beneficial. He also takes prazosin, Wellbutrin, and Zoloft 200 mg. Lorazepam is used as needed. He has discontinued mirtazapine due to its sedative effects, which persist into the following day.    His allergies remain unchanged, with symptoms primarily triggered by outdoor exposure. He recalls an incident where he felt a foreign body sensation in his nose after pulling weeds, which was followed by sneezing for 3 days. He has previously used Flonase and is considering resuming it. He also reports a persistent need to clear his throat.    He continues to take finasteride for hair loss, which he finds helpful. He has one refill remaining.    He recently had an eye examination and received a new prescription, which he expects to receive in a few weeks. He is experiencing difficulty with his vision.    He is uncertain about the number of refills remaining for his sildenafil prescription.    He is due for his shingles vaccine and plans to receive it at [facility].    He is inquiring about the need for blood work, as his ferritin levels have been increasing. He has been informed that he does not have hemochromatosis, but he remains  "concerned about his ferritin levels and wishes to monitor them regularly. He is considering therapeutic phlebotomy every quarter, depending on his ferritin levels.      Current medicines (including changes today)  Current Medications[1]  He  has a past medical history of Back pain, Central sleep apnea, Depression, and FLIP (obstructive sleep apnea).    He has no past medical history of ASTHMA, Diabetes, Seizure (HCC), or Ulcer.    ROS   No chest pain, no shortness of breath, no abdominal pain  Positive ROS as per HPI.  All other systems reviewed and are negative.     Objective:     Ht 1.854 m (6' 1\")   Wt 93 kg (205 lb)  Body mass index is 27.05 kg/m².   Physical Exam    Constitutional: Alert, no distress.  Skin: Warm, dry, good turgor, no rashes in visible areas.  Eye: Equal, round and reactive, conjunctiva clear, lids normal.  ENMT: Lips without lesions, good dentition, oropharynx clear.  Neck: Trachea midline, no masses, no thyromegaly.   Psych: Alert and oriented x3, normal affect and mood.      Results  Labs   - Ferritin level: 04/2025, Trending up higher        Assessment and Plan:   The following treatment plan was discussed    Assessment & Plan  1. Anxiety.  Chronic condition.  - Currently on multiple medications including ramelteon 4 mg (half of 8 mg), prazosin, buspirone 10 mg twice a day, Wellbutrin, lorazepam as needed, and Zoloft 200 mg.  - Reports that the medications are helping with his anxiety.  - No issues or side effects reported with the current medication regimen.  - Will continue with the current medication regimen.    2. Depression.  Chronic condition.  - Reports that his current medications are helping with his depression.  - No issues or side effects reported with the current medication regimen.  - Will continue with the current medication regimen.    3. Allergic rhinitis.  - Experiences allergic reactions when outside, especially when pulling weeds.  - Increased sneezing and nasal " discomfort reported.  - Continue Dymista as directed.  - Instructions on proper nasal spray usage given to prevent nosebleeds and ensure effective delivery of the medication.    4. Hair loss.  - Currently taking finasteride and reports it is helping.  - Refill for finasteride has been sent to pharmacy.    5. Erectile dysfunction.  - Prescription for sildenafil 100 mg, 9 tablets, provided and sent to pharmacy.    6. Health maintenance.  - Advised to receive the Shingrix vaccine at pharmacy. Potential side effects discussed, advised to take ibuprofen post-vaccination to manage any side effects.  - Preventative labs including complete blood count, liver function, kidney function, cholesterol, A1c, and ferritin levels ordered.    7. Elevated ferritin levels.  - Ferritin levels checked in 04/2025 and were trending up.  - Advised to monitor ferritin levels regularly.  - If levels are elevated, may consider therapeutic phlebotomy or donating blood.    Addendum: Sent patient message as Flonase is part of Dymista.  Can use both medications.  Also asked him in another follow-up message to be sure to fast 8 hours for his labs.    ORDERS:  1. LAKHWINDER (generalized anxiety disorder)      2. Recurrent major depressive disorder, in full remission (HCC)      3. Chronic non-seasonal allergic rhinitis    - fluticasone (FLONASE) 50 MCG/ACT nasal spray; Administer 1 Spray into affected nostril(S) 2 times a day for 90 days.  Dispense: 48 g; Refill: 3    4. Hair loss    - finasteride (PROSCAR) 5 MG Tab; Take 1 Tablet by mouth every day for 360 days.  Dispense: 90 Tablet; Refill: 3    5. Drug-induced erectile dysfunction    - sildenafil citrate (VIAGRA) 100 MG tablet; Take 1 Tablet by mouth 1 time a day as needed for Erectile Dysfunction for up to 30 days. 1/2 hour prior to activity.  Dispense: 10 Tablet; Refill: 11    6. Mixed hyperlipidemia      7. High serum ferritin    - FERRITIN; Future    8. Preventative health care    - CBC WITHOUT  DIFFERENTIAL; Future  - Comp Metabolic Panel; Future  - HEMOGLOBIN A1C; Future  - Lipid Profile; Future  - FERRITIN; Future    () Today's E/M visit is associated with medical care services that serve as the continuing focal point for all needed health care services and/or with medical care services that are part of ongoing care related to a patient's single, serious condition or a complex condition: This includes furnishing services to patients on an ongoing basis that result in care that is personalized to the patient. The services result in a comprehensive, longitudinal, and continuous relationship with the patient and involve delivery of team-based care that is accessible, coordinated with other practitioners and providers, and integrated with the broader health care landscape.      Please note that this dictation was created using voice recognition software. I have made every reasonable attempt to correct obvious errors, but I expect that there are errors of grammar and possibly content that I did not discover before finalizing the note.      Attestation      Verbal consent was acquired by the patient to use [employer] ambient listening note generation during this visit Yes                  [1]   Current Outpatient Medications   Medication Sig Dispense Refill    fluticasone (FLONASE) 50 MCG/ACT nasal spray Administer 1 Spray into affected nostril(S) 2 times a day for 90 days. 48 g 3    finasteride (PROSCAR) 5 MG Tab Take 1 Tablet by mouth every day for 360 days. 90 Tablet 3    sildenafil citrate (VIAGRA) 100 MG tablet Take 1 Tablet by mouth 1 time a day as needed for Erectile Dysfunction for up to 30 days. 1/2 hour prior to activity. 10 Tablet 11    busPIRone (BUSPAR) 10 MG Tab tablet Take 1 Tablet by mouth 2 times a day. 180 Tablet 3    [START ON 6/28/2025] LORazepam (ATIVAN) 0.5 MG Tab Take 2 Tablets by mouth 1 time a day as needed for Anxiety for up to 15 days. 15 Tablet 0    propranolol (INDERAL) 20 MG  Tab TAKE 1 TABLET BY MOUTH TWICE DAILY AS NEEDED FOR ANXIETY 180 Tablet 2    prazosin (MINIPRESS) 2 MG Cap Take 2 Capsules by mouth every evening. 180 Capsule 2    buPROPion (WELLBUTRIN XL) 300 MG XL tablet Take 1 Tablet by mouth every morning. 90 Tablet 3    ramelteon (ROZEREM) 8 MG tablet Take 1 Tablet by mouth every evening. 30 Tablet 6    sertraline (ZOLOFT) 100 MG Tab Take 2 Tablets by mouth every day. 180 Tablet 2    omeprazole (PRILOSEC) 20 MG delayed-release capsule TAKE ONE CAPSULE BY MOUTH TWICE DAILY 30 MINUTES PRIOR TO SAME MEAL 180 Capsule 2    cyclobenzaprine (FLEXERIL) 10 mg Tab Take 1 Tablet by mouth 3 times a day as needed for Muscle Spasms. 60 Tablet 5    atorvastatin (LIPITOR) 40 MG Tab TAKE 1 TABLET BY MOUTH EVERY DAY 90 Tablet 3    sumatriptan (IMITREX) 100 MG tablet TAKE 1 TABLET BY MOUTH 1 TIME AS NEEDED FOR MIGRAINE. MAY TAKE SECOND TABLET 2 HOURS AFTER IF STILL AT ONSET OF HEADACHE 9 Tablet 11    finasteride (PROSCAR) 5 MG Tab TAKE 1 TABLET BY MOUTH EVERY DAY 90 Tablet 3    indomethacin (INDOCIN) 25 MG Cap TAKE 1 CAPSULE BY MOUTH THREE TIMES DAILY FOR 3 DAYS THEN 2 CAPSULES THREE TIMES DAILY FOR 3 DAYS THEN 3 CAPSULES THREE TIMES DAILY FOR 3 DAYS 54 Capsule 0    Hypromell-Glycerin-Naphazoline (CLEAR EYES FOR DRY EYES PLUS) 0.8-0.25-0.012 % Solution Administer 1 Drop into affected eye(s) every four hours as needed (Bilateral). 30 mL 11    DYMISTA 137-50 MCG/ACT Suspension Administer 2 Sprays into affected nostril(S) every day. 23 g 5    albuterol 108 (90 Base) MCG/ACT Aero Soln inhalation aerosol Inhale 2 Puffs every four hours as needed. 18 g 11    naproxen (NAPROSYN) 500 MG Tab TAKE 1 TABLET BY MOUTH TWICE DAILY WITH MEALS 60 Tablet 0     No current facility-administered medications for this visit.

## 2025-07-18 ENCOUNTER — TELEMEDICINE (OUTPATIENT)
Dept: BEHAVIORAL HEALTH | Facility: CLINIC | Age: 50
End: 2025-07-18
Payer: OTHER GOVERNMENT

## 2025-07-18 DIAGNOSIS — F43.12 CHRONIC POST-TRAUMATIC STRESS DISORDER (PTSD): ICD-10-CM

## 2025-07-18 DIAGNOSIS — F41.1 GAD (GENERALIZED ANXIETY DISORDER): ICD-10-CM

## 2025-07-18 DIAGNOSIS — F33.42 RECURRENT MAJOR DEPRESSIVE DISORDER, IN FULL REMISSION (HCC): Primary | ICD-10-CM

## 2025-07-18 PROCEDURE — 99214 OFFICE O/P EST MOD 30 MIN: CPT | Mod: 95 | Performed by: PSYCHIATRY & NEUROLOGY

## 2025-07-18 NOTE — PROGRESS NOTES
This evaluation was conducted via Teams using secure and encrypted videoconferencing technology. The patient was in their home in the [facility].    The patient's identity was confirmed and verbal consent was obtained for this virtual visit.      PSYCHIATRY FOLLOW-UP NOTE      Name: Mike Rios  MRN: [id number]  : 1975  Age: 50 y.o.  Date of assessment: 2025  PCP: Aamir Shepherd P.A.-C.  Persons in attendance: Patient      REASON FOR VISIT/CHIEF COMPLAINT (as stated by Patient):  Mike Rios is a 50 y.o.,   White male, attending follow-up appointment for mood and anxiety management.      HISTORY OF PRESENT ILLNESS:  Mike Rios is a 50 y.o. old male with MDD, LAKHWINDER< PTSD, panic attacks and inattention comes in today for follow up. Patient was last seen 1 month ago, and following treatment planning recommendations were done:  Continue Zoloft 200 mg daily for depression, anxiety and PTSD management.  Continue Buspar 10 mg BID  Continue Wellbutrin  mg daily for depression augmentation.  Continue Prazosin 4 mg at bedtime for PTSD nightmares management.  Continue Propanolol 20 mg BID PRN for morning anxiety. Do not use this medication within 6 hours of prazosin use.  Continue Ambien 10 mg HS PRN for sleep.    Continue reduced Ramelteon 4 mg HS or stop PRN for sleep.  Continue Ativan 0.5 mg HS PRN for severe anxiety or panic attacks only.  Referral placed for psychological testing for inattention evaluation: ADHD vs PTSD/MDD/LAKHWINDER induced inattention  Controlled medication treatment agreement sent to signed in 2024.  Patient currently not interested in psychotherapy.    History of Present Illness    He reports an improvement in his condition with the current medication regimen, which he tolerates well without any side effects.   He continues to take half a tablet of ramelteon for sleep, which he finds effective. He does not experience any morning grogginess or falls and  feels well-rested upon waking.   He notes that he becomes irritable when he forgets to take his medication.   His work situation is stable at present. He does not require any new prescriptions at this time.  Patient is awaiting appointment for psychological testing.        CURRENT MEDICATIONS:  Current Medications[1]    MEDICAL HISTORY  Past Medical History[2]  Past Surgical History[3]      PAST PSYCHIATRIC MEDICATIONS  Sertraline  Bupropion  Prazosin  Quetiapine  Ativan  Ambien      REVIEW OF SYSTEMS:        Constitutional negative   Eyes negative   Ears/Nose/Mouth/Throat  Sleep Apnea on CPAP   Cardiovascular  Hyperlipidemia   Respiratory negative   Gastrointestinal  GERD   Genitourinary negative   Muscular negative   Integumentary negative   Neurological  migraines   Endocrine negative   Hematologic/Lymphatic negative     PHYSICAL EXAMINAION:  Vital signs: There were no vitals taken for this visit.  Musculoskeletal: Normal gait.   Abnormal movements: none      MENTAL STATUS EXAMINATION      General:   - Grooming and hygiene: Casual,   - Apparent distress: none,   - Behavior: Calm  - Eye Contact:  Good,   - no psychomotor agitation or retardation    - Participation: Active verbal participation  Orientation: Alert and Fully Oriented to person, place and time  Mood: Euthymic  Affect: Flexible and Full range,  Thought Process: Logical and Goal-directed  Thought Content: Denies suicidal or homicidal ideations, intent or plan   Perception: Denies auditory or visual hallucinations. No delusions noted   Attention span and concentration: Intact   Speech:Rate within normal limits and Volume within normal limits  Language: Appropriate   Insight: Good  Judgment: Good  Recent and remote memory: No gross evidence of memory deficits        DEPRESSION SCREENIN/27/2024    11:24 AM 4/15/2025     2:30 PM 2025     3:30 PM   Depression Screen (PHQ-2/PHQ-9)   PHQ-2 Total Score 2     PHQ-2 Total Score  0 0   PHQ-9 Total  Score 10         Interpretation of PHQ-9 Total Score   Score Severity   1-4 No Depression   5-9 Mild Depression   10-14 Moderate Depression   15-19 Moderately Severe Depression   20-27 Severe Depression    CURRENT RISK:       Suicidal: Low       Homicidal: Low       Self-Harm: Low       Relapse: Low       Crisis Safety Plan Reviewed Not Indicated       If evidence of imminent risk is present, intervention/plan:      MEDICAL RECORDS/LABS/DIAGNOSTIC TESTS REVIEWED:  No new lab since last visit     [place]  records -   Reviewed     PLAN:  (1) MDD; (2) LAKHWINDER; (3) PTSD; (4) Panic attacks; (5) Inattention  Slow improvement. PHQ9: 11, GAD7: 10  Continue Zoloft 200 mg daily for depression, anxiety and PTSD management.  Continue Buspar 10 mg BID  Continue Wellbutrin  mg daily for depression augmentation.  Continue Prazosin 4 mg at bedtime for PTSD nightmares management.  Continue Propanolol 20 mg BID PRN for morning anxiety. Do not use this medication within 6 hours of prazosin use.  Continue Ambien 10 mg HS PRN for sleep.    Continue reduced Ramelteon 4 mg HS or stop PRN for sleep.  Continue Ativan 0.5 mg HS PRN for severe anxiety or panic attacks only.  Referral placed for psychological testing for inattention evaluation: ADHD vs PTSD/MDD/LAKHWINDER induced inattention  Controlled medication treatment agreement sent to signed in Feb 2024.  Patient currently not interested in psychotherapy.  Medication options, alternatives (including no medications) and medication risks/benefits/side effects were discussed in detail.  The patient was advised to call, message provider on [facility], or come in to the clinic if symptoms worsen or if any future questions/issues regarding their medications arise; the patient verbalized understanding and agreement.    The patient was educated to call 911, call the suicide hotline, or go to local ER if having thoughts of suicide or homicide; verbalized understanding.      Billing Coding based on:  97492  based on MDM    Return to clinic in 1 month or sooner if symptoms worsen.  Next Appointment: instruction provided on how to make the next appointment.     The proposed treatment plan was discussed with the patient who was provided the opportunity to ask questions and make suggestions regarding alternative treatment. Patient verbalized understanding and expressed agreement with the plan.       Paul Lin M.D.  07/18/25    This note was created using voice recognition software (Dragon). The accuracy of the dictation is limited by the abilities of the software. I have reviewed the note prior to signing, however some errors in grammar and context are still possible. If you have any questions related to this note please do not hesitate to contact our office.            [1]   Current Outpatient Medications   Medication Sig Dispense Refill    finasteride (PROSCAR) 5 MG Tab Take 1 Tablet by mouth every day for 360 days. 90 Tablet 3    sildenafil citrate (VIAGRA) 100 MG tablet Take 1 Tablet by mouth 1 time a day as needed for Erectile Dysfunction for up to 30 days. 1/2 hour prior to activity. 10 Tablet 11    busPIRone (BUSPAR) 10 MG Tab tablet Take 1 Tablet by mouth 2 times a day. 180 Tablet 3    propranolol (INDERAL) 20 MG Tab TAKE 1 TABLET BY MOUTH TWICE DAILY AS NEEDED FOR ANXIETY 180 Tablet 2    prazosin (MINIPRESS) 2 MG Cap Take 2 Capsules by mouth every evening. 180 Capsule 2    buPROPion (WELLBUTRIN XL) 300 MG XL tablet Take 1 Tablet by mouth every morning. 90 Tablet 3    ramelteon (ROZEREM) 8 MG tablet Take 1 Tablet by mouth every evening. 30 Tablet 6    sertraline (ZOLOFT) 100 MG Tab Take 2 Tablets by mouth every day. 180 Tablet 2    omeprazole (PRILOSEC) 20 MG delayed-release capsule TAKE ONE CAPSULE BY MOUTH TWICE DAILY 30 MINUTES PRIOR TO SAME MEAL 180 Capsule 2    cyclobenzaprine (FLEXERIL) 10 mg Tab Take 1 Tablet by mouth 3 times a day as needed for Muscle Spasms. 60 Tablet 5    atorvastatin (LIPITOR) 40  MG Tab TAKE 1 TABLET BY MOUTH EVERY DAY 90 Tablet 3    sumatriptan (IMITREX) 100 MG tablet TAKE 1 TABLET BY MOUTH 1 TIME AS NEEDED FOR MIGRAINE. MAY TAKE SECOND TABLET 2 HOURS AFTER IF STILL AT ONSET OF HEADACHE 9 Tablet 11    finasteride (PROSCAR) 5 MG Tab TAKE 1 TABLET BY MOUTH EVERY DAY 90 Tablet 3    indomethacin (INDOCIN) 25 MG Cap TAKE 1 CAPSULE BY MOUTH THREE TIMES DAILY FOR 3 DAYS THEN 2 CAPSULES THREE TIMES DAILY FOR 3 DAYS THEN 3 CAPSULES THREE TIMES DAILY FOR 3 DAYS 54 Capsule 0    Hypromell-Glycerin-Naphazoline (CLEAR EYES FOR DRY EYES PLUS) 0.8-0.25-0.012 % Solution Administer 1 Drop into affected eye(s) every four hours as needed (Bilateral). 30 mL 11    DYMISTA 137-50 MCG/ACT Suspension Administer 2 Sprays into affected nostril(S) every day. 23 g 5    albuterol 108 (90 Base) MCG/ACT Aero Soln inhalation aerosol Inhale 2 Puffs every four hours as needed. 18 g 11    naproxen (NAPROSYN) 500 MG Tab TAKE 1 TABLET BY MOUTH TWICE DAILY WITH MEALS 60 Tablet 0     No current facility-administered medications for this visit.   [2]   Past Medical History:  Diagnosis Date    Back pain     Central sleep apnea     Depression     FLIP (obstructive sleep apnea)    [3]   Past Surgical History:  Procedure Laterality Date    HERNIA REPAIR

## 2025-07-27 DIAGNOSIS — K21.9 GASTROESOPHAGEAL REFLUX DISEASE: ICD-10-CM

## 2025-07-28 RX ORDER — OMEPRAZOLE 20 MG/1
CAPSULE, DELAYED RELEASE ORAL
Qty: 180 CAPSULE | Refills: 2 | Status: SHIPPED | OUTPATIENT
Start: 2025-07-28

## 2025-08-21 ENCOUNTER — TELEMEDICINE (OUTPATIENT)
Dept: BEHAVIORAL HEALTH | Facility: CLINIC | Age: 50
End: 2025-08-21
Payer: OTHER GOVERNMENT

## 2025-08-21 DIAGNOSIS — F41.1 GAD (GENERALIZED ANXIETY DISORDER): ICD-10-CM

## 2025-08-21 DIAGNOSIS — F43.12 CHRONIC POST-TRAUMATIC STRESS DISORDER (PTSD): ICD-10-CM

## 2025-08-21 DIAGNOSIS — F33.42 RECURRENT MAJOR DEPRESSIVE DISORDER, IN FULL REMISSION (HCC): Primary | ICD-10-CM

## 2025-08-21 PROCEDURE — 99214 OFFICE O/P EST MOD 30 MIN: CPT | Mod: 95 | Performed by: PSYCHIATRY & NEUROLOGY

## 2025-08-21 ASSESSMENT — ANXIETY QUESTIONNAIRES
IF YOU CHECKED OFF ANY PROBLEMS ON THIS QUESTIONNAIRE, HOW DIFFICULT HAVE THESE PROBLEMS MADE IT FOR YOU TO DO YOUR WORK, TAKE CARE OF THINGS AT HOME, OR GET ALONG WITH OTHER PEOPLE: SOMEWHAT DIFFICULT
6. BECOMING EASILY ANNOYED OR IRRITABLE: SEVERAL DAYS
IF YOU CHECKED OFF ANY PROBLEMS ON THIS QUESTIONNAIRE, HOW DIFFICULT HAVE THESE PROBLEMS MADE IT FOR YOU TO DO YOUR WORK, TAKE CARE OF THINGS AT HOME, OR GET ALONG WITH OTHER PEOPLE: SOMEWHAT DIFFICULT
1. FEELING NERVOUS, ANXIOUS, OR ON EDGE: MORE THAN HALF THE DAYS
6. BECOMING EASILY ANNOYED OR IRRITABLE: SEVERAL DAYS
5. BEING SO RESTLESS THAT IT IS HARD TO SIT STILL: SEVERAL DAYS
4. TROUBLE RELAXING: SEVERAL DAYS
3. WORRYING TOO MUCH ABOUT DIFFERENT THINGS: SEVERAL DAYS
5. BEING SO RESTLESS THAT IT IS HARD TO SIT STILL: SEVERAL DAYS
2. NOT BEING ABLE TO STOP OR CONTROL WORRYING: SEVERAL DAYS
4. TROUBLE RELAXING: SEVERAL DAYS
7. FEELING AFRAID AS IF SOMETHING AWFUL MIGHT HAPPEN: SEVERAL DAYS
2. NOT BEING ABLE TO STOP OR CONTROL WORRYING: SEVERAL DAYS
7. FEELING AFRAID AS IF SOMETHING AWFUL MIGHT HAPPEN: SEVERAL DAYS
1. FEELING NERVOUS, ANXIOUS, OR ON EDGE: MORE THAN HALF THE DAYS
3. WORRYING TOO MUCH ABOUT DIFFERENT THINGS: SEVERAL DAYS
GAD7 TOTAL SCORE: 8

## 2025-08-21 ASSESSMENT — PATIENT HEALTH QUESTIONNAIRE - PHQ9
6. FEELING BAD ABOUT YOURSELF - OR THAT YOU ARE A FAILURE OR HAVE LET YOURSELF OR YOUR FAMILY DOWN: SEVERAL DAYS
9. THOUGHTS THAT YOU WOULD BE BETTER OFF DEAD, OR OF HURTING YOURSELF: SEVERAL DAYS
6. FEELING BAD ABOUT YOURSELF - OR THAT YOU ARE A FAILURE OR HAVE LET YOURSELF OR YOUR FAMILY DOWN: 1
3. TROUBLE FALLING OR STAYING ASLEEP OR SLEEPING TOO MUCH: 1
2. FEELING DOWN, DEPRESSED, IRRITABLE, OR HOPELESS: 1
CLINICAL INTERPRETATION OF PHQ2 SCORE: 0
9. THOUGHTS THAT YOU WOULD BE BETTER OFF DEAD, OR OF HURTING YOURSELF: 1
10. IF YOU CHECKED OFF ANY PROBLEMS, HOW DIFFICULT HAVE THESE PROBLEMS MADE IT FOR YOU TO DO YOUR WORK, TAKE CARE OF THINGS AT HOME, OR GET ALONG WITH OTHER PEOPLE: SOMEWHAT DIFFICULT
8. MOVING OR SPEAKING SO SLOWLY THAT OTHER PEOPLE COULD HAVE NOTICED. OR THE OPPOSITE, BEING SO FIGETY OR RESTLESS THAT YOU HAVE BEEN MOVING AROUND A LOT MORE THAN USUAL: 1
5. POOR APPETITE OR OVEREATING: 0
8. MOVING OR SPEAKING SO SLOWLY THAT OTHER PEOPLE COULD HAVE NOTICED. OR THE OPPOSITE, BEING SO FIGETY OR RESTLESS THAT YOU HAVE BEEN MOVING AROUND A LOT MORE THAN USUAL: SEVERAL DAYS
3. TROUBLE FALLING OR STAYING ASLEEP OR SLEEPING TOO MUCH: SEVERAL DAYS
1. LITTLE INTEREST OR PLEASURE IN DOING THINGS: 1
7. TROUBLE CONCENTRATING ON THINGS, SUCH AS READING THE NEWSPAPER OR WATCHING TELEVISION: 2
1. LITTLE INTEREST OR PLEASURE IN DOING THINGS: SEVERAL DAYS
2. FEELING DOWN, DEPRESSED, IRRITABLE, OR HOPELESS: SEVERAL DAYS
5. POOR APPETITE OR OVEREATING: 0 - NOT AT ALL
SUM OF ALL RESPONSES TO PHQ QUESTIONS 1-9: 11
5. POOR APPETITE OR OVEREATING: NOT AT ALL
4. FEELING TIRED OR HAVING LITTLE ENERGY: 3
7. TROUBLE CONCENTRATING ON THINGS, SUCH AS READING THE NEWSPAPER OR WATCHING TELEVISION: MORE THAN HALF THE DAYS
4. FEELING TIRED OR HAVING LITTLE ENERGY: NEARLY EVERY DAY